# Patient Record
Sex: FEMALE | Race: WHITE | NOT HISPANIC OR LATINO | URBAN - METROPOLITAN AREA
[De-identification: names, ages, dates, MRNs, and addresses within clinical notes are randomized per-mention and may not be internally consistent; named-entity substitution may affect disease eponyms.]

---

## 2017-01-03 ENCOUNTER — INPATIENT (INPATIENT)
Facility: HOSPITAL | Age: 67
LOS: 6 days | Discharge: NURSING, REHAB OR LONG-TERM CARE | DRG: 60 | End: 2017-01-10
Attending: HOSPITALIST | Admitting: HOSPITALIST
Payer: COMMERCIAL

## 2017-01-03 VITALS
SYSTOLIC BLOOD PRESSURE: 171 MMHG | WEIGHT: 130.07 LBS | HEART RATE: 112 BPM | TEMPERATURE: 98 F | OXYGEN SATURATION: 96 % | DIASTOLIC BLOOD PRESSURE: 102 MMHG | RESPIRATION RATE: 16 BRPM

## 2017-01-03 DIAGNOSIS — Z29.9 ENCOUNTER FOR PROPHYLACTIC MEASURES, UNSPECIFIED: ICD-10-CM

## 2017-01-03 DIAGNOSIS — E78.5 HYPERLIPIDEMIA, UNSPECIFIED: ICD-10-CM

## 2017-01-03 DIAGNOSIS — Z90.710 ACQUIRED ABSENCE OF BOTH CERVIX AND UTERUS: Chronic | ICD-10-CM

## 2017-01-03 DIAGNOSIS — I63.9 CEREBRAL INFARCTION, UNSPECIFIED: ICD-10-CM

## 2017-01-03 DIAGNOSIS — I10 ESSENTIAL (PRIMARY) HYPERTENSION: ICD-10-CM

## 2017-01-03 LAB
ALBUMIN SERPL ELPH-MCNC: 4.1 G/DL — SIGNIFICANT CHANGE UP (ref 3.3–5)
ALP SERPL-CCNC: 82 U/L — SIGNIFICANT CHANGE UP (ref 40–120)
ALT FLD-CCNC: 14 U/L — SIGNIFICANT CHANGE UP (ref 12–78)
ANION GAP SERPL CALC-SCNC: 12 MMOL/L — SIGNIFICANT CHANGE UP (ref 5–17)
APTT BLD: 31.5 SEC — SIGNIFICANT CHANGE UP (ref 27.5–37.4)
AST SERPL-CCNC: 29 U/L — SIGNIFICANT CHANGE UP (ref 15–37)
BILIRUB SERPL-MCNC: 0.5 MG/DL — SIGNIFICANT CHANGE UP (ref 0.2–1.2)
BUN SERPL-MCNC: 15 MG/DL — SIGNIFICANT CHANGE UP (ref 7–23)
CALCIUM SERPL-MCNC: 8.8 MG/DL — SIGNIFICANT CHANGE UP (ref 8.5–10.1)
CHLORIDE SERPL-SCNC: 108 MMOL/L — SIGNIFICANT CHANGE UP (ref 96–108)
CK MB BLD-MCNC: 0.7 % — SIGNIFICANT CHANGE UP (ref 0–3.5)
CK MB CFR SERPL CALC: 0.7 NG/ML — SIGNIFICANT CHANGE UP (ref 0–3.6)
CK SERPL-CCNC: 107 U/L — SIGNIFICANT CHANGE UP (ref 26–192)
CO2 SERPL-SCNC: 20 MMOL/L — LOW (ref 22–31)
CREAT SERPL-MCNC: 1.2 MG/DL — SIGNIFICANT CHANGE UP (ref 0.5–1.3)
GLUCOSE SERPL-MCNC: 111 MG/DL — HIGH (ref 70–99)
HCT VFR BLD CALC: 41.7 % — SIGNIFICANT CHANGE UP (ref 34.5–45)
HGB BLD-MCNC: 14.1 G/DL — SIGNIFICANT CHANGE UP (ref 11.5–15.5)
INR BLD: 1.03 RATIO — SIGNIFICANT CHANGE UP (ref 0.88–1.16)
MCHC RBC-ENTMCNC: 32.4 PG — SIGNIFICANT CHANGE UP (ref 27–34)
MCHC RBC-ENTMCNC: 33.8 GM/DL — SIGNIFICANT CHANGE UP (ref 32–36)
MCV RBC AUTO: 95.7 FL — SIGNIFICANT CHANGE UP (ref 80–100)
PLATELET # BLD AUTO: 357 K/UL — SIGNIFICANT CHANGE UP (ref 150–400)
POTASSIUM SERPL-MCNC: 5.4 MMOL/L — HIGH (ref 3.5–5.3)
POTASSIUM SERPL-SCNC: 5.4 MMOL/L — HIGH (ref 3.5–5.3)
PROT SERPL-MCNC: 8.6 G/DL — HIGH (ref 6–8.3)
PROTHROM AB SERPL-ACNC: 11.4 SEC — SIGNIFICANT CHANGE UP (ref 10–13.1)
RBC # BLD: 4.35 M/UL — SIGNIFICANT CHANGE UP (ref 3.8–5.2)
RBC # FLD: 11.2 % — SIGNIFICANT CHANGE UP (ref 10.3–14.5)
SODIUM SERPL-SCNC: 140 MMOL/L — SIGNIFICANT CHANGE UP (ref 135–145)
TROPONIN I SERPL-MCNC: <.015 NG/ML — SIGNIFICANT CHANGE UP (ref 0.01–0.04)
WBC # BLD: 8.5 K/UL — SIGNIFICANT CHANGE UP (ref 3.8–10.5)
WBC # FLD AUTO: 8.5 K/UL — SIGNIFICANT CHANGE UP (ref 3.8–10.5)

## 2017-01-03 PROCEDURE — 99285 EMERGENCY DEPT VISIT HI MDM: CPT

## 2017-01-03 PROCEDURE — 70450 CT HEAD/BRAIN W/O DYE: CPT | Mod: 26

## 2017-01-03 PROCEDURE — 93010 ELECTROCARDIOGRAM REPORT: CPT

## 2017-01-03 PROCEDURE — 99223 1ST HOSP IP/OBS HIGH 75: CPT | Mod: GC

## 2017-01-03 PROCEDURE — 71010: CPT | Mod: 26

## 2017-01-03 RX ORDER — ASPIRIN/CALCIUM CARB/MAGNESIUM 324 MG
81 TABLET ORAL DAILY
Qty: 0 | Refills: 0 | Status: DISCONTINUED | OUTPATIENT
Start: 2017-01-03 | End: 2017-01-10

## 2017-01-03 RX ORDER — RIVAROXABAN 15 MG-20MG
20 KIT ORAL DAILY
Qty: 0 | Refills: 0 | Status: DISCONTINUED | OUTPATIENT
Start: 2017-01-03 | End: 2017-01-03

## 2017-01-03 RX ORDER — ATORVASTATIN CALCIUM 80 MG/1
40 TABLET, FILM COATED ORAL AT BEDTIME
Qty: 0 | Refills: 0 | Status: DISCONTINUED | OUTPATIENT
Start: 2017-01-03 | End: 2017-01-10

## 2017-01-03 RX ORDER — PANTOPRAZOLE SODIUM 20 MG/1
40 TABLET, DELAYED RELEASE ORAL
Qty: 0 | Refills: 0 | Status: DISCONTINUED | OUTPATIENT
Start: 2017-01-03 | End: 2017-01-10

## 2017-01-03 RX ORDER — RIVAROXABAN 15 MG-20MG
15 KIT ORAL DAILY
Qty: 0 | Refills: 0 | Status: DISCONTINUED | OUTPATIENT
Start: 2017-01-03 | End: 2017-01-04

## 2017-01-03 RX ADMIN — RIVAROXABAN 15 MILLIGRAM(S): KIT at 23:48

## 2017-01-03 NOTE — H&P ADULT. - ASSESSMENT
67 yo f pmhx htn, hld, strokes (Jan 2014, March 2014), ?a. fib, on xarelto (admits to not taking it for approx 1 month), presents with slurred speech and balance problems, much improved as the evening progressed. Admitted to tele for r/o TIA.

## 2017-01-03 NOTE — ED PROVIDER NOTE - MEDICAL DECISION MAKING DETAILS
pt with sx of speech change, balance and coordination off for greater than 5 hours, on xarelto.  ct head no acute, admit tele. neuro will see pt in am

## 2017-01-03 NOTE — ED PROVIDER NOTE - CHPI ED SYMPTOMS NEG
no focal deficit/no blurred vision/no aura/no fever/no change in level of consciousness/no facial droop/no agitation

## 2017-01-03 NOTE — ED PROVIDER NOTE - CONSTITUTIONAL, MLM
normal... elderrly, well nourished, awake, alert, oriented to person, place, time/situation and in no apparent distress.

## 2017-01-03 NOTE — H&P ADULT. - PROBLEM SELECTOR PLAN 2
- questionable hx  - follow up lipid panel   - lipitor 40 started secondary to stroke hx - questionable hx  - follow up lipid panel   - lipitor 40 started secondary to stroke hx will adjust to goal LDL

## 2017-01-03 NOTE — ED ADULT NURSE NOTE - OBJECTIVE STATEMENT
Pt. received alert and oriented x4 with chief complaint of dizziness/lightheadedness/and leaning to right side since early this afternoon. Pt. placed on continuous cardiac monitor with continuous pulse ox. EKG performed at bedside upon arrival.

## 2017-01-03 NOTE — H&P ADULT. - HISTORY OF PRESENT ILLNESS
65 yo f pmhx htn, hld, strokes (Jan 2014, March 2014), ?a. fib, on xarelto (admits to not taking it for approx 1 month), presents with slurred speech and balance problems. Pt. son bedside states when he got home from work his mother was hunched over, had difficulty walking, rt. arm had uncontrollable tremors, and had difficulty eating her dinner. Pt. states she was complaining of dizziness, went to pour a drink and completely missed her cup. Currently pt. states she feels better, denies cp, sob, dizziness, headaches, and blurred vision, and all else on ROS. Pt. stated she did not take her xarelto because it upset her stomach. Pt. states she had been on cholesterol meds in the past but the doctor took her off of them. Pt. remarks no strokes since  2014, however son states she may have suffered from some mini strokes since. Son states in 2014 when pt. was hospitalized for strokes, pt. had complete inability of rt. UE.     In the ED, labs stable. HR max 112, currently 78. BP max 171/102, currently 109/62. EKG NSR, left atrial enlargement on prelim read, 89 bpm. CT head showed Fairly advanced atrophy and chronic microvascular changes. No acute or focal brain pathology. Stable exam. Per ED neuro (Leeanne) consulted, will see pt. in AM. Son and pt. state much improvement as the evening has progressed.

## 2017-01-03 NOTE — H&P ADULT. - PROBLEM SELECTOR PLAN 1
Pt. presented with c/o slurred speech and balance problems this evening, resolved in ED.   - CT head was stable per read  - neuro (Leeanne) consulted, will see pt. in the AM  - Leeanne called overnight to discuss anticoagulation, recommended 20 mg xarelto  - admit to TELE to monitor vitals and neuro status   - f/u labs, echo, carotid  - poor historian, f/u Cardio consult (Kareem)  - baby aspirin cont.

## 2017-01-03 NOTE — H&P ADULT. - NEUROLOGICAL DETAILS
responds to verbal commands/sensation intact/alert and oriented x 3/responds to pain/cranial nerves intact

## 2017-01-03 NOTE — ED PROVIDER NOTE - OBJECTIVE STATEMENT
Pt is a 67 yo female hx cva, htn. pt last well this am.  family member states that she wasn't speaking right and was very clumsy this am. pt this afternoon noted by other family to have same sx. pt on xarelto. pt denies co.  sx improving and speech fluent now

## 2017-01-04 ENCOUNTER — TRANSCRIPTION ENCOUNTER (OUTPATIENT)
Age: 67
End: 2017-01-04

## 2017-01-04 LAB
ALBUMIN SERPL ELPH-MCNC: 4.1 G/DL — SIGNIFICANT CHANGE UP (ref 3.3–5)
ALP SERPL-CCNC: 76 U/L — SIGNIFICANT CHANGE UP (ref 40–120)
ALT FLD-CCNC: 12 U/L — SIGNIFICANT CHANGE UP (ref 12–78)
ANION GAP SERPL CALC-SCNC: 9 MMOL/L — SIGNIFICANT CHANGE UP (ref 5–17)
AST SERPL-CCNC: 10 U/L — LOW (ref 15–37)
BASOPHILS # BLD AUTO: 0.1 K/UL — SIGNIFICANT CHANGE UP (ref 0–0.2)
BASOPHILS NFR BLD AUTO: 0.9 % — SIGNIFICANT CHANGE UP (ref 0–2)
BILIRUB SERPL-MCNC: 0.5 MG/DL — SIGNIFICANT CHANGE UP (ref 0.2–1.2)
BUN SERPL-MCNC: 16 MG/DL — SIGNIFICANT CHANGE UP (ref 7–23)
CALCIUM SERPL-MCNC: 9 MG/DL — SIGNIFICANT CHANGE UP (ref 8.5–10.1)
CHLORIDE SERPL-SCNC: 107 MMOL/L — SIGNIFICANT CHANGE UP (ref 96–108)
CHOLEST SERPL-MCNC: 259 MG/DL — HIGH (ref 10–199)
CO2 SERPL-SCNC: 27 MMOL/L — SIGNIFICANT CHANGE UP (ref 22–31)
CREAT SERPL-MCNC: 0.85 MG/DL — SIGNIFICANT CHANGE UP (ref 0.5–1.3)
EOSINOPHIL # BLD AUTO: 0.1 K/UL — SIGNIFICANT CHANGE UP (ref 0–0.5)
EOSINOPHIL NFR BLD AUTO: 0.9 % — SIGNIFICANT CHANGE UP (ref 0–6)
GLUCOSE SERPL-MCNC: 94 MG/DL — SIGNIFICANT CHANGE UP (ref 70–99)
HCT VFR BLD CALC: 40.8 % — SIGNIFICANT CHANGE UP (ref 34.5–45)
HDLC SERPL-MCNC: 47 MG/DL — SIGNIFICANT CHANGE UP (ref 40–125)
HGB BLD-MCNC: 13.6 G/DL — SIGNIFICANT CHANGE UP (ref 11.5–15.5)
LIPID PNL WITH DIRECT LDL SERPL: 170 MG/DL — HIGH
LYMPHOCYTES # BLD AUTO: 2.9 K/UL — SIGNIFICANT CHANGE UP (ref 1–3.3)
LYMPHOCYTES # BLD AUTO: 31.4 % — SIGNIFICANT CHANGE UP (ref 13–44)
MCHC RBC-ENTMCNC: 32.2 PG — SIGNIFICANT CHANGE UP (ref 27–34)
MCHC RBC-ENTMCNC: 33.2 GM/DL — SIGNIFICANT CHANGE UP (ref 32–36)
MCV RBC AUTO: 96.8 FL — SIGNIFICANT CHANGE UP (ref 80–100)
MONOCYTES # BLD AUTO: 0.6 K/UL — SIGNIFICANT CHANGE UP (ref 0–0.9)
MONOCYTES NFR BLD AUTO: 6.6 % — SIGNIFICANT CHANGE UP (ref 1–9)
NEUTROPHILS # BLD AUTO: 5.5 K/UL — SIGNIFICANT CHANGE UP (ref 1.8–7.4)
NEUTROPHILS NFR BLD AUTO: 60.2 % — SIGNIFICANT CHANGE UP (ref 43–77)
PLATELET # BLD AUTO: 298 K/UL — SIGNIFICANT CHANGE UP (ref 150–400)
POTASSIUM SERPL-MCNC: 4.2 MMOL/L — SIGNIFICANT CHANGE UP (ref 3.5–5.3)
POTASSIUM SERPL-SCNC: 4.2 MMOL/L — SIGNIFICANT CHANGE UP (ref 3.5–5.3)
PROT SERPL-MCNC: 7.8 G/DL — SIGNIFICANT CHANGE UP (ref 6–8.3)
RBC # BLD: 4.22 M/UL — SIGNIFICANT CHANGE UP (ref 3.8–5.2)
RBC # FLD: 11.3 % — SIGNIFICANT CHANGE UP (ref 10.3–14.5)
SODIUM SERPL-SCNC: 143 MMOL/L — SIGNIFICANT CHANGE UP (ref 135–145)
TOTAL CHOLESTEROL/HDL RATIO MEASUREMENT: 5.5 RATIO — SIGNIFICANT CHANGE UP (ref 3.3–7.1)
TRIGL SERPL-MCNC: 209 MG/DL — HIGH (ref 10–149)
WBC # BLD: 9.1 K/UL — SIGNIFICANT CHANGE UP (ref 3.8–10.5)
WBC # FLD AUTO: 9.1 K/UL — SIGNIFICANT CHANGE UP (ref 3.8–10.5)

## 2017-01-04 PROCEDURE — 70551 MRI BRAIN STEM W/O DYE: CPT | Mod: 26,59

## 2017-01-04 PROCEDURE — 70544 MR ANGIOGRAPHY HEAD W/O DYE: CPT | Mod: 26

## 2017-01-04 PROCEDURE — 93880 EXTRACRANIAL BILAT STUDY: CPT | Mod: 26

## 2017-01-04 PROCEDURE — 99233 SBSQ HOSP IP/OBS HIGH 50: CPT

## 2017-01-04 PROCEDURE — 93306 TTE W/DOPPLER COMPLETE: CPT | Mod: 26

## 2017-01-04 PROCEDURE — 72156 MRI NECK SPINE W/O & W/DYE: CPT | Mod: 26

## 2017-01-04 RX ORDER — RIVAROXABAN 15 MG-20MG
20 KIT ORAL
Qty: 0 | Refills: 0 | Status: DISCONTINUED | OUTPATIENT
Start: 2017-01-04 | End: 2017-01-09

## 2017-01-04 RX ADMIN — Medication 81 MILLIGRAM(S): at 14:02

## 2017-01-04 RX ADMIN — PANTOPRAZOLE SODIUM 40 MILLIGRAM(S): 20 TABLET, DELAYED RELEASE ORAL at 05:21

## 2017-01-04 RX ADMIN — ATORVASTATIN CALCIUM 40 MILLIGRAM(S): 80 TABLET, FILM COATED ORAL at 22:04

## 2017-01-04 RX ADMIN — RIVAROXABAN 20 MILLIGRAM(S): KIT at 17:43

## 2017-01-04 NOTE — DISCHARGE NOTE ADULT - CARE PROVIDER_API CALL
Humphrey Fallon (PIPPA), Neurology  [address] [place] [address]  Phone: [phone]  Fax: [phone]    Irvin Matthews (), Family Medicine  [address] [facility] [address]nd floor  [place], [place] [id number]  Phone: [phone]  Fax: [phone]

## 2017-01-04 NOTE — DISCHARGE NOTE ADULT - MEDICATION SUMMARY - MEDICATIONS TO TAKE
I will START or STAY ON the medications listed below when I get home from the hospital:    Aspir 81 oral delayed release tablet  -- 1 tab(s) by mouth once a day  -- Indication: For Stroke    atorvastatin 40 mg oral tablet  -- 1 tab(s) by mouth once a day (at bedtime)  -- Indication: For Stroke

## 2017-01-04 NOTE — DISCHARGE NOTE ADULT - CARE PLAN
Principal Discharge DX:	TIA (transient ischemic attack)  Goal:	resolved  Instructions for follow-up, activity and diet:	Continue ASA and statin.  MRI and CT head negative for acute CVA.  Follow up with PCP, Dr. Foss, within 3 days.  Follow up with Neurology, Dr. Fallon, within 3 days.  Secondary Diagnosis:	HLD (hyperlipidemia)  Instructions for follow-up, activity and diet:	Chronic. Continue statin.  Secondary Diagnosis:	HTN (hypertension)  Instructions for follow-up, activity and diet:	Chronic. Controlled without medications.  Secondary Diagnosis:	History of stroke  Instructions for follow-up, activity and diet:	Continue with statin.  Secondary Diagnosis:	MS (multiple sclerosis)  Goal:	new diagnosis  Instructions for follow-up, activity and diet:	Follow up with Neurology within 3 days.  Secondary Diagnosis:	Atrial fibrillation  Instructions for follow-up, activity and diet:	Chronic. Has history of Atrial fibrillation but on tele monitor pt has been sinus rhythm.   Continue Xarelto. Principal Discharge DX:	TIA (transient ischemic attack)  Goal:	ruled out.  Instructions for follow-up, activity and diet:	Continue ASA and statin.  MRI and CT head negative for acute CVA.  Follow up with PCP, Dr. Foss, within 3 days.  Follow up with Neurology, Dr. Fallon, within 3 days.  Secondary Diagnosis:	HLD (hyperlipidemia)  Instructions for follow-up, activity and diet:	Chronic. Continue statin.  Secondary Diagnosis:	HTN (hypertension)  Instructions for follow-up, activity and diet:	Chronic. Controlled without medications.  Secondary Diagnosis:	History of stroke  Instructions for follow-up, activity and diet:	Continue with statin.  Secondary Diagnosis:	MS (multiple sclerosis)  Goal:	new diagnosis  Instructions for follow-up, activity and diet:	Pt s/p solumedrol x3 days.  Follow up with Neurology within 3 days.  Secondary Diagnosis:	Atrial fibrillation  Instructions for follow-up, activity and diet:	Chronic. Has history of Atrial fibrillation but on tele monitor pt has been sinus rhythm.   As per cardiology, discontinue Xarelto. Principal Discharge DX:	TIA (transient ischemic attack)  Goal:	ruled out.  Instructions for follow-up, activity and diet:	Continue ASA and statin.  MRI and CT head negative for acute CVA.  Follow up with PCP, Dr. Foss, within 3 days.  Follow up with Neurology, Dr. Fallon, within 3 days.  Secondary Diagnosis:	HLD (hyperlipidemia)  Instructions for follow-up, activity and diet:	Chronic. Continue statin.  Secondary Diagnosis:	HTN (hypertension)  Instructions for follow-up, activity and diet:	Chronic. Controlled without medications.  Secondary Diagnosis:	History of stroke  Instructions for follow-up, activity and diet:	Continue with statin.  Secondary Diagnosis:	MS (multiple sclerosis)  Goal:	new diagnosis  Instructions for follow-up, activity and diet:	Pt s/p solumedrol x3 days.  Follow up with Neurology within 3 days and follow up with Borrelia burgdorferi titers.  Secondary Diagnosis:	Atrial fibrillation  Instructions for follow-up, activity and diet:	Chronic. Has history of Atrial fibrillation but on tele monitor pt has been sinus rhythm.   As per cardiology, discontinue Xarelto.

## 2017-01-04 NOTE — DISCHARGE NOTE ADULT - PATIENT PORTAL LINK FT
“You can access the [employer] Patient Portal, offered by [facility], by registering with the following website: [email or web address]”

## 2017-01-04 NOTE — DISCHARGE NOTE ADULT - NS AS DC STROKE ED MATERIALS
Need for Followup After Discharge/High Blood pressure is a risk factor for Strokes/Risk Factors for Stroke/Stroke Warning Signs and Symptoms/Call 911 for Stroke/Prescribed Medications/Stroke Education Booklet Stroke Warning Signs and Symptoms/Risk Factors for Stroke/Need for Followup After Discharge/Call 911 for Stroke/Stroke Education Booklet/Prescribed Medications Stroke Education Booklet/Need for Followup After Discharge/Call 911 for Stroke/Risk Factors for Stroke/Prescribed Medications/Stroke Warning Signs and Symptoms

## 2017-01-04 NOTE — DISCHARGE NOTE ADULT - PLAN OF CARE
Continue ASA and statin.  MRI and CT head negative for acute CVA.  Follow up with PCP, Dr. Foss, within 3 days.  Follow up with Neurology, Dr. Fallon, within 3 days. Chronic. Continue statin. Chronic. Controlled without medications. resolved Continue with statin. Follow up with Neurology within 3 days. new diagnosis Chronic. Has history of Atrial fibrillation but on tele monitor pt has been sinus rhythm.   Continue Xarelto. ruled out. Pt s/p solumedrol x3 days.  Follow up with Neurology within 3 days. Chronic. Has history of Atrial fibrillation but on tele monitor pt has been sinus rhythm.   As per cardiology, discontinue Xarelto. Pt s/p solumedrol x3 days.  Follow up with Neurology within 3 days and follow up with Borrelia burgdorferi titers.

## 2017-01-04 NOTE — DISCHARGE NOTE ADULT - HOSPITAL COURSE
67 yo f pmhx htn, hld, strokes (Jan 2014, March 2014),?a. fib, on xarelto (admits to not taking it for approx 1 month), presents with slurred speech and balance problems. Pt. son bedside states when he got home from work his mother was hunched over, had difficulty walking, rt. arm had uncontrollable tremors, and had difficulty eating her dinner. Pt. states she was complaining of dizziness, went to pour a drink and completely missed her cup. Currently pt. states she feels better, denies cp, sob, dizziness, headaches, and blurred vision, and all else on ROS. Pt. stated she did not take her xarelto because it upset her stomach. Pt. states she had been on cholesterol meds in the past but the doctor took her off of them. Pt. remarks no strokes since  2014, however son states she may have suffered from some mini strokes since. Son states in 2014 when pt. was hospitalized for strokes, pt. had complete inability of rt. UE.     In the ED, labs stable. HR max 112, currently 78. BP max 171/102, currently 109/62. EKG NSR, left atrial enlargement on prelim read, 89 bpm. CT head showed Fairly advanced atrophy and chronic microvascular changes. No acute or focal brain pathology. Stable exam. Per ED neuro (Leeanne) consulted, will see pt. in AM. Son and pt. state much improvement as the evening has progressed    CXR- no active disease, stable exam.  MRI head/brain/cspine/tspine done and reviewed on sunrise.  US carotid B/L- no flow limiting stenosis.  Pt started in ASA and statin.  Lipid panel revealed Chol 259, , HDL 47, .  Pt was in sinus rhythm overnight and no A.fib observed on tele.  Pt continued on xarelto. As per neurology, Dr. Fallon, pt most likely has multiple sclerosis and will follow up with neurology as outpatient.    Pt seen and examined at bedside.  PE: Vitals  Gen:  Cardio:  Resp:  Abd:   Ext:    Pt medically optimized for 65 yo f pmhx htn, hld, strokes (Jan 2014, March 2014),?a. fib, on xarelto (admits to not taking it for approx 1 month), presents with slurred speech and balance problems. Pt. son bedside states when he got home from work his mother was hunched over, had difficulty walking, rt. arm had uncontrollable tremors, and had difficulty eating her dinner. Pt. states she was complaining of dizziness, went to pour a drink and completely missed her cup. Currently pt. states she feels better, denies cp, sob, dizziness, headaches, and blurred vision, and all else on ROS. Pt. stated she did not take her xarelto because it upset her stomach. Pt. states she had been on cholesterol meds in the past but the doctor took her off of them. Pt. remarks no strokes since  2014, however son states she may have suffered from some mini strokes since. Son states in 2014 when pt. was hospitalized for strokes, pt. had complete inability of rt. UE.     In the ED, labs stable. HR max 112, currently 78. BP max 171/102, currently 109/62. EKG NSR, left atrial enlargement on prelim read, 89 bpm. CT head showed Fairly advanced atrophy and chronic microvascular changes. No acute or focal brain pathology. Stable exam. Per ED neuro (Leeanne) consulted, will see pt. in AM. Son and pt. state much improvement as the evening has progressed    CXR- no active disease, stable exam.  MRI head/brain/cspine/tspine done and reviewed on sunrise.  US carotid B/L- no flow limiting stenosis.  Pt started in ASA and statin.  Lipid panel revealed Chol 259, , HDL 47, .  Pt was in sinus rhythm overnight and no A.fib observed on tele.  Pt continued on xarelto while at [facility].  As per neurology, Dr. Fallon, pt most likely has multiple sclerosis.  Pt given solumedrol x 3 days.  Will follow up with neurology as outpatient.    Pt seen and examined at bedside.  Pt states residual R side weakness from previous stoke.  Denies any complaints today. Denies any CP, SOB, abdominal pain, HA.  Monitored on tele with no acute events over night and has been in sinus rhythm.  PE: Vitals: 97.9F, HR 78, 148/83, RR17, 97% O2 on RA  Gen: NAD, AAOx3  Cardio: +S1/S2  Resp: CTA B/L  Abd: +BS, NDNT, soft  Ext: no edema, 2+ DP pulses  MSK: 5/5 LUE and LLE, 4+/5 RUE and RLE, very mild tremors    Pt is medically stable to be discharge to acute rehab.    Pt medically optimized for 65 yo f pmhx htn, hld, strokes (Jan 2014, March 2014),?a. fib, on xarelto (admits to not taking it for approx 1 month), presents with slurred speech and balance problems. Pt. son bedside states when he got home from work his mother was hunched over, had difficulty walking, rt. arm had uncontrollable tremors, and had difficulty eating her dinner. Pt. states she was complaining of dizziness, went to pour a drink and completely missed her cup. Currently pt. states she feels better, denies cp, sob, dizziness, headaches, and blurred vision, and all else on ROS. Pt. stated she did not take her xarelto because it upset her stomach. Pt. states she had been on cholesterol meds in the past but the doctor took her off of them. Pt. remarks no strokes since  2014, however son states she may have suffered from some mini strokes since. Son states in 2014 when pt. was hospitalized for strokes, pt. had complete inability of rt. UE.     In the ED, labs stable. HR max 112, currently 78. BP max 171/102, currently 109/62. EKG NSR, left atrial enlargement on prelim read, 89 bpm. CT head showed Fairly advanced atrophy and chronic microvascular changes. No acute or focal brain pathology. Stable exam. Per ED neuro (Leeanne) consulted, will see pt. in AM. Son and pt. state much improvement as the evening has progressed    CXR- no active disease, stable exam.  MRI head/brain/cspine/tspine done and reviewed on sunrise.  US carotid B/L- no flow limiting stenosis.  Pt started in ASA and statin.  Lipid panel revealed Chol 259, , HDL 47, .  Pt was in sinus rhythm overnight and no A.fib observed on tele.  Pt continued on xarelto while at [place].  As per neurology, Dr. Fallon, pt most likely has multiple sclerosis.  Pt given solumedrol x 3 days.  Will follow up with neurology as outpatient.    Pt seen and examined at bedside.  Denies any complaints today. Denies any CP, SOB, abdominal pain.   PE: Vitals: 98F, HR 59, 145/81, RR 16, 97% O2 on RA  Gen: NAD, AAOx3  Cardio: +S1/S2  Resp: CTA B/L  Abd: +BS, NDNT, soft  Ext: no edema, 2+ DP pulses  MSK: 5/5 LUE and LLE, 4+/5 RUE and RLE, mild tremors    Pt is medically stable to be discharge to acute rehab.

## 2017-01-04 NOTE — DISCHARGE NOTE ADULT - SECONDARY DIAGNOSIS.
HLD (hyperlipidemia) HTN (hypertension) History of stroke MS (multiple sclerosis) Atrial fibrillation

## 2017-01-04 NOTE — DISCHARGE NOTE ADULT - ADDITIONAL INSTRUCTIONS
Follow up with PCP, Dr. Foss, within 3 days.  Follow up with Neurology, Dr. Fallon, within 3 days. Follow up with PCP, Dr. Foss, within 3 days.  Follow up with Neurology, Dr. Fallon, within 3 days.  As per cardiology, discontinue Xarelto.

## 2017-01-05 LAB
ANION GAP SERPL CALC-SCNC: 9 MMOL/L — SIGNIFICANT CHANGE UP (ref 5–17)
BUN SERPL-MCNC: 17 MG/DL — SIGNIFICANT CHANGE UP (ref 7–23)
CALCIUM SERPL-MCNC: 9.6 MG/DL — SIGNIFICANT CHANGE UP (ref 8.5–10.1)
CHLORIDE SERPL-SCNC: 106 MMOL/L — SIGNIFICANT CHANGE UP (ref 96–108)
CO2 SERPL-SCNC: 27 MMOL/L — SIGNIFICANT CHANGE UP (ref 22–31)
CREAT SERPL-MCNC: 0.99 MG/DL — SIGNIFICANT CHANGE UP (ref 0.5–1.3)
GLUCOSE SERPL-MCNC: 93 MG/DL — SIGNIFICANT CHANGE UP (ref 70–99)
HCT VFR BLD CALC: 43 % — SIGNIFICANT CHANGE UP (ref 34.5–45)
HGB BLD-MCNC: 14.7 G/DL — SIGNIFICANT CHANGE UP (ref 11.5–15.5)
MCHC RBC-ENTMCNC: 32.2 PG — SIGNIFICANT CHANGE UP (ref 27–34)
MCHC RBC-ENTMCNC: 34.2 GM/DL — SIGNIFICANT CHANGE UP (ref 32–36)
MCV RBC AUTO: 94.2 FL — SIGNIFICANT CHANGE UP (ref 80–100)
PLATELET # BLD AUTO: 326 K/UL — SIGNIFICANT CHANGE UP (ref 150–400)
POTASSIUM SERPL-MCNC: 3.9 MMOL/L — SIGNIFICANT CHANGE UP (ref 3.5–5.3)
POTASSIUM SERPL-SCNC: 3.9 MMOL/L — SIGNIFICANT CHANGE UP (ref 3.5–5.3)
RBC # BLD: 4.57 M/UL — SIGNIFICANT CHANGE UP (ref 3.8–5.2)
RBC # FLD: 11 % — SIGNIFICANT CHANGE UP (ref 10.3–14.5)
SODIUM SERPL-SCNC: 142 MMOL/L — SIGNIFICANT CHANGE UP (ref 135–145)
WBC # BLD: 7.8 K/UL — SIGNIFICANT CHANGE UP (ref 3.8–10.5)
WBC # FLD AUTO: 7.8 K/UL — SIGNIFICANT CHANGE UP (ref 3.8–10.5)

## 2017-01-05 PROCEDURE — 72157 MRI CHEST SPINE W/O & W/DYE: CPT | Mod: 26

## 2017-01-05 PROCEDURE — 99232 SBSQ HOSP IP/OBS MODERATE 35: CPT

## 2017-01-05 RX ORDER — RIVAROXABAN 15 MG-20MG
1 KIT ORAL
Qty: 0 | Refills: 0 | COMMUNITY
Start: 2017-01-05

## 2017-01-05 RX ADMIN — PANTOPRAZOLE SODIUM 40 MILLIGRAM(S): 20 TABLET, DELAYED RELEASE ORAL at 06:33

## 2017-01-05 RX ADMIN — Medication 81 MILLIGRAM(S): at 12:47

## 2017-01-05 RX ADMIN — ATORVASTATIN CALCIUM 40 MILLIGRAM(S): 80 TABLET, FILM COATED ORAL at 21:19

## 2017-01-05 RX ADMIN — Medication 100 MILLIGRAM(S): at 21:19

## 2017-01-05 RX ADMIN — RIVAROXABAN 20 MILLIGRAM(S): KIT at 17:25

## 2017-01-06 LAB
ANION GAP SERPL CALC-SCNC: 13 MMOL/L — SIGNIFICANT CHANGE UP (ref 5–17)
BUN SERPL-MCNC: 23 MG/DL — SIGNIFICANT CHANGE UP (ref 7–23)
CALCIUM SERPL-MCNC: 9.4 MG/DL — SIGNIFICANT CHANGE UP (ref 8.5–10.1)
CHLORIDE SERPL-SCNC: 107 MMOL/L — SIGNIFICANT CHANGE UP (ref 96–108)
CO2 SERPL-SCNC: 22 MMOL/L — SIGNIFICANT CHANGE UP (ref 22–31)
CREAT SERPL-MCNC: 1 MG/DL — SIGNIFICANT CHANGE UP (ref 0.5–1.3)
GLUCOSE SERPL-MCNC: 158 MG/DL — HIGH (ref 70–99)
HCT VFR BLD CALC: 41.2 % — SIGNIFICANT CHANGE UP (ref 34.5–45)
HGB BLD-MCNC: 14.4 G/DL — SIGNIFICANT CHANGE UP (ref 11.5–15.5)
MCHC RBC-ENTMCNC: 33.1 PG — SIGNIFICANT CHANGE UP (ref 27–34)
MCHC RBC-ENTMCNC: 34.8 GM/DL — SIGNIFICANT CHANGE UP (ref 32–36)
MCV RBC AUTO: 95 FL — SIGNIFICANT CHANGE UP (ref 80–100)
PLATELET # BLD AUTO: 324 K/UL — SIGNIFICANT CHANGE UP (ref 150–400)
POTASSIUM SERPL-MCNC: 3.9 MMOL/L — SIGNIFICANT CHANGE UP (ref 3.5–5.3)
POTASSIUM SERPL-SCNC: 3.9 MMOL/L — SIGNIFICANT CHANGE UP (ref 3.5–5.3)
RBC # BLD: 4.34 M/UL — SIGNIFICANT CHANGE UP (ref 3.8–5.2)
RBC # FLD: 10.8 % — SIGNIFICANT CHANGE UP (ref 10.3–14.5)
SODIUM SERPL-SCNC: 142 MMOL/L — SIGNIFICANT CHANGE UP (ref 135–145)
WBC # BLD: 10.4 K/UL — SIGNIFICANT CHANGE UP (ref 3.8–10.5)
WBC # FLD AUTO: 10.4 K/UL — SIGNIFICANT CHANGE UP (ref 3.8–10.5)

## 2017-01-06 PROCEDURE — 99233 SBSQ HOSP IP/OBS HIGH 50: CPT

## 2017-01-06 RX ADMIN — RIVAROXABAN 20 MILLIGRAM(S): KIT at 17:53

## 2017-01-06 RX ADMIN — ATORVASTATIN CALCIUM 40 MILLIGRAM(S): 80 TABLET, FILM COATED ORAL at 21:23

## 2017-01-06 RX ADMIN — Medication 81 MILLIGRAM(S): at 12:21

## 2017-01-06 RX ADMIN — Medication 100 MILLIGRAM(S): at 09:31

## 2017-01-06 RX ADMIN — PANTOPRAZOLE SODIUM 40 MILLIGRAM(S): 20 TABLET, DELAYED RELEASE ORAL at 05:46

## 2017-01-06 RX ADMIN — Medication 100 MILLIGRAM(S): at 21:23

## 2017-01-06 NOTE — PHYSICAL THERAPY INITIAL EVALUATION ADULT - TRANSFER SAFETY CONCERNS NOTED: SIT/STAND, REHAB EVAL
losing balance/decreased balance during turns/decreased proprioception/decreased weight-shifting ability/decreased safety awareness/decreased sequencing ability

## 2017-01-06 NOTE — PHYSICAL THERAPY INITIAL EVALUATION ADULT - BRACE/ORTHOTICS
pt ambulated 6ft with straight cane and contact guard and fair-/fair balance with decreased chinmay compared with rolling walker

## 2017-01-06 NOTE — PHYSICAL THERAPY INITIAL EVALUATION ADULT - LEVEL OF INDEPENDENCE: SUPINE/SIT, REHAB EVAL
independent c/o dizziness /c sup to sit and when she moves her neck. Cervical spine flex/ext > rotation/supervision

## 2017-01-06 NOTE — PHYSICAL THERAPY INITIAL EVALUATION ADULT - BALANCE DISTURBANCE, IDENTIFIED IMPAIRMENT CONTRIBUTE, REHAB EVAL
impaired motor control/abnormal muscle tone/impaired coordination/decreased ROM/impaired postural control/decreased sensation/decreased strength

## 2017-01-06 NOTE — PHYSICAL THERAPY INITIAL EVALUATION ADULT - GAIT PATTERN USED, PT EVAL
3-point gait/postural sway, almost LOB, unsteady postural sway, almost LOB, unsteady. Shuffles and retro leans especially when turning/3-point gait

## 2017-01-06 NOTE — PHYSICAL THERAPY INITIAL EVALUATION ADULT - GENERAL OBSERVATIONS, REHAB EVAL
received supine with head of bed elevated Pt received in bed /c Dr. Sifuentes examining pt. pt /c NAD or c/o. Pt is agreeable /c PT eval. Pt speech noted to be dysarthric at times.

## 2017-01-06 NOTE — PHYSICAL THERAPY INITIAL EVALUATION ADULT - DID THE PATIENT HAVE SURGERY?
Carotid doppler US: (-) stenosis, MRI brain: Corresponding to left dorsal pontine signal abnormality, could be acute demyelinating. Possible MS vs CVA. MRI and C/spine: (-) cord compression, T8 focal aberrant intramedullary/n/a

## 2017-01-06 NOTE — PHYSICAL THERAPY INITIAL EVALUATION ADULT - DIAGNOSIS, PT EVAL
decreased functional mobility due to weakness Altered gait, balance, uncoordinated. Right sided weakness

## 2017-01-06 NOTE — PHYSICAL THERAPY INITIAL EVALUATION ADULT - ANKLE STRATEGY ASSESSMENT, REHAB EVAL
Finger to nose impaired to right UE and heel to shin on RLE. Tremors noted for BUE Finger to nose impaired to right UE and heel to shin on RLE. Tremors noted for BUE, right > left. Muscle atrophy to hands

## 2017-01-06 NOTE — PHYSICAL THERAPY INITIAL EVALUATION ADULT - PHYSICAL ASSIST/NONPHYSICAL ASSIST: STAND/SIT, REHAB EVAL
verbal cues/1 person assist/nonverbal cues (demo/gestures) 1 person assist/set-up required/verbal cues/Tactile cues/nonverbal cues (demo/gestures)

## 2017-01-06 NOTE — PHYSICAL THERAPY INITIAL EVALUATION ADULT - IMPAIRED TRANSFERS: SIT/STAND, REHAB EVAL
decreased strength/impaired balance impaired coordination/impaired balance/decreased flexibility/abnormal muscle tone/impaired postural control/decreased ROM/decreased strength/RUE/RLE (sensation decreased to right LE below knee)/decreased sensation

## 2017-01-06 NOTE — PHYSICAL THERAPY INITIAL EVALUATION ADULT - ORIENTATION, REHAB EVAL
oriented to person, place, time and situation oriented to person, place, time and situation/some slight confusion/forgetfulness noted. Speech is dysarthric at times. Pt fumbles over her words

## 2017-01-06 NOTE — PHYSICAL THERAPY INITIAL EVALUATION ADULT - PLANNED THERAPY INTERVENTIONS, PT EVAL
stair training/balance training/strengthening/gait training/bed mobility training/transfer training transfer training/strengthening/balance training/gait training/bed mobility training

## 2017-01-06 NOTE — PHYSICAL THERAPY INITIAL EVALUATION ADULT - RANGE OF MOTION EXAMINATION, REHAB EVAL
no ROM deficits were identified/right ankle DF active up to ~50 degrees and passively to 90*/neutral. Left ankle actively 70 and passively WFL/deficits as listed below

## 2017-01-06 NOTE — PHYSICAL THERAPY INITIAL EVALUATION ADULT - IMPAIRMENTS FOUND, PT EVAL
gait, locomotion, and balance/muscle strength sensory integrity/muscle strength/Stairs, tremors,/gait, locomotion, and balance/ROM/tone/arousal, attention, and cognition/fine motor/poor safety awareness

## 2017-01-06 NOTE — PHYSICAL THERAPY INITIAL EVALUATION ADULT - MANUAL MUSCLE TESTING RESULTS, REHAB EVAL
RUE/RLE >/=3, LUE/LLE >/= 3+ Right UE 3+/5, right LE 4-/5 except right ankle 3+/5 /c in available range. LLE/LUE 5/5 and left ankle 4+/5 within available range

## 2017-01-06 NOTE — PHYSICAL THERAPY INITIAL EVALUATION ADULT - PHYSICAL ASSIST/NONPHYSICAL ASSIST: GAIT, REHAB EVAL
nonverbal cues (demo/gestures)/1 person assist/verbal cues verbal cues/set-up required/1 person assist/nonverbal cues (demo/gestures)

## 2017-01-06 NOTE — PHYSICAL THERAPY INITIAL EVALUATION ADULT - ADDITIONAL COMMENTS
lives with son in private house with 1 step to enter with 1 rail and 5 steps inside with 1 rail, right hand dominant Pt lives with son in private house with 2-3 step to enter with 1 rail and 5 steps inside with 1 rail, right hand dominant. Pt reports she is independent /c all functional mobility and ADLs prior to admission. Pt reports using a rolling walker for ambulation since CVA in 2014. Pt also has a single axis cane. Pt is home alone during daytime and does not drive.

## 2017-01-06 NOTE — PHYSICAL THERAPY INITIAL EVALUATION ADULT - IMPAIRMENTS CONTRIBUTING TO GAIT DEVIATIONS, PT EVAL
abnormal muscle tone/decreased ROM/decreased strength/tightness to bilateral ankle right > left/decreased flexibility/decreased sensation/impaired balance/impaired coordination/impaired motor control/impaired postural control

## 2017-01-06 NOTE — PHYSICAL THERAPY INITIAL EVALUATION ADULT - GAIT DEVIATIONS NOTED, PT EVAL
decreased chinmay/decreased step length decreased stride length/decreased step length/decreased chinmay/uncoordinated /c RLE/decreased weight-shifting ability/decreased velocity of limb motion

## 2017-01-06 NOTE — PHYSICAL THERAPY INITIAL EVALUATION ADULT - PERTINENT HX OF CURRENT PROBLEM, REHAB EVAL
+recent stroke now with worsening dysarthria/ataxic gait. Pt had been started on ASA as bridge to Coumadin & told to stop ASA once Coumadin therapeutic range but She never stopped ASA. Pt never followed up with Dr after recent stroke. CT head 3/3: No acute territorial infarct/acute hemorrhage/mass effect, Extensive chronic small vessel ischemic disease, No significant change compared to 1/7 As per H&P: "65 yo f pmhx htn, hld, strokes (Jan 2014, March 2014), ?a. fib, on xarelto (admits to not taking it for approx 1 month), presents with slurred speech and balance problems. Pt. son bedside states when he got home from work his mother was hunched over, had difficulty walking, rt. arm had uncontrollable tremors, and had difficulty eating her dinner. Pt. states she was complaining of dizziness, went to pour a drink and completely missed her cup." Pt symptoms improved once at ED.

## 2017-01-07 LAB
ANION GAP SERPL CALC-SCNC: 13 MMOL/L — SIGNIFICANT CHANGE UP (ref 5–17)
BUN SERPL-MCNC: 19 MG/DL — SIGNIFICANT CHANGE UP (ref 7–23)
CALCIUM SERPL-MCNC: 9.6 MG/DL — SIGNIFICANT CHANGE UP (ref 8.5–10.1)
CHLORIDE SERPL-SCNC: 105 MMOL/L — SIGNIFICANT CHANGE UP (ref 96–108)
CO2 SERPL-SCNC: 23 MMOL/L — SIGNIFICANT CHANGE UP (ref 22–31)
CREAT SERPL-MCNC: 0.97 MG/DL — SIGNIFICANT CHANGE UP (ref 0.5–1.3)
FOLATE SERPL-MCNC: 6.5 NG/ML — SIGNIFICANT CHANGE UP (ref 4.8–24.2)
GLUCOSE SERPL-MCNC: 111 MG/DL — HIGH (ref 70–99)
HCT VFR BLD CALC: 39.5 % — SIGNIFICANT CHANGE UP (ref 34.5–45)
HGB BLD-MCNC: 13.7 G/DL — SIGNIFICANT CHANGE UP (ref 11.5–15.5)
MCHC RBC-ENTMCNC: 33.1 PG — SIGNIFICANT CHANGE UP (ref 27–34)
MCHC RBC-ENTMCNC: 34.8 GM/DL — SIGNIFICANT CHANGE UP (ref 32–36)
MCV RBC AUTO: 95.1 FL — SIGNIFICANT CHANGE UP (ref 80–100)
PLATELET # BLD AUTO: 312 K/UL — SIGNIFICANT CHANGE UP (ref 150–400)
POTASSIUM SERPL-MCNC: 3.6 MMOL/L — SIGNIFICANT CHANGE UP (ref 3.5–5.3)
POTASSIUM SERPL-SCNC: 3.6 MMOL/L — SIGNIFICANT CHANGE UP (ref 3.5–5.3)
RBC # BLD: 4.15 M/UL — SIGNIFICANT CHANGE UP (ref 3.8–5.2)
RBC # FLD: 10.9 % — SIGNIFICANT CHANGE UP (ref 10.3–14.5)
SODIUM SERPL-SCNC: 141 MMOL/L — SIGNIFICANT CHANGE UP (ref 135–145)
TSH SERPL-MCNC: 0.65 UIU/ML — SIGNIFICANT CHANGE UP (ref 0.36–3.74)
VIT B12 SERPL-MCNC: 410 PG/ML — SIGNIFICANT CHANGE UP (ref 243–894)
WBC # BLD: 17.8 K/UL — HIGH (ref 3.8–10.5)
WBC # FLD AUTO: 17.8 K/UL — HIGH (ref 3.8–10.5)

## 2017-01-07 PROCEDURE — 99232 SBSQ HOSP IP/OBS MODERATE 35: CPT

## 2017-01-07 RX ADMIN — PANTOPRAZOLE SODIUM 40 MILLIGRAM(S): 20 TABLET, DELAYED RELEASE ORAL at 05:07

## 2017-01-07 RX ADMIN — RIVAROXABAN 20 MILLIGRAM(S): KIT at 17:06

## 2017-01-07 RX ADMIN — ATORVASTATIN CALCIUM 40 MILLIGRAM(S): 80 TABLET, FILM COATED ORAL at 21:44

## 2017-01-07 RX ADMIN — Medication 81 MILLIGRAM(S): at 11:19

## 2017-01-07 RX ADMIN — Medication 58 MILLIGRAM(S): at 13:15

## 2017-01-08 PROCEDURE — 99232 SBSQ HOSP IP/OBS MODERATE 35: CPT

## 2017-01-08 RX ADMIN — PANTOPRAZOLE SODIUM 40 MILLIGRAM(S): 20 TABLET, DELAYED RELEASE ORAL at 05:44

## 2017-01-08 RX ADMIN — Medication 58 MILLIGRAM(S): at 05:44

## 2017-01-08 RX ADMIN — RIVAROXABAN 20 MILLIGRAM(S): KIT at 16:59

## 2017-01-08 RX ADMIN — ATORVASTATIN CALCIUM 40 MILLIGRAM(S): 80 TABLET, FILM COATED ORAL at 21:37

## 2017-01-08 RX ADMIN — Medication 81 MILLIGRAM(S): at 11:22

## 2017-01-09 PROCEDURE — 99232 SBSQ HOSP IP/OBS MODERATE 35: CPT | Mod: GC

## 2017-01-09 RX ORDER — RIVAROXABAN 15 MG-20MG
20 KIT ORAL DAILY
Qty: 0 | Refills: 0 | Status: DISCONTINUED | OUTPATIENT
Start: 2017-01-09 | End: 2017-01-10

## 2017-01-09 RX ORDER — ASPIRIN/CALCIUM CARB/MAGNESIUM 324 MG
0 TABLET ORAL
Qty: 0 | Refills: 0 | COMMUNITY

## 2017-01-09 RX ADMIN — ATORVASTATIN CALCIUM 40 MILLIGRAM(S): 80 TABLET, FILM COATED ORAL at 21:07

## 2017-01-09 RX ADMIN — Medication 81 MILLIGRAM(S): at 11:49

## 2017-01-09 RX ADMIN — Medication 58 MILLIGRAM(S): at 05:55

## 2017-01-09 RX ADMIN — PANTOPRAZOLE SODIUM 40 MILLIGRAM(S): 20 TABLET, DELAYED RELEASE ORAL at 05:13

## 2017-01-09 RX ADMIN — RIVAROXABAN 20 MILLIGRAM(S): KIT at 17:58

## 2017-01-10 ENCOUNTER — INPATIENT (INPATIENT)
Facility: HOSPITAL | Age: 67
LOS: 12 days | Discharge: NURSING, REHAB OR LONG-TERM CARE | DRG: 949 | End: 2017-01-23
Attending: PSYCHIATRY & NEUROLOGY
Payer: COMMERCIAL

## 2017-01-10 VITALS
DIASTOLIC BLOOD PRESSURE: 72 MMHG | RESPIRATION RATE: 16 BRPM | OXYGEN SATURATION: 96 % | TEMPERATURE: 98 F | SYSTOLIC BLOOD PRESSURE: 152 MMHG | HEART RATE: 70 BPM

## 2017-01-10 DIAGNOSIS — E87.6 HYPOKALEMIA: ICD-10-CM

## 2017-01-10 DIAGNOSIS — I69.351 HEMIPLEGIA AND HEMIPARESIS FOLLOWING CEREBRAL INFARCTION AFFECTING RIGHT DOMINANT SIDE: ICD-10-CM

## 2017-01-10 DIAGNOSIS — R19.7 DIARRHEA, UNSPECIFIED: ICD-10-CM

## 2017-01-10 DIAGNOSIS — Z51.89 ENCOUNTER FOR OTHER SPECIFIED AFTERCARE: ICD-10-CM

## 2017-01-10 DIAGNOSIS — R00.1 BRADYCARDIA, UNSPECIFIED: ICD-10-CM

## 2017-01-10 DIAGNOSIS — E78.5 HYPERLIPIDEMIA, UNSPECIFIED: ICD-10-CM

## 2017-01-10 DIAGNOSIS — E78.00 PURE HYPERCHOLESTEROLEMIA, UNSPECIFIED: ICD-10-CM

## 2017-01-10 DIAGNOSIS — G35 MULTIPLE SCLEROSIS: ICD-10-CM

## 2017-01-10 DIAGNOSIS — Z90.710 ACQUIRED ABSENCE OF BOTH CERVIX AND UTERUS: Chronic | ICD-10-CM

## 2017-01-10 DIAGNOSIS — R55 SYNCOPE AND COLLAPSE: ICD-10-CM

## 2017-01-10 DIAGNOSIS — M21.371 FOOT DROP, RIGHT FOOT: ICD-10-CM

## 2017-01-10 DIAGNOSIS — D72.828 OTHER ELEVATED WHITE BLOOD CELL COUNT: ICD-10-CM

## 2017-01-10 DIAGNOSIS — K21.9 GASTRO-ESOPHAGEAL REFLUX DISEASE WITHOUT ESOPHAGITIS: ICD-10-CM

## 2017-01-10 DIAGNOSIS — D64.9 ANEMIA, UNSPECIFIED: ICD-10-CM

## 2017-01-10 DIAGNOSIS — R41.82 ALTERED MENTAL STATUS, UNSPECIFIED: ICD-10-CM

## 2017-01-10 DIAGNOSIS — R47.81 SLURRED SPEECH: ICD-10-CM

## 2017-01-10 DIAGNOSIS — I10 ESSENTIAL (PRIMARY) HYPERTENSION: ICD-10-CM

## 2017-01-10 DIAGNOSIS — N39.0 URINARY TRACT INFECTION, SITE NOT SPECIFIED: ICD-10-CM

## 2017-01-10 PROCEDURE — 80061 LIPID PANEL: CPT

## 2017-01-10 PROCEDURE — 84484 ASSAY OF TROPONIN QUANT: CPT

## 2017-01-10 PROCEDURE — 85027 COMPLETE CBC AUTOMATED: CPT

## 2017-01-10 PROCEDURE — 93306 TTE W/DOPPLER COMPLETE: CPT

## 2017-01-10 PROCEDURE — 97162 PT EVAL MOD COMPLEX 30 MIN: CPT

## 2017-01-10 PROCEDURE — 80048 BASIC METABOLIC PNL TOTAL CA: CPT

## 2017-01-10 PROCEDURE — 82607 VITAMIN B-12: CPT

## 2017-01-10 PROCEDURE — 97116 GAIT TRAINING THERAPY: CPT

## 2017-01-10 PROCEDURE — 93880 EXTRACRANIAL BILAT STUDY: CPT

## 2017-01-10 PROCEDURE — 70450 CT HEAD/BRAIN W/O DYE: CPT

## 2017-01-10 PROCEDURE — 80053 COMPREHEN METABOLIC PANEL: CPT

## 2017-01-10 PROCEDURE — 71045 X-RAY EXAM CHEST 1 VIEW: CPT

## 2017-01-10 PROCEDURE — 96376 TX/PRO/DX INJ SAME DRUG ADON: CPT

## 2017-01-10 PROCEDURE — 99285 EMERGENCY DEPT VISIT HI MDM: CPT | Mod: 25

## 2017-01-10 PROCEDURE — 82746 ASSAY OF FOLIC ACID SERUM: CPT

## 2017-01-10 PROCEDURE — 97530 THERAPEUTIC ACTIVITIES: CPT

## 2017-01-10 PROCEDURE — 85610 PROTHROMBIN TIME: CPT

## 2017-01-10 PROCEDURE — 84443 ASSAY THYROID STIM HORMONE: CPT

## 2017-01-10 PROCEDURE — 72156 MRI NECK SPINE W/O & W/DYE: CPT

## 2017-01-10 PROCEDURE — 85730 THROMBOPLASTIN TIME PARTIAL: CPT

## 2017-01-10 PROCEDURE — 96374 THER/PROPH/DIAG INJ IV PUSH: CPT

## 2017-01-10 PROCEDURE — 82550 ASSAY OF CK (CPK): CPT

## 2017-01-10 PROCEDURE — A9579: CPT

## 2017-01-10 PROCEDURE — 70552 MRI BRAIN STEM W/DYE: CPT

## 2017-01-10 PROCEDURE — 70551 MRI BRAIN STEM W/O DYE: CPT

## 2017-01-10 PROCEDURE — 70544 MR ANGIOGRAPHY HEAD W/O DYE: CPT

## 2017-01-10 PROCEDURE — 99222 1ST HOSP IP/OBS MODERATE 55: CPT

## 2017-01-10 PROCEDURE — 72157 MRI CHEST SPINE W/O & W/DYE: CPT

## 2017-01-10 PROCEDURE — 82553 CREATINE MB FRACTION: CPT

## 2017-01-10 PROCEDURE — 99239 HOSP IP/OBS DSCHRG MGMT >30: CPT

## 2017-01-10 PROCEDURE — 93005 ELECTROCARDIOGRAM TRACING: CPT

## 2017-01-10 RX ADMIN — Medication 58 MILLIGRAM(S): at 06:00

## 2017-01-10 RX ADMIN — PANTOPRAZOLE SODIUM 40 MILLIGRAM(S): 20 TABLET, DELAYED RELEASE ORAL at 06:00

## 2017-01-10 RX ADMIN — Medication 81 MILLIGRAM(S): at 11:13

## 2017-01-10 NOTE — DIETITIAN INITIAL EVALUATION ADULT. - PROBLEM SELECTOR PLAN 2
- questionable hx  - follow up lipid panel   - lipitor 40 started secondary to stroke hx will adjust to goal LDL

## 2017-01-10 NOTE — DIETITIAN INITIAL EVALUATION ADULT. - OTHER INFO
Pt's LOS 7 days. Pt adm from home. Pt tolerating regular consistency. Pt with no difficulty swallowing. As per pt, pt to be d/c'd to rehab today.

## 2017-01-11 PROCEDURE — 99232 SBSQ HOSP IP/OBS MODERATE 35: CPT

## 2017-01-11 PROCEDURE — 99223 1ST HOSP IP/OBS HIGH 75: CPT

## 2017-01-11 PROCEDURE — 93010 ELECTROCARDIOGRAM REPORT: CPT

## 2017-01-12 DIAGNOSIS — Z90.710 ACQUIRED ABSENCE OF BOTH CERVIX AND UTERUS: ICD-10-CM

## 2017-01-12 DIAGNOSIS — Z28.21 IMMUNIZATION NOT CARRIED OUT BECAUSE OF PATIENT REFUSAL: ICD-10-CM

## 2017-01-12 DIAGNOSIS — I48.2 CHRONIC ATRIAL FIBRILLATION: ICD-10-CM

## 2017-01-12 DIAGNOSIS — G35 MULTIPLE SCLEROSIS: ICD-10-CM

## 2017-01-12 DIAGNOSIS — D72.829 ELEVATED WHITE BLOOD CELL COUNT, UNSPECIFIED: ICD-10-CM
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
## 2017-01-12 DIAGNOSIS — Z87.891 PERSONAL HISTORY OF NICOTINE DEPENDENCE: ICD-10-CM

## 2017-01-12 DIAGNOSIS — I10 ESSENTIAL (PRIMARY) HYPERTENSION: ICD-10-CM

## 2017-01-12 DIAGNOSIS — T38.0X5A ADVERSE EFFECT OF GLUCOCORTICOIDS AND SYNTHETIC ANALOGUES, INITIAL ENCOUNTER: ICD-10-CM

## 2017-01-12 DIAGNOSIS — Z91.14 PATIENT'S OTHER NONCOMPLIANCE WITH MEDICATION REGIMEN: ICD-10-CM

## 2017-01-12 DIAGNOSIS — E78.5 HYPERLIPIDEMIA, UNSPECIFIED: ICD-10-CM

## 2017-01-12 DIAGNOSIS — Z79.82 LONG TERM (CURRENT) USE OF ASPIRIN: ICD-10-CM

## 2017-01-12 PROCEDURE — 99232 SBSQ HOSP IP/OBS MODERATE 35: CPT

## 2017-01-12 PROCEDURE — 99223 1ST HOSP IP/OBS HIGH 75: CPT

## 2017-01-13 PROCEDURE — 99232 SBSQ HOSP IP/OBS MODERATE 35: CPT

## 2017-01-13 PROCEDURE — 93227 XTRNL ECG REC<48 HR R&I: CPT

## 2017-01-14 PROCEDURE — 99233 SBSQ HOSP IP/OBS HIGH 50: CPT

## 2017-01-14 PROCEDURE — 93010 ELECTROCARDIOGRAM REPORT: CPT

## 2017-01-14 PROCEDURE — 99232 SBSQ HOSP IP/OBS MODERATE 35: CPT

## 2017-01-14 PROCEDURE — 71010: CPT | Mod: 26

## 2017-01-15 PROCEDURE — 99232 SBSQ HOSP IP/OBS MODERATE 35: CPT

## 2017-01-16 PROCEDURE — 99232 SBSQ HOSP IP/OBS MODERATE 35: CPT

## 2017-01-17 PROCEDURE — 99233 SBSQ HOSP IP/OBS HIGH 50: CPT

## 2017-01-18 PROCEDURE — 99233 SBSQ HOSP IP/OBS HIGH 50: CPT

## 2017-01-18 PROCEDURE — 99232 SBSQ HOSP IP/OBS MODERATE 35: CPT

## 2017-01-19 PROCEDURE — 99233 SBSQ HOSP IP/OBS HIGH 50: CPT

## 2017-01-20 PROCEDURE — 99232 SBSQ HOSP IP/OBS MODERATE 35: CPT

## 2017-01-21 PROCEDURE — 99232 SBSQ HOSP IP/OBS MODERATE 35: CPT

## 2017-01-22 PROCEDURE — 99232 SBSQ HOSP IP/OBS MODERATE 35: CPT

## 2017-01-23 PROCEDURE — 85045 AUTOMATED RETICULOCYTE COUNT: CPT

## 2017-01-23 PROCEDURE — 97530 THERAPEUTIC ACTIVITIES: CPT

## 2017-01-23 PROCEDURE — 80053 COMPREHEN METABOLIC PANEL: CPT

## 2017-01-23 PROCEDURE — 83615 LACTATE (LD) (LDH) ENZYME: CPT

## 2017-01-23 PROCEDURE — 97167 OT EVAL HIGH COMPLEX 60 MIN: CPT

## 2017-01-23 PROCEDURE — 93226 XTRNL ECG REC<48 HR SCAN A/R: CPT

## 2017-01-23 PROCEDURE — 92507 TX SP LANG VOICE COMM INDIV: CPT

## 2017-01-23 PROCEDURE — 84484 ASSAY OF TROPONIN QUANT: CPT

## 2017-01-23 PROCEDURE — 83010 ASSAY OF HAPTOGLOBIN QUANT: CPT

## 2017-01-23 PROCEDURE — 92523 SPEECH SOUND LANG COMPREHEN: CPT

## 2017-01-23 PROCEDURE — 97001: CPT

## 2017-01-23 PROCEDURE — 99238 HOSP IP/OBS DSCHRG MGMT 30/<: CPT

## 2017-01-23 PROCEDURE — 81003 URINALYSIS AUTO W/O SCOPE: CPT

## 2017-01-23 PROCEDURE — 80048 BASIC METABOLIC PNL TOTAL CA: CPT

## 2017-01-23 PROCEDURE — 97110 THERAPEUTIC EXERCISES: CPT

## 2017-01-23 PROCEDURE — 97535 SELF CARE MNGMENT TRAINING: CPT

## 2017-01-23 PROCEDURE — 85027 COMPLETE CBC AUTOMATED: CPT

## 2017-01-23 PROCEDURE — 93225 XTRNL ECG REC<48 HRS REC: CPT

## 2017-01-23 PROCEDURE — 71045 X-RAY EXAM CHEST 1 VIEW: CPT

## 2017-01-23 PROCEDURE — 83605 ASSAY OF LACTIC ACID: CPT

## 2017-01-23 PROCEDURE — 93005 ELECTROCARDIOGRAM TRACING: CPT

## 2017-01-23 PROCEDURE — 80069 RENAL FUNCTION PANEL: CPT

## 2017-01-23 PROCEDURE — 85025 COMPLETE CBC W/AUTO DIFF WBC: CPT

## 2017-01-23 PROCEDURE — 97003: CPT

## 2017-01-23 PROCEDURE — 87086 URINE CULTURE/COLONY COUNT: CPT

## 2017-01-23 PROCEDURE — 97163 PT EVAL HIGH COMPLEX 45 MIN: CPT

## 2017-01-23 PROCEDURE — 87040 BLOOD CULTURE FOR BACTERIA: CPT

## 2017-01-23 PROCEDURE — 97116 GAIT TRAINING THERAPY: CPT

## 2017-08-01 ENCOUNTER — OUTPATIENT (OUTPATIENT)
Dept: OUTPATIENT SERVICES | Facility: HOSPITAL | Age: 67
LOS: 1 days | End: 2017-08-01
Payer: MEDICARE

## 2017-08-01 DIAGNOSIS — M62.81 MUSCLE WEAKNESS (GENERALIZED): ICD-10-CM

## 2017-08-01 DIAGNOSIS — Z90.710 ACQUIRED ABSENCE OF BOTH CERVIX AND UTERUS: Chronic | ICD-10-CM

## 2017-08-01 PROBLEM — I63.9 CEREBRAL INFARCTION, UNSPECIFIED: Chronic | Status: ACTIVE | Noted: 2017-01-03

## 2017-08-01 PROBLEM — I10 ESSENTIAL (PRIMARY) HYPERTENSION: Chronic | Status: ACTIVE | Noted: 2017-01-03

## 2017-08-01 PROBLEM — E78.5 HYPERLIPIDEMIA, UNSPECIFIED: Chronic | Status: ACTIVE | Noted: 2017-01-03

## 2017-08-01 PROCEDURE — 70553 MRI BRAIN STEM W/O & W/DYE: CPT

## 2017-08-01 PROCEDURE — 70553 MRI BRAIN STEM W/O & W/DYE: CPT | Mod: 26

## 2017-08-01 PROCEDURE — A9579: CPT

## 2018-06-08 ENCOUNTER — INPATIENT (INPATIENT)
Facility: HOSPITAL | Age: 68
LOS: 2 days | Discharge: HOME | DRG: 312 | End: 2018-06-11
Attending: HOSPITALIST | Admitting: INTERNAL MEDICINE
Payer: MEDICARE

## 2018-06-08 VITALS
WEIGHT: 134.92 LBS | DIASTOLIC BLOOD PRESSURE: 76 MMHG | HEART RATE: 68 BPM | RESPIRATION RATE: 16 BRPM | TEMPERATURE: 98 F | OXYGEN SATURATION: 100 % | SYSTOLIC BLOOD PRESSURE: 142 MMHG

## 2018-06-08 DIAGNOSIS — R55 SYNCOPE AND COLLAPSE: ICD-10-CM

## 2018-06-08 DIAGNOSIS — Z90.710 ACQUIRED ABSENCE OF BOTH CERVIX AND UTERUS: Chronic | ICD-10-CM

## 2018-06-08 LAB
ALBUMIN SERPL ELPH-MCNC: 4.8 G/DL — SIGNIFICANT CHANGE UP (ref 3.3–5)
ALP SERPL-CCNC: 112 U/L — SIGNIFICANT CHANGE UP (ref 40–120)
ALT FLD-CCNC: 13 U/L — SIGNIFICANT CHANGE UP (ref 10–45)
ANION GAP SERPL CALC-SCNC: 13 MMOL/L — SIGNIFICANT CHANGE UP (ref 5–17)
APPEARANCE UR: ABNORMAL
AST SERPL-CCNC: 18 U/L — SIGNIFICANT CHANGE UP (ref 10–40)
BACTERIA # UR AUTO: ABNORMAL /HPF
BASE EXCESS BLDV CALC-SCNC: -1.3 MMOL/L — SIGNIFICANT CHANGE UP (ref -2–2)
BASOPHILS # BLD AUTO: 0 K/UL — SIGNIFICANT CHANGE UP (ref 0–0.2)
BASOPHILS NFR BLD AUTO: 0.3 % — SIGNIFICANT CHANGE UP (ref 0–2)
BILIRUB SERPL-MCNC: 0.3 MG/DL — SIGNIFICANT CHANGE UP (ref 0.2–1.2)
BILIRUB UR-MCNC: NEGATIVE — SIGNIFICANT CHANGE UP
BUN SERPL-MCNC: 18 MG/DL — SIGNIFICANT CHANGE UP (ref 7–23)
CA-I SERPL-SCNC: 1.16 MMOL/L — SIGNIFICANT CHANGE UP (ref 1.12–1.3)
CALCIUM SERPL-MCNC: 9.2 MG/DL — SIGNIFICANT CHANGE UP (ref 8.4–10.5)
CHLORIDE BLDV-SCNC: 105 MMOL/L — SIGNIFICANT CHANGE UP (ref 96–108)
CHLORIDE SERPL-SCNC: 100 MMOL/L — SIGNIFICANT CHANGE UP (ref 96–108)
CO2 BLDV-SCNC: 26 MMOL/L — SIGNIFICANT CHANGE UP (ref 22–30)
CO2 SERPL-SCNC: 20 MMOL/L — LOW (ref 22–31)
COLOR SPEC: YELLOW — SIGNIFICANT CHANGE UP
CREAT SERPL-MCNC: 1.01 MG/DL — SIGNIFICANT CHANGE UP (ref 0.5–1.3)
DIFF PNL FLD: ABNORMAL
EOSINOPHIL # BLD AUTO: 0 K/UL — SIGNIFICANT CHANGE UP (ref 0–0.5)
EOSINOPHIL NFR BLD AUTO: 0.1 % — SIGNIFICANT CHANGE UP (ref 0–6)
EPI CELLS # UR: SIGNIFICANT CHANGE UP /HPF
GAS PNL BLDV: 141 MMOL/L — SIGNIFICANT CHANGE UP (ref 136–145)
GAS PNL BLDV: SIGNIFICANT CHANGE UP
GAS PNL BLDV: SIGNIFICANT CHANGE UP
GLUCOSE BLDV-MCNC: 127 MG/DL — HIGH (ref 70–99)
GLUCOSE SERPL-MCNC: 137 MG/DL — HIGH (ref 70–99)
GLUCOSE UR QL: NEGATIVE — SIGNIFICANT CHANGE UP
HCO3 BLDV-SCNC: 25 MMOL/L — SIGNIFICANT CHANGE UP (ref 21–29)
HCT VFR BLD CALC: 44.1 % — SIGNIFICANT CHANGE UP (ref 34.5–45)
HCT VFR BLDA CALC: 46 % — SIGNIFICANT CHANGE UP (ref 39–50)
HGB BLD CALC-MCNC: 15.1 G/DL — SIGNIFICANT CHANGE UP (ref 11.5–15.5)
HGB BLD-MCNC: 15.2 G/DL — SIGNIFICANT CHANGE UP (ref 11.5–15.5)
HYALINE CASTS # UR AUTO: ABNORMAL
KETONES UR-MCNC: NEGATIVE — SIGNIFICANT CHANGE UP
LACTATE BLDV-MCNC: 2.9 MMOL/L — HIGH (ref 0.7–2)
LEUKOCYTE ESTERASE UR-ACNC: ABNORMAL
LYMPHOCYTES # BLD AUTO: 0.3 K/UL — LOW (ref 1–3.3)
LYMPHOCYTES # BLD AUTO: 2 % — LOW (ref 13–44)
MCHC RBC-ENTMCNC: 34.6 GM/DL — SIGNIFICANT CHANGE UP (ref 32–36)
MCHC RBC-ENTMCNC: 34.6 PG — HIGH (ref 27–34)
MCV RBC AUTO: 100 FL — SIGNIFICANT CHANGE UP (ref 80–100)
MONOCYTES # BLD AUTO: 0.7 K/UL — SIGNIFICANT CHANGE UP (ref 0–0.9)
MONOCYTES NFR BLD AUTO: 5.7 % — SIGNIFICANT CHANGE UP (ref 2–14)
NEUTROPHILS # BLD AUTO: 12.1 K/UL — HIGH (ref 1.8–7.4)
NEUTROPHILS NFR BLD AUTO: 91.8 % — HIGH (ref 43–77)
NITRITE UR-MCNC: NEGATIVE — SIGNIFICANT CHANGE UP
PCO2 BLDV: 50 MMHG — SIGNIFICANT CHANGE UP (ref 35–50)
PH BLDV: 7.32 — LOW (ref 7.35–7.45)
PH UR: 6 — SIGNIFICANT CHANGE UP (ref 5–8)
PLATELET # BLD AUTO: 290 K/UL — SIGNIFICANT CHANGE UP (ref 150–400)
PO2 BLDV: 27 MMHG — SIGNIFICANT CHANGE UP (ref 25–45)
POTASSIUM BLDV-SCNC: 3.7 MMOL/L — SIGNIFICANT CHANGE UP (ref 3.5–5.3)
POTASSIUM SERPL-MCNC: 3.6 MMOL/L — SIGNIFICANT CHANGE UP (ref 3.5–5.3)
POTASSIUM SERPL-SCNC: 3.6 MMOL/L — SIGNIFICANT CHANGE UP (ref 3.5–5.3)
PROT SERPL-MCNC: 7.9 G/DL — SIGNIFICANT CHANGE UP (ref 6–8.3)
PROT UR-MCNC: 30 MG/DL
RBC # BLD: 4.41 M/UL — SIGNIFICANT CHANGE UP (ref 3.8–5.2)
RBC # FLD: 10.7 % — SIGNIFICANT CHANGE UP (ref 10.3–14.5)
RBC CASTS # UR COMP ASSIST: ABNORMAL /HPF (ref 0–2)
SAO2 % BLDV: 42 % — LOW (ref 67–88)
SODIUM SERPL-SCNC: 143 MMOL/L — SIGNIFICANT CHANGE UP (ref 135–145)
SP GR SPEC: 1.03 — HIGH (ref 1.01–1.02)
TROPONIN T SERPL-MCNC: <0.01 NG/ML — SIGNIFICANT CHANGE UP (ref 0–0.06)
UROBILINOGEN FLD QL: NEGATIVE — SIGNIFICANT CHANGE UP
WBC # BLD: 13.2 K/UL — HIGH (ref 3.8–10.5)
WBC # FLD AUTO: 13.2 K/UL — HIGH (ref 3.8–10.5)
WBC UR QL: SIGNIFICANT CHANGE UP /HPF (ref 0–5)

## 2018-06-08 PROCEDURE — 93010 ELECTROCARDIOGRAM REPORT: CPT

## 2018-06-08 PROCEDURE — 99284 EMERGENCY DEPT VISIT MOD MDM: CPT | Mod: 25,GC

## 2018-06-08 RX ORDER — CEFTRIAXONE 500 MG/1
1 INJECTION, POWDER, FOR SOLUTION INTRAMUSCULAR; INTRAVENOUS ONCE
Qty: 0 | Refills: 0 | Status: DISCONTINUED | OUTPATIENT
Start: 2018-06-08 | End: 2018-06-08

## 2018-06-08 RX ORDER — SODIUM CHLORIDE 9 MG/ML
1000 INJECTION INTRAMUSCULAR; INTRAVENOUS; SUBCUTANEOUS ONCE
Qty: 0 | Refills: 0 | Status: COMPLETED | OUTPATIENT
Start: 2018-06-08 | End: 2018-06-08

## 2018-06-08 RX ORDER — CEFTRIAXONE 500 MG/1
1 INJECTION, POWDER, FOR SOLUTION INTRAMUSCULAR; INTRAVENOUS ONCE
Qty: 0 | Refills: 0 | Status: COMPLETED | OUTPATIENT
Start: 2018-06-08 | End: 2018-06-08

## 2018-06-08 RX ADMIN — SODIUM CHLORIDE 1000 MILLILITER(S): 9 INJECTION INTRAMUSCULAR; INTRAVENOUS; SUBCUTANEOUS at 20:13

## 2018-06-08 RX ADMIN — CEFTRIAXONE 100 GRAM(S): 500 INJECTION, POWDER, FOR SOLUTION INTRAMUSCULAR; INTRAVENOUS at 21:53

## 2018-06-08 NOTE — ED PROVIDER NOTE - PMH
HLD (hyperlipidemia)    HLD (hyperlipidemia)    HTN (hypertension)    HTN (hypertension)    Stroke    Stroke

## 2018-06-08 NOTE — ED PROVIDER NOTE - MEDICAL DECISION MAKING DETAILS
ATTG: : patient with episode of syncope,  concern for cardiac / neuro causes. possible MS flair. check labs, check urine, ivf, re eval for dispo

## 2018-06-08 NOTE — ED PROVIDER NOTE - PHYSICAL EXAMINATION
AAOX3, NAD, NCAT, EOMI, PERRL, MMM, Neck Supple, RRR, CTABL, ABD S/NT/ND,  EXT warm, well perfused, Distal Pulses Intact, No Rash,  Follows commands, fluent speech, delayed before pt initiates response to questions, Face symmetric,  MAEx4, Sensation to soft touch grossly intact, normal tone.

## 2018-06-08 NOTE — CONSULT NOTE ADULT - ATTENDING COMMENTS
Patient was personally seen and examined this morning.  Patient with an episode of syncope yesterday (does not sound concerning for seizures).  Agree with cardiac workup at this time and MRI of the brain w/wo contrast to make sure no new MS Relapse.    Patient sees Dr. Reyes as an outpatient for her MS.    Continue home MS drugs.

## 2018-06-08 NOTE — ED PROVIDER NOTE - ATTENDING CONTRIBUTION TO CARE
69 y/o f with pmhx HTN HLD MS presents for episode of syncope, witnessed by family earlier this evening. per patient she was making cupcakes with her daughters and then felt like she was going to pass out, went to the couch and fainted. Does not recall what happened after that. no fever or chills. no dizzines or headache. no tongue biting. no urinary symptoms. had this in past and was an MS flair. same today.   Gen.  no acute distress  HEENT:  nc /at, no tongue lacerations,   Lungs:  ctab/l   CVS: S1S2   Abd;  soft non tender  Ext: no edema no erythema  Neuro: aaox3   MSK: right upper 5/5 , left upper 5/5, right lower 4/5, left lower 5/5. (baseline per pt)

## 2018-06-08 NOTE — CONSULT NOTE ADULT - SUBJECTIVE AND OBJECTIVE BOX
CAROLINA Brown is a 68y old  Female who presents with a chief complaint of syncope    HPI:  69 y/o Right handed woman with hx of HTN, HLD, ? hx of afib previously on Xarelto, ? hx of strokes, Multiple sclerosis p/w episode of syncope earlier today. Pt says she was standing in the kitchen cooking when she had sudden onset of dizziness. She syncopized and fell to the ground. Family found her and she was slurring her speech and she vomited twice. She returned to baseline in a few minutes. She has these episodes of syncope or near syncope every so often. She was diagnosed with MS Jan 2017, after she has symptoms of dizziness, tremor, headache, and blurred vision. MRI head extensive white matter disease and C/T spine with demyelinating lesions consistent with likely MS. Pt followed by       Dr. Fallon in [place] and is currently on Gilenya 0.5mg daily. Has had chronic tremors and walks with a walker since January 2017.      MEDICATIONS  (STANDING): Gilenya , Aspirin, atorvastatin    MEDICATIONS  (PRN):    PAST MEDICAL & SURGICAL HISTORY:  Multiple Sclerosis  HLD (hyperlipidemia)  HTN (hypertension)  HTN (hypertension)  HLD (hyperlipidemia)    NO SIGNIFICANT PAST SURGICAL HISTORY: H/O: hysterectomy    FAMILY HISTORY:  No pertinent family history in first degree relatives    Allergies: No Known Allergies    Intolerances    SHx - No smoking, No ETOH, No drug abuse    REVIEW OF SYSTEMS:    Constitutional: No fever, weight loss, or fatigue  Eyes: No eye pain, visual disturbances, or discharge  ENMT:  No difficulty hearing, tinnitus, vertigo; No sinus or throat pain  Neck: No pain or stiffness  Respiratory: No cough, wheezing, or shortness of breath  Cardiovascular: No chest pain, palpitations, or leg swelling  Gastrointestinal: No abdominal pain, nausea, vomiting, diarrhea or constipation.   Genitourinary: No dysuria, frequency, hematuria, or incontinence  Neurological: As per HPI  Skin: No itching, burning, rashes, or lesions   Endocrine: No heat or cold intolerance; No hair loss  Musculoskeletal: No joint pain or swelling; No muscle, back, or extremity pain  Psychiatric: No depression, anxiety, mood swings, or difficulty sleeping  Heme/Lymph: No easy bruising or bleeding; No enlarged glands      Vital Signs Last 24 Hrs  T(C): 36.7 (08 Jun 2018 20:02), Max: 36.8 (08 Jun 2018 18:31)  T(F): 98.1 (08 Jun 2018 20:02), Max: 98.2 (08 Jun 2018 18:31)  HR: 84 (08 Jun 2018 20:02) (68 - 84)  BP: 132/79 (08 Jun 2018 20:02) (132/79 - 142/76)  BP(mean): --  RR: 17 (08 Jun 2018 20:02) (16 - 17)  SpO2: 98% (08 Jun 2018 20:02) (98% - 100%)    General Exam:   General appearance: No acute distress    Cardiac:  Pulm:                 Neurological Exam:  Mental Status: Orientated to self, date and place.  Attention intact.  No dysarthria. Speech fluent.  Cranial Nerves:   PERRL, EOMI, VFF, no nystagmus.    CN V1-3 intact to light touch .  No facial asymmetry.  Hearing intact bilaterally.  Tongue  midline.  Sternocleidomastoid and Trapezius intact bilaterally.    Motor:   Tone: normal.                  Strength:     [] Upper extremity                      Delt       Bicep    Tricep                                                  R         5/5        5/5        5/5       5/5                                               L          5/5        5/5        5/5       5/5  [] Lower extremity                       HF          KE          KF        DF         PF                                               R        5/5        5/5        5/5       5/5       5/5                                               L         5/5        5/5       5/5       5/5        5/5             Dysmetria: None to finger-nose-finger or heel-shin-heel  No truncal ataxia.    Tremor: No resting, postural or action tremor.  No myoclonus.    Sensation: intact to light touch, pinprick, vibration and proprioception    Deep Tendon Reflexes:     Biceps          Triceps      BR        Patellar        Ankle         Babinski                                  R       2+                   2+           2+            2+               2+           downgoing                                  L        2+                  2+           2+            2+               2+           downgoing    Gait: at baseline with walker.      Other:    06-08    143  |  100  |  18  ----------------------------<  137<H>  3.6   |  20<L>  |  1.01    Ca    9.2      08 Jun 2018 20:21    TPro  7.9  /  Alb  4.8  /  TBili  0.3  /  DBili  x   /  AST  18  /  ALT  13  /  AlkPhos  112  06-08                            15.2   13.2  )-----------( 290      ( 08 Jun 2018 20:21 )             44.1       Radiology    CT:   MRI  EKG:  tele:  TTE:  EEG: Note in progress  CAROLINA Brown is a 68y old  Female who presents with a chief complaint of syncope    HPI:  69 y/o Right handed woman with hx of HTN, HLD, ? hx of afib previously on Xarelto, ? hx of strokes, Multiple sclerosis p/w episode of syncope earlier today. Pt says she was standing in the kitchen cooking when she had sudden onset of dizziness. She syncopized and fell to the ground. Family found her and she was slurring her speech and she vomited twice. She returned to baseline in a few minutes. She has these episodes of syncope or near syncope every so often. She was diagnosed with MS Jan 2017, after she has symptoms of dizziness, tremor, headache, and blurred vision. MRI head extensive white matter disease and C/T spine with demyelinating lesions consistent with likely MS. Pt followed by       Dr. Fallon in [place] and is currently on Gilenya 0.5mg daily. Has had chronic tremors and walks with a walker since January 2017.      MEDICATIONS  (STANDING): Gilenya , Aspirin, atorvastatin    MEDICATIONS  (PRN):    PAST MEDICAL & SURGICAL HISTORY:  Multiple Sclerosis  HLD (hyperlipidemia)  HTN (hypertension)  HTN (hypertension)  HLD (hyperlipidemia)    NO SIGNIFICANT PAST SURGICAL HISTORY: H/O: hysterectomy    FAMILY HISTORY:  No pertinent family history in first degree relatives    Allergies: No Known Allergies    Intolerances    SHx - No smoking, No ETOH, No drug abuse    REVIEW OF SYSTEMS:    Constitutional: No fever, weight loss, or fatigue  Eyes: No eye pain, visual disturbances, or discharge  ENMT:  No difficulty hearing, tinnitus, vertigo; No sinus or throat pain  Neck: No pain or stiffness  Respiratory: No cough, wheezing, or shortness of breath  Cardiovascular: No chest pain, palpitations, or leg swelling  Gastrointestinal: No abdominal pain, nausea, vomiting, diarrhea or constipation.   Genitourinary: No dysuria, frequency, hematuria, or incontinence  Neurological: As per HPI  Skin: No itching, burning, rashes, or lesions   Endocrine: No heat or cold intolerance; No hair loss  Musculoskeletal: No joint pain or swelling; No muscle, back, or extremity pain  Psychiatric: No depression, anxiety, mood swings, or difficulty sleeping  Heme/Lymph: No easy bruising or bleeding; No enlarged glands      Vital Signs Last 24 Hrs  T(C): 36.7 (08 Jun 2018 20:02), Max: 36.8 (08 Jun 2018 18:31)  T(F): 98.1 (08 Jun 2018 20:02), Max: 98.2 (08 Jun 2018 18:31)  HR: 84 (08 Jun 2018 20:02) (68 - 84)  BP: 132/79 (08 Jun 2018 20:02) (132/79 - 142/76)  BP(mean): --  RR: 17 (08 Jun 2018 20:02) (16 - 17)  SpO2: 98% (08 Jun 2018 20:02) (98% - 100%)    General Exam:   General appearance: No acute distress    Cardiac:  Pulm:                 Neurological Exam:  Mental Status: Orientated to self, date and place.  Attention intact.  No dysarthria. Speech fluent.  Cranial Nerves:   PERRL, EOMI, VFF, no nystagmus.    CN V1-3 intact to light touch .  No facial asymmetry.  Hearing intact bilaterally.  Tongue  midline.  Sternocleidomastoid and Trapezius intact bilaterally.    Motor:   Tone: normal.                  Strength:     [] Upper extremity                      Delt       Bicep    Tricep                                                  R         5/5        5/5        5/5       5/5                                               L          5/5        5/5        5/5       5/5  [] Lower extremity                       HF          KE          KF        DF         PF                                               R        5/5        5/5        5/5       5/5       5/5                                               L         5/5        5/5       5/5       5/5        5/5             Dysmetria: None to finger-nose-finger or heel-shin-heel  No truncal ataxia.    Tremor: No resting, postural or action tremor.  No myoclonus.    Sensation: intact to light touch, pinprick, vibration and proprioception    Deep Tendon Reflexes:     Biceps          Triceps      BR        Patellar        Ankle         Babinski                                  R       2+                   2+           2+            2+               2+           downgoing                                  L        2+                  2+           2+            2+               2+           downgoing    Gait: at baseline with walker.      Other:    06-08    143  |  100  |  18  ----------------------------<  137<H>  3.6   |  20<L>  |  1.01    Ca    9.2      08 Jun 2018 20:21    TPro  7.9  /  Alb  4.8  /  TBili  0.3  /  DBili  x   /  AST  18  /  ALT  13  /  AlkPhos  112  06-08                            15.2   13.2  )-----------( 290      ( 08 Jun 2018 20:21 )             44.1       Radiology    CT:   MRI  EKG:  tele:  TTE:  EEG: Note in progress  CAROLINA Brown is a 68y old  Female who presents with a chief complaint of syncope    HPI:  69 y/o Right handed woman with hx of HTN, HLD, ? hx of afib previously on Xarelto, ? hx of strokes, Multiple sclerosis p/w episode of syncope earlier today. Pt says she was standing in the kitchen cooking when she had sudden onset of dizziness. She syncopized and fell to the ground. Family found her and she was slurring her speech and she vomited twice. She returned to baseline in a few minutes. She has these episodes of syncope or near syncope every so often. She was diagnosed with MS 2017, after she has symptoms of dizziness, tremor, headache, and blurred vision. MRI head extensive white matter disease and C/T spine with demyelinating lesions consistent with likely MS. Pt followed by       Dr. Fallon in [place] and is currently on Gilenya 0.5mg daily. She has had chronic tremors and walks with a walker since 2017.      MEDICATIONS  (STANDING): Gilenya , Aspirin, atorvastatin    MEDICATIONS  (PRN):    PAST MEDICAL & SURGICAL HISTORY:  Multiple Sclerosis  HLD (hyperlipidemia)  HTN (hypertension)  HTN (hypertension)  HLD (hyperlipidemia)    NO SIGNIFICANT PAST SURGICAL HISTORY: H/O: hysterectomy    FAMILY HISTORY:  No pertinent family history in first degree relatives    Allergies: No Known Allergies    Intolerances    SHx - Former smoker 50yrs, 75pack/yrs , No ETOH, No drug abuse    REVIEW OF SYSTEMS:    Constitutional: No fever, weight loss, or fatigue  Eyes: No eye pain, visual disturbances, or discharge  ENMT:  No difficulty hearing, tinnitus, vertigo; No sinus or throat pain  Neck: No pain or stiffness  Respiratory: No cough, wheezing, or shortness of breath  Cardiovascular: No chest pain, palpitations, or leg swelling  Gastrointestinal: No abdominal pain, nausea, vomiting, diarrhea or constipation.   Genitourinary: No dysuria, frequency, hematuria, or incontinence  Neurological: As per HPI  Skin: No itching, burning, rashes, or lesions   Endocrine: No heat or cold intolerance; No hair loss  Musculoskeletal: No joint pain or swelling; No muscle, back, or extremity pain  Psychiatric: No depression, anxiety, mood swings, or difficulty sleeping  Heme/Lymph: No easy bruising or bleeding; No enlarged glands      Vital Signs Last 24 Hrs  T(C): 36.7 (2018 20:02), Max: 36.8 (2018 18:31)  T(F): 98.1 (2018 20:02), Max: 98.2 (2018 18:31)  HR: 84 (2018 20:02) (68 - 84)  BP: 132/79 (2018 20:02) (132/79 - 142/76)  BP(mean): --  RR: 17 (2018 20:02) (16 - 17)  SpO2: 98% (2018 20:02) (98% - 100%)    Orthostatics: Laying flat- /75 HR 93; Sitting- /82 HR -;   Standing- /85 HR - (no dizziness noted with standing)    General Exam:   General appearance: No acute distress    Cardiac: RRR  Resp: breathing comfortably         Neurological Exam:  Mental Status: Orientated to self, date and place.  Attention intact.  No dysarthria. Speech fluent.  Cranial Nerves:   PERRL, EOMI, VFF, no nystagmus.    CN V1-3 intact to light touch .  No facial asymmetry.  Hearing intact bilaterally.  Tongue  midline.  Sternocleidomastoid and Trapezius intact bilaterally.    Motor:   Tone: normal.                  Strength:     [] Upper extremity                      Delt       Bicep    Tricep                                                  R         5/5        5/5        5/5       5/5                                               L          5/5        5/5        5/5       5/5  [] Lower extremity                       HF          KE          KF        DF         PF                                               R        5/5        5/5        5/5       5/5       5/5                                               L         5/5        5/5       5/5       5/5        5/5             Dysmetria: None to finger-nose-finger   No truncal ataxia.    Tremor: Postural and intention tremor present both upper extremities, course and high frequency    Sensation: Subjective numbness on left lateral calf, decreased sensation to LT and changes to temp on right lateral calf.     Deep Tendon Reflexes:     Biceps          Triceps      BR        Patellar        Ankle         Babinski                                  R       3+                   3+           2+            3+               2+           downgoing                                  L        3+                  3+           2+            3+               2+           downgoing    Gait: at baseline with walker.      Other:    -    143  |  100  |  18  ----------------------------<  137<H>  3.6   |  20<L>  |  1.01    Ca    9.2      2018 20:21    TPro  7.9  /  Alb  4.8  /  TBili  0.3  /  DBili  x   /  AST  18  /  ALT  13  /  AlkPhos  112  -                            15.2   13.2  )-----------( 290      ( 2018 20:21 )             44.1     Urinalysis Basic - ( 2018 20:37 )    Color: Yellow / Appearance: SL Turbid / S.026 / pH: x  Gluc: x / Ketone: Negative  / Bili: Negative / Urobili: Negative   Blood: x / Protein: 30 mg/dL / Nitrite: Negative   Leuk Esterase: Large / RBC: 5-10 /HPF / WBC 11-25 /HPF   Sq Epi: x / Non Sq Epi: OCC /HPF / Bacteria: Moderate /HPF        Radiology    MRI: < from: MRI Head w/wo Cont (17 @ 15:23) >  FINDINGS:   There are no new areas of T2/FLAIR signal hyperintensity, abnormal enhancement or area of restricted diffusion to suggest active disease.   Extensive areas of T2/FLAIR signal abnormality in the bi-hemispheric white matter are grossly unchanged. There is no acute infarct or hemorrhage. No   parenchymal mass effect or mass. No abnormal leptomeningeal enhancement.  There are no foci of abnormal susceptibility artifact within the brain   parenchyma or leptomeningeal space.    Cerebral volume loss with secondary proportional prominence of the ventricles and sulci is unchanged. Thinning of the corpus callosum is   relatively unchanged. There is no midline shift or abnormal extra-axial fluid collection.    < from: MRI Thoracic Spine w/wo Cont (17 @ 11:01) >  All discs demonstrate degenerative desiccation nucleus pulposus, some demonstrate mild loss of height. There is a moderate-sized   left paracentral disc extrusion at T3/T4 level with extruded material extending migrating along inferior aspect of T3 vertebral body. This   effaces the ventral thecal sac but does not impinge upon or compress the cord. There is no associated canal or foraminal stenosis.  There is a nonspecific focal area of aberrant intramedullary signal centrally in the cord at the T8 level demonstrating prolongation T1 and   T2 relaxation time. There is no associated contrast enhancement. The cord is neither enlarged nor atrophic at this level. Differential diagnosis   includes primary demyelinating process/multiple sclerosis, versus inflammatory/infectious etiology (viral, sarcoid, Lyme, etc.). Lack of   contrast enhancement would favor a chronic rather than acute process. The conus terminates at L1.    < from: MRI Cervical Spine w/wo Cont (17 @ 13:12) >  MRI BRAIN: Faint enhancement corresponding to left dorsal pontine signal abnormality identified on comparison MRI, which could represent active   demyelinating plaque in the setting of multiple sclerosis, infectious process, versus primary or metastatic neoplasm. No abnormal enhancement   corresponding to adjacent focal signal abnormality more anteriorly on lateral aspect of left elias. Interval resolution of abnormal enhancement   involving right middle cerebellar peduncle identified on comparison 3/4/2014 MRI.    Again seen are the diffuse white matter signal abnormalities, including at the septocallosal interface, with no corresponding enhancement, which   could represent chronic small vessel ischemic changes versus demyelinating, inflammatory or infectious process.    MRA BRAIN: Intracranial segments of both internal carotid arteries are patent. Attenuation of distal lateral MCA branches. Both M1 segments are   patent. Anterior cerebral arteries are patent. Vertebrobasilar junction and basilar artery are patent. Attenuation of bilateral distal posterior   cerebral artery branches. Possible tiny anterior communicating artery is present. Neither posterior communicating artery is visualized.    MRI CERVICAL SPINE: Motion limited study. Intrinsic T2 hyperintense cervical spine signal, at the level of C2 vertebral body, involving right   aspect of the spinal canal, measuring about 1.2 cm long. Tiny T2 hyperintense focus on the right aspect of the spinal cord at the level of   superior margin of C3 vertebral body. Questionable increased T2 signal of the cervical spine and the level of C4-C5. No corresponding abnormal cord   enhancement or enlargement to suggest cord edema. These findings are nonspecific, and could represent chronic demyelinating, inflammatory or   infectious process. Acute process is less likely as there is no evidence of accompanying edema or cord enhancement. CAROLINA Brown is a 68y old  Female who presents with a chief complaint of syncope    HPI:  69 y/o Right handed woman with hx of HTN, HLD, ? hx of afib previously on Xarelto, ? hx of strokes, Multiple sclerosis p/w episode of syncope earlier today. Pt says she was standing in the kitchen cooking when she had sudden onset of dizziness. She syncopized and fell to the ground. Family found her and she was slurring her speech and she vomited twice. She returned to baseline in a few minutes. She has these episodes of syncope or near syncope every so often. She was diagnosed with MS 2017, after she has symptoms of dizziness, tremor, headache, and blurred vision. MRI head extensive white matter disease and C/T spine with demyelinating lesions consistent with likely MS. Pt followed by       Dr. Fallon in [place] and is currently on Gilenya 0.5mg daily. She has had chronic tremors and walks with a walker since 2017.      MEDICATIONS  (STANDING): Gilenya , Aspirin, atorvastatin    MEDICATIONS  (PRN):    PAST MEDICAL & SURGICAL HISTORY:  Multiple Sclerosis  HLD (hyperlipidemia)  HTN (hypertension)  HTN (hypertension)  HLD (hyperlipidemia)    NO SIGNIFICANT PAST SURGICAL HISTORY: H/O: hysterectomy    FAMILY HISTORY:  No pertinent family history in first degree relatives    Allergies: No Known Allergies    Intolerances    SHx - Former smoker 50yrs, 75pack/yrs , No ETOH, No drug abuse    REVIEW OF SYSTEMS:    Constitutional: No fever, weight loss, or fatigue  Eyes: No eye pain, visual disturbances, or discharge  ENMT:  No difficulty hearing, tinnitus, vertigo; No sinus or throat pain  Neck: No pain or stiffness  Respiratory: No cough, wheezing, or shortness of breath  Cardiovascular: No chest pain, palpitations, or leg swelling  Gastrointestinal: No abdominal pain, nausea, vomiting, diarrhea or constipation.   Genitourinary: No dysuria, frequency, hematuria, or incontinence  Neurological: As per HPI  Skin: No itching, burning, rashes, or lesions   Endocrine: No heat or cold intolerance; No hair loss  Musculoskeletal: No joint pain or swelling; No muscle, back, or extremity pain  Psychiatric: No depression, anxiety, mood swings, or difficulty sleeping  Heme/Lymph: No easy bruising or bleeding; No enlarged glands      Vital Signs Last 24 Hrs  T(C): 36.7 (2018 20:02), Max: 36.8 (2018 18:31)  T(F): 98.1 (2018 20:02), Max: 98.2 (2018 18:31)  HR: 84 (2018 20:02) (68 - 84)  BP: 132/79 (2018 20:02) (132/79 - 142/76)  BP(mean): --  RR: 17 (2018 20:02) (16 - 17)  SpO2: 98% (2018 20:02) (98% - 100%)    Orthostatics: Laying flat- /75 HR 93; Sitting- /82 HR -;   Standing- /85 HR - (no dizziness noted with standing)    General Exam:   General appearance: No acute distress    Cardiac: RRR  Resp: breathing comfortably         Neurological Exam:  Mental Status: Orientated to self, date and place.  Attention intact.  No dysarthria. Speech fluent.  Cranial Nerves:   PERRL, EOMI, VFF, no nystagmus.    CN V1-3 intact to light touch .  No facial asymmetry.  Hearing intact bilaterally.  Tongue  midline.  Sternocleidomastoid and Trapezius intact bilaterally.    Motor:   Tone: normal.                  Strength:     [] Upper extremity                      Delt       Bicep    Tricep                                                  R         5/5        5/5        5/5       5/5                                               L          5/5        5/5        5/5       5/5  [] Lower extremity                       HF          KE          KF        DF         PF                                               R        5/5        5/5        5/5       5/5       5/5                                               L         5/5        5/5       5/5       5/5        5/5             Dysmetria: None to finger-nose-finger   No truncal ataxia.    Tremor: Postural and intention tremor present both upper extremities, course and high frequency    Sensation: Subjective numbness on left lateral calf, decreased sensation to LT and changes to temp on right lateral calf.     Deep Tendon Reflexes:     Biceps          Triceps      BR        Patellar        Ankle         Babinski                                  R       3+                   3+           2+            3+               2+           downgoing                                  L        3+                  3+           2+            3+               2+           downgoing    Gait: at baseline with walker.      Other:        143  |  100  |  18  ----------------------------<  137<H>  3.6   |  20<L>  |  1.01    Ca    9.2      2018 20:21    TPro  7.9  /  Alb  4.8  /  TBili  0.3  /  DBili  x   /  AST  18  /  ALT  13  /  AlkPhos  112  -08                            15.2   13.2  )-----------( 290      ( 2018 20:21 )             44.1     Urinalysis Basic - ( 2018 20:37 )    Color: Yellow / Appearance: SL Turbid / S.026 / pH: x  Gluc: x / Ketone: Negative  / Bili: Negative / Urobili: Negative   Blood: x / Protein: 30 mg/dL / Nitrite: Negative   Leuk Esterase: Large / RBC: 5-10 /HPF / WBC 11-25 /HPF   Sq Epi: x / Non Sq Epi: OCC /HPF / Bacteria: Moderate /HPF        Radiology    MRI: < from: MRI Head w/wo Cont (17 @ 15:23) >  FINDINGS:   There are no new areas of T2/FLAIR signal hyperintensity, abnormal enhancement or area of restricted diffusion to suggest active disease.   Extensive areas of T2/FLAIR signal abnormality in the bi-hemispheric white matter are grossly unchanged. There is no acute infarct or hemorrhage. No   parenchymal mass effect or mass. No abnormal leptomeningeal enhancement.  There are no foci of abnormal susceptibility artifact within the brain   parenchyma or leptomeningeal space.    Cerebral volume loss with secondary proportional prominence of the ventricles and sulci is unchanged. Thinning of the corpus callosum is   relatively unchanged. There is no midline shift or abnormal extra-axial fluid collection.    < from: MRI Thoracic Spine w/wo Cont (17 @ 11:01) >  All discs demonstrate degenerative desiccation nucleus pulposus, some demonstrate mild loss of height. There is a moderate-sized   left paracentral disc extrusion at T3/T4 level with extruded material extending migrating along inferior aspect of T3 vertebral body. This   effaces the ventral thecal sac but does not impinge upon or compress the cord. There is no associated canal or foraminal stenosis.  There is a nonspecific focal area of aberrant intramedullary signal centrally in the cord at the T8 level demonstrating prolongation T1 and   T2 relaxation time. There is no associated contrast enhancement. The cord is neither enlarged nor atrophic at this level. Differential diagnosis   includes primary demyelinating process/multiple sclerosis, versus inflammatory/infectious etiology (viral, sarcoid, Lyme, etc.). Lack of   contrast enhancement would favor a chronic rather than acute process. The conus terminates at L1.    < from: MRI Cervical Spine w/wo Cont (17 @ 13:12) >  MRI BRAIN: Faint enhancement corresponding to left dorsal pontine signal abnormality identified on comparison MRI, which could represent active   demyelinating plaque in the setting of multiple sclerosis, infectious process, versus primary or metastatic neoplasm. No abnormal enhancement   corresponding to adjacent focal signal abnormality more anteriorly on lateral aspect of left elias. Interval resolution of abnormal enhancement   involving right middle cerebellar peduncle identified on comparison 3/4/2014 MRI.    Again seen are the diffuse white matter signal abnormalities, including at the septocallosal interface, with no corresponding enhancement, which   could represent chronic small vessel ischemic changes versus demyelinating, inflammatory or infectious process.    MRA BRAIN: Intracranial segments of both internal carotid arteries are patent. Attenuation of distal lateral MCA branches. Both M1 segments are   patent. Anterior cerebral arteries are patent. Vertebrobasilar junction and basilar artery are patent. Attenuation of bilateral distal posterior   cerebral artery branches. Possible tiny anterior communicating artery is present. Neither posterior communicating artery is visualized.    MRI CERVICAL SPINE: Motion limited study. Intrinsic T2 hyperintense cervical spine signal, at the level of C2 vertebral body, involving right   aspect of the spinal canal, measuring about 1.2 cm long. Tiny T2 hyperintense focus on the right aspect of the spinal cord at the level of   superior margin of C3 vertebral body. Questionable increased T2 signal of the cervical spine and the level of C4-C5. No corresponding abnormal cord   enhancement or enlargement to suggest cord edema. These findings are nonspecific, and could represent chronic demyelinating, inflammatory or   infectious process. Acute process is less likely as there is no evidence of accompanying edema or cord enhancement. CAROLINA Brown is a 68y old  Female who presents with a chief complaint of syncope    HPI:  67 y/o Right handed woman with hx of HTN, HLD, ? hx of afib previously on Xarelto, ? hx of strokes, Multiple sclerosis p/w episode of syncope earlier today. Pt says she was standing in the kitchen cooking when she had sudden onset of dizziness. She syncopized and fell to the ground. Family found her and she was slurring her speech and she vomited twice. She returned to baseline in a few minutes. She has these episodes of syncope or near syncope every so often. She was diagnosed with MS 2017, after she has symptoms of dizziness, tremor, headache, and blurred vision. MRI head extensive white matter disease and C/T spine with demyelinating lesions consistent with likely MS. Pt followed by       Dr. Fallon in [place] and is currently on Gilenya 0.5mg daily. She has had chronic tremors and walks with a walker since 2017.      MEDICATIONS  (STANDING): Gilenya , Aspirin, atorvastatin    MEDICATIONS  (PRN):    PAST MEDICAL & SURGICAL HISTORY:  Multiple Sclerosis  HLD (hyperlipidemia)  HTN (hypertension)    NO SIGNIFICANT PAST SURGICAL HISTORY: H/O: hysterectomy    FAMILY HISTORY:  No pertinent family history in first degree relatives    Allergies: No Known Allergies    Intolerances    SHx - Former smoker 50yrs, 75pack/yrs , No ETOH, No drug abuse    REVIEW OF SYSTEMS:    Constitutional: No fever, weight loss, or fatigue  Eyes: No eye pain, visual disturbances, or discharge  ENMT:  No difficulty hearing, tinnitus, vertigo; No sinus or throat pain  Neck: No pain or stiffness  Respiratory: No cough, wheezing, or shortness of breath  Cardiovascular: No chest pain, palpitations, or leg swelling  Gastrointestinal: No abdominal pain, nausea, vomiting, diarrhea or constipation.   Genitourinary: No dysuria, frequency, hematuria, or incontinence  Neurological: As per HPI  Skin: No itching, burning, rashes, or lesions   Endocrine: No heat or cold intolerance; No hair loss  Musculoskeletal: No joint pain or swelling; No muscle, back, or extremity pain  Psychiatric: No depression, anxiety, mood swings, or difficulty sleeping  Heme/Lymph: No easy bruising or bleeding; No enlarged glands      Vital Signs Last 24 Hrs  T(C): 36.7 (2018 20:02), Max: 36.8 (2018 18:31)  T(F): 98.1 (2018 20:02), Max: 98.2 (2018 18:31)  HR: 84 (2018 20:02) (68 - 84)  BP: 132/79 (2018 20:02) (132/79 - 142/76)  BP(mean): --  RR: 17 (2018 20:02) (16 - 17)  SpO2: 98% (2018 20:02) (98% - 100%)    Orthostatics: Laying flat- /75 HR 93; Sitting- /82 HR -;   Standing- /85 HR - (no dizziness noted with standing)    General Exam:   General appearance: No acute distress    Cardiac: RRR  Resp: breathing comfortably         Neurological Exam:  Mental Status: Orientated to self, date and place.  Attention intact.  No dysarthria. Speech fluent.  Cranial Nerves:   PERRL, EOMI, VFF, no nystagmus.    CN V1-3 intact to light touch .  No facial asymmetry.  Hearing intact bilaterally.  Tongue  midline.  Sternocleidomastoid and Trapezius intact bilaterally.    Motor:   Tone: normal.                  Strength:     [] Upper extremity                      Delt       Bicep    Tricep                                                  R         5/5        5/5        5/5       5/5                                               L          5/5        5/5        5/5       5/5  [] Lower extremity                       HF          KE          KF        DF         PF                                               R        5/5        5/5        5/5       5/5       5/5                                               L         5/5        5/5       5/5       5/5        5/5             Dysmetria: None to finger-nose-finger   No truncal ataxia.    Tremor: Postural and intention tremor present both upper extremities, course and high frequency    Sensation: Subjective numbness on left lateral calf, decreased sensation to LT and changes to temp on right lateral calf.     Deep Tendon Reflexes:     Biceps          Triceps      BR        Patellar        Ankle         Babinski                                  R       3+                   3+           2+            3+               2+           downgoing                                  L        3+                  3+           2+            3+               2+           downgoing    Gait: at baseline with walker.      Other:    -08    143  |  100  |  18  ----------------------------<  137<H>  3.6   |  20<L>  |  1.01    Ca    9.2      2018 20:21    TPro  7.9  /  Alb  4.8  /  TBili  0.3  /  DBili  x   /  AST  18  /  ALT  13  /  AlkPhos  112  -08                            15.2   13.2  )-----------( 290      ( 2018 20:21 )             44.1     Urinalysis Basic - ( 2018 20:37 )    Color: Yellow / Appearance: SL Turbid / S.026 / pH: x  Gluc: x / Ketone: Negative  / Bili: Negative / Urobili: Negative   Blood: x / Protein: 30 mg/dL / Nitrite: Negative   Leuk Esterase: Large / RBC: 5-10 /HPF / WBC 11-25 /HPF   Sq Epi: x / Non Sq Epi: OCC /HPF / Bacteria: Moderate /HPF        Radiology    MRI: < from: MRI Head w/wo Cont (17 @ 15:23) >  FINDINGS:   There are no new areas of T2/FLAIR signal hyperintensity, abnormal enhancement or area of restricted diffusion to suggest active disease.   Extensive areas of T2/FLAIR signal abnormality in the bi-hemispheric white matter are grossly unchanged. There is no acute infarct or hemorrhage. No   parenchymal mass effect or mass. No abnormal leptomeningeal enhancement.  There are no foci of abnormal susceptibility artifact within the brain   parenchyma or leptomeningeal space.    Cerebral volume loss with secondary proportional prominence of the ventricles and sulci is unchanged. Thinning of the corpus callosum is   relatively unchanged. There is no midline shift or abnormal extra-axial fluid collection.    < from: MRI Thoracic Spine w/wo Cont (17 @ 11:01) >  All discs demonstrate degenerative desiccation nucleus pulposus, some demonstrate mild loss of height. There is a moderate-sized   left paracentral disc extrusion at T3/T4 level with extruded material extending migrating along inferior aspect of T3 vertebral body. This   effaces the ventral thecal sac but does not impinge upon or compress the cord. There is no associated canal or foraminal stenosis.  There is a nonspecific focal area of aberrant intramedullary signal centrally in the cord at the T8 level demonstrating prolongation T1 and   T2 relaxation time. There is no associated contrast enhancement. The cord is neither enlarged nor atrophic at this level. Differential diagnosis   includes primary demyelinating process/multiple sclerosis, versus inflammatory/infectious etiology (viral, sarcoid, Lyme, etc.). Lack of   contrast enhancement would favor a chronic rather than acute process. The conus terminates at L1.    < from: MRI Cervical Spine w/wo Cont (17 @ 13:12) >  MRI BRAIN: Faint enhancement corresponding to left dorsal pontine signal abnormality identified on comparison MRI, which could represent active   demyelinating plaque in the setting of multiple sclerosis, infectious process, versus primary or metastatic neoplasm. No abnormal enhancement   corresponding to adjacent focal signal abnormality more anteriorly on lateral aspect of left elias. Interval resolution of abnormal enhancement   involving right middle cerebellar peduncle identified on comparison 3/4/2014 MRI.    Again seen are the diffuse white matter signal abnormalities, including at the septocallosal interface, with no corresponding enhancement, which   could represent chronic small vessel ischemic changes versus demyelinating, inflammatory or infectious process.    MRA BRAIN: Intracranial segments of both internal carotid arteries are patent. Attenuation of distal lateral MCA branches. Both M1 segments are   patent. Anterior cerebral arteries are patent. Vertebrobasilar junction and basilar artery are patent. Attenuation of bilateral distal posterior   cerebral artery branches. Possible tiny anterior communicating artery is present. Neither posterior communicating artery is visualized.    MRI CERVICAL SPINE: Motion limited study. Intrinsic T2 hyperintense cervical spine signal, at the level of C2 vertebral body, involving right   aspect of the spinal canal, measuring about 1.2 cm long. Tiny T2 hyperintense focus on the right aspect of the spinal cord at the level of   superior margin of C3 vertebral body. Questionable increased T2 signal of the cervical spine and the level of C4-C5. No corresponding abnormal cord   enhancement or enlargement to suggest cord edema. These findings are nonspecific, and could represent chronic demyelinating, inflammatory or   infectious process. Acute process is less likely as there is no evidence of accompanying edema or cord enhancement.

## 2018-06-08 NOTE — ED ADULT NURSE NOTE - OBJECTIVE STATEMENT
68y female with hx of MD, HTN, HLD presents to the ER s/p syncopal episode. pt is alert and oriented x 4 and speaking coherently, pt has slight delay in response to questions- daughter states this is her mothers baseline. pt states she was at home frosting cupcakes when she felt herself feel dizzy. she states she made it to the couch and then syncopized when lying on the couch. unknown loc length. pt remember someone speaking to her after the episode, pt states she then vomited after syncopizing. pt states she has had LOC in the past but has never vomited. Pt denies cp, sob, n/v/d. Pt moving all extremities. pt only states she feels generalized body weakness. pt resting comfortably, bed low and locked. will reassess.

## 2018-06-08 NOTE — ED PROVIDER NOTE - OBJECTIVE STATEMENT
69 yo F hx of MS on Delinea bib daughter sp syncope this evening now at baseline. Pt states she has had several days of generalized weakness. This afternoon was standing and baking cupcakes when she began to feel weak and unsteady. Walked to cough and does not recall afterwards. Her family attempted to arouse her and she was not answering questions. no focal seizure like activity. Lasted seconds to minutes with prompt return to baseline. States she has had several syncope and near-syncope which precede her MS flares. No new focal numbness or weakness, no vision changes, no sob, or chest pain, no palpitations, no hx of irregular hb.   Pt ambualtes with walker at baseline. Speech, affect, appearance, mental status at baseline per daughter at bedside.

## 2018-06-08 NOTE — CONSULT NOTE ADULT - ASSESSMENT
69 y/o woman, former smoker with 75p/yr, PMHx of HTN, HLD, ?Afib previously on Xarelto, Multiple sclerosis dx Jan 2017 on Gilenya, p/w episode of syncope, dizziness, vomiting at home, with hx of multiple previous episodes. Orthostatic vital signs negative for orthostatic hypotension. On neuro exam has mild action tremor, patchy sensory changes over Right calf, and walking at baseline with walker. Labwork significant for leukocytosis and abnormal urinalysis suggestive of urinary tract infection. Previous imaging showed several white matter changes in the periventricular white matter, brainstem, and spina cord that were highly indicative of demyelinating disease, but no active lesions.     Impression: No new neurological findings suggestive of acute MS flair. Infection can worsen MS symptoms.     Recommendation:    -cardiology w/u for syncope  - MRI brain w/wo contrast so see progression of disease or active demyelinating plaques.  - Would hold off starting any steroids until evidence of active disease found.  - C/w Gilenya while admitted.  - neuro checks q4hrs

## 2018-06-09 ENCOUNTER — TRANSCRIPTION ENCOUNTER (OUTPATIENT)
Age: 68
End: 2018-06-09

## 2018-06-09 DIAGNOSIS — I63.9 CEREBRAL INFARCTION, UNSPECIFIED: ICD-10-CM

## 2018-06-09 DIAGNOSIS — R82.90 UNSPECIFIED ABNORMAL FINDINGS IN URINE: ICD-10-CM

## 2018-06-09 DIAGNOSIS — I10 ESSENTIAL (PRIMARY) HYPERTENSION: ICD-10-CM

## 2018-06-09 DIAGNOSIS — R55 SYNCOPE AND COLLAPSE: ICD-10-CM

## 2018-06-09 DIAGNOSIS — E78.5 HYPERLIPIDEMIA, UNSPECIFIED: ICD-10-CM

## 2018-06-09 DIAGNOSIS — Z29.9 ENCOUNTER FOR PROPHYLACTIC MEASURES, UNSPECIFIED: ICD-10-CM

## 2018-06-09 DIAGNOSIS — G35 MULTIPLE SCLEROSIS: ICD-10-CM

## 2018-06-09 LAB
ALBUMIN SERPL ELPH-MCNC: 4 G/DL — SIGNIFICANT CHANGE UP (ref 3.3–5)
ALP SERPL-CCNC: 93 U/L — SIGNIFICANT CHANGE UP (ref 40–120)
ALT FLD-CCNC: 10 U/L — SIGNIFICANT CHANGE UP (ref 10–45)
ANION GAP SERPL CALC-SCNC: 15 MMOL/L — SIGNIFICANT CHANGE UP (ref 5–17)
AST SERPL-CCNC: 14 U/L — SIGNIFICANT CHANGE UP (ref 10–40)
BASOPHILS # BLD AUTO: 0.01 K/UL — SIGNIFICANT CHANGE UP (ref 0–0.2)
BASOPHILS NFR BLD AUTO: 0.1 % — SIGNIFICANT CHANGE UP (ref 0–2)
BILIRUB SERPL-MCNC: 0.5 MG/DL — SIGNIFICANT CHANGE UP (ref 0.2–1.2)
BUN SERPL-MCNC: 14 MG/DL — SIGNIFICANT CHANGE UP (ref 7–23)
CALCIUM SERPL-MCNC: 8.8 MG/DL — SIGNIFICANT CHANGE UP (ref 8.4–10.5)
CHLORIDE SERPL-SCNC: 101 MMOL/L — SIGNIFICANT CHANGE UP (ref 96–108)
CO2 SERPL-SCNC: 22 MMOL/L — SIGNIFICANT CHANGE UP (ref 22–31)
CREAT SERPL-MCNC: 0.78 MG/DL — SIGNIFICANT CHANGE UP (ref 0.5–1.3)
CULTURE RESULTS: SIGNIFICANT CHANGE UP
EOSINOPHIL # BLD AUTO: 0.05 K/UL — SIGNIFICANT CHANGE UP (ref 0–0.5)
EOSINOPHIL NFR BLD AUTO: 0.7 % — SIGNIFICANT CHANGE UP (ref 0–6)
GLUCOSE SERPL-MCNC: 84 MG/DL — SIGNIFICANT CHANGE UP (ref 70–99)
HCT VFR BLD CALC: 39.6 % — SIGNIFICANT CHANGE UP (ref 34.5–45)
HGB BLD-MCNC: 12.7 G/DL — SIGNIFICANT CHANGE UP (ref 11.5–15.5)
IMM GRANULOCYTES NFR BLD AUTO: 0.1 % — SIGNIFICANT CHANGE UP (ref 0–1.5)
LYMPHOCYTES # BLD AUTO: 0.67 K/UL — LOW (ref 1–3.3)
LYMPHOCYTES # BLD AUTO: 9.4 % — LOW (ref 13–44)
MAGNESIUM SERPL-MCNC: 2 MG/DL — SIGNIFICANT CHANGE UP (ref 1.6–2.6)
MCHC RBC-ENTMCNC: 31.6 PG — SIGNIFICANT CHANGE UP (ref 27–34)
MCHC RBC-ENTMCNC: 32.1 GM/DL — SIGNIFICANT CHANGE UP (ref 32–36)
MCV RBC AUTO: 98.5 FL — SIGNIFICANT CHANGE UP (ref 80–100)
MONOCYTES # BLD AUTO: 0.63 K/UL — SIGNIFICANT CHANGE UP (ref 0–0.9)
MONOCYTES NFR BLD AUTO: 8.8 % — SIGNIFICANT CHANGE UP (ref 2–14)
NEUTROPHILS # BLD AUTO: 5.77 K/UL — SIGNIFICANT CHANGE UP (ref 1.8–7.4)
NEUTROPHILS NFR BLD AUTO: 80.9 % — HIGH (ref 43–77)
PHOSPHATE SERPL-MCNC: 3.2 MG/DL — SIGNIFICANT CHANGE UP (ref 2.5–4.5)
PLATELET # BLD AUTO: 258 K/UL — SIGNIFICANT CHANGE UP (ref 150–400)
POTASSIUM SERPL-MCNC: 3.7 MMOL/L — SIGNIFICANT CHANGE UP (ref 3.5–5.3)
POTASSIUM SERPL-SCNC: 3.7 MMOL/L — SIGNIFICANT CHANGE UP (ref 3.5–5.3)
PROT SERPL-MCNC: 6.8 G/DL — SIGNIFICANT CHANGE UP (ref 6–8.3)
RBC # BLD: 4.02 M/UL — SIGNIFICANT CHANGE UP (ref 3.8–5.2)
RBC # FLD: 12.4 % — SIGNIFICANT CHANGE UP (ref 10.3–14.5)
SODIUM SERPL-SCNC: 138 MMOL/L — SIGNIFICANT CHANGE UP (ref 135–145)
SPECIMEN SOURCE: SIGNIFICANT CHANGE UP
TSH SERPL-MCNC: 1.55 UIU/ML — SIGNIFICANT CHANGE UP (ref 0.27–4.2)
WBC # BLD: 7.14 K/UL — SIGNIFICANT CHANGE UP (ref 3.8–10.5)
WBC # FLD AUTO: 7.14 K/UL — SIGNIFICANT CHANGE UP (ref 3.8–10.5)

## 2018-06-09 PROCEDURE — 99223 1ST HOSP IP/OBS HIGH 75: CPT

## 2018-06-09 PROCEDURE — 12345: CPT | Mod: NC

## 2018-06-09 PROCEDURE — 93010 ELECTROCARDIOGRAM REPORT: CPT

## 2018-06-09 RX ORDER — ENOXAPARIN SODIUM 100 MG/ML
40 INJECTION SUBCUTANEOUS EVERY 24 HOURS
Qty: 0 | Refills: 0 | Status: DISCONTINUED | OUTPATIENT
Start: 2018-06-09 | End: 2018-06-11

## 2018-06-09 RX ORDER — ASPIRIN/CALCIUM CARB/MAGNESIUM 324 MG
81 TABLET ORAL DAILY
Qty: 0 | Refills: 0 | Status: DISCONTINUED | OUTPATIENT
Start: 2018-06-09 | End: 2018-06-11

## 2018-06-09 RX ORDER — FINGOLIMOD HCL 0.5 MG/1
0.5 CAPSULE ORAL DAILY
Qty: 0 | Refills: 0 | Status: DISCONTINUED | OUTPATIENT
Start: 2018-06-09 | End: 2018-06-11

## 2018-06-09 RX ORDER — CEFTRIAXONE 500 MG/1
1 INJECTION, POWDER, FOR SOLUTION INTRAMUSCULAR; INTRAVENOUS EVERY 24 HOURS
Qty: 0 | Refills: 0 | Status: DISCONTINUED | OUTPATIENT
Start: 2018-06-09 | End: 2018-06-11

## 2018-06-09 RX ORDER — ATORVASTATIN CALCIUM 80 MG/1
40 TABLET, FILM COATED ORAL AT BEDTIME
Qty: 0 | Refills: 0 | Status: DISCONTINUED | OUTPATIENT
Start: 2018-06-09 | End: 2018-06-11

## 2018-06-09 RX ADMIN — FINGOLIMOD HCL 0.5 MILLIGRAM(S): 0.5 CAPSULE ORAL at 16:06

## 2018-06-09 RX ADMIN — ENOXAPARIN SODIUM 40 MILLIGRAM(S): 100 INJECTION SUBCUTANEOUS at 05:46

## 2018-06-09 RX ADMIN — ATORVASTATIN CALCIUM 40 MILLIGRAM(S): 80 TABLET, FILM COATED ORAL at 21:16

## 2018-06-09 RX ADMIN — Medication 81 MILLIGRAM(S): at 12:03

## 2018-06-09 RX ADMIN — CEFTRIAXONE 100 GRAM(S): 500 INJECTION, POWDER, FOR SOLUTION INTRAMUSCULAR; INTRAVENOUS at 21:16

## 2018-06-09 NOTE — DISCHARGE NOTE ADULT - PATIENT PORTAL LINK FT
You can access the [facility] Patient Portal, offered by [facility], by registering with the following website: [email or web address]

## 2018-06-09 NOTE — H&P ADULT - NSHPREVIEWOFSYSTEMS_GEN_ALL_CORE
CONSTITUTIONAL: No weakness, fevers or chills  EYES/ENT: blurry vision, not new  NECK: No pain or stiffness  RESPIRATORY: No cough, wheezing, hemoptysis; No shortness of breath  CARDIOVASCULAR: No chest pain or palpitations; No lower extremity edema  EXTREMITIES: no le edema, cyanosis, clubbing  MUSCULOSKELETAL: weakness of b/l LE  GASTROINTESTINAL: No abdominal or epigastric pain. No nausea, vomiting, or hematemesis; No diarrhea or constipation. No melena or hematochezia.  BACK: No back pain  GENITOURINARY: No dysuria, frequency or hematuria  NEUROLOGICAL: +numbness b/l LE occasionally  SKIN: No itching, burning, rashes, or lesions   PSYCH: no agitation  All other review of systems is negative unless indicated above.

## 2018-06-09 NOTE — CHART NOTE - NSCHARTNOTEFT_GEN_A_CORE
Pt seen and examined at bedside.  67 yo female with hx of MS presented with syncopal episode.  Pt has had these episodes in the past with echo and duplex of the carotids done last year without evidence of valvular disease or significant carotid disease.  Pt evaluated by neuro, rec MR to r/o MS flare.  F/u MR head, and repeat echo.  Continue with tele monitoring.  Rest of plan as outlined in H&P.

## 2018-06-09 NOTE — H&P ADULT - PROBLEM SELECTOR PLAN 4
c/w aspirin and lipitor c/w lipitor not on medication at home  continue to monitor  confirm home meds in AM

## 2018-06-09 NOTE — H&P ADULT - ASSESSMENT
69 yo female with PMH of MS on Gilenya, HTN, HLD, ?CVA, ?Afib now presents here with possible syncope.

## 2018-06-09 NOTE — H&P ADULT - NSHPPHYSICALEXAM_GEN_ALL_CORE
PHYSICAL EXAM:  Vital Signs Last 24 Hrs  T(C): 36.9 (06-09-18 @ 00:31)  T(F): 98.4 (06-09-18 @ 00:31), Max: 98.4 (06-09-18 @ 00:31)  HR: 76 (06-09-18 @ 00:31) (68 - 84)  BP: 150/82 (06-09-18 @ 00:31)  BP(mean): --  RR: 18 (06-09-18 @ 00:31) (16 - 18)  SpO2: 95% (06-09-18 @ 00:31) (95% - 100%)  Wt(kg): --    Constitutional: NAD, awake and alert  EYES: EOMI  ENT:  Normal Hearing, no tonsillar exudates   Neck: Soft and supple, No JVD  Lungs: Breath sounds are clear bilaterally, No wheezing, rales or rhonchi  Heart: S1 and S2, regular rate and rhythm, no Murmurs, gallops or rubs  Abdomen: Bowel Sounds present, soft, nontender, nondistended, no guarding, no rebound  Extremities: No cyanosis or clubbing; warm to touch  Vascular: 2+ peripheral pulses lower ex  Neurological: A/O x 3, no focal deficits; +tremors of the hands; finger to nose intact  Musculoskeletal: 5/5 strength b/l upper extremities; 4/5 b/l lower extremities  Skin: No rashes  Psych: no depression or anhedonia  HEME: no bruises, no nose bleeds PHYSICAL EXAM:  Vital Signs Last 24 Hrs  T(C): 36.9 (06-09-18 @ 00:31)  T(F): 98.4 (06-09-18 @ 00:31), Max: 98.4 (06-09-18 @ 00:31)  HR: 76 (06-09-18 @ 00:31) (68 - 84)  BP: 150/82 (06-09-18 @ 00:31)  BP(mean): --  RR: 18 (06-09-18 @ 00:31) (16 - 18)  SpO2: 95% (06-09-18 @ 00:31) (95% - 100%)  Wt(kg): --    Constitutional: NAD, awake and alert  EYES: EOMI; no nystagmus  ENT:  Normal Hearing, no tonsillar exudates   Neck: Soft and supple, No JVD  Lungs: Breath sounds are clear bilaterally, No wheezing, rales or rhonchi  Heart: S1 and S2, regular rate and rhythm, no Murmurs, gallops or rubs  Abdomen: Bowel Sounds present, soft, nontender, nondistended, no guarding, no rebound  Extremities: No cyanosis or clubbing; warm to touch  Vascular: 2+ peripheral pulses lower ex  Neurological: A/O x 3, no focal deficits; +tremors of the hands; finger to nose intact  Musculoskeletal: 5/5 strength b/l upper extremities; 4/5 b/l lower extremities  Skin: No rashes  Psych: no depression or anhedonia  HEME: no bruises, no nose bleeds

## 2018-06-09 NOTE — DISCHARGE NOTE ADULT - CARE PLAN
Principal Discharge DX:	Syncope, unspecified syncope type  Goal:	prevention  Assessment and plan of treatment:	You had extensive neuro w/u including CT/MRI without acute findings.   Please follow up with Dr. Fallon and Dr. Pappas in 1-2 weeks.   Ensure you are adequately hydrated. AVoid sudden changes in position.  Secondary Diagnosis:	Multiple sclerosis  Assessment and plan of treatment:	Cont with current treatment.   Follow up with Dr. Fallon  Secondary Diagnosis:	Essential hypertension  Assessment and plan of treatment:	follow up with Dr. Pappas for BP check

## 2018-06-09 NOTE — DISCHARGE NOTE ADULT - CARE PROVIDER_API CALL
Anders Pappas), Medicine  [address] [place] [address] [address]  [place] [address]  Phone: [phone]  Fax: [phone]    Humphrey Fallon (PIPPA), Neurology  [address] [place] [address]  Phone: [phone]  Fax: [phone]

## 2018-06-09 NOTE — H&P ADULT - NSHPLABSRESULTS_GEN_ALL_CORE
Labs personally reviewed:                          15.2   13.2  )-----------( 290      ( 2018 20:21 )             44.1     06-    143  |  100  |  18  ----------------------------<  137<H>  3.6   |  20<L>  |  1.01    Ca    9.2      2018 20:21    TPro  7.9  /  Alb  4.8  /  TBili  0.3  /  DBili  x   /  AST  18  /  ALT  13  /  AlkPhos  112  06-08    CARDIAC MARKERS ( 2018 20:21 )  x     / <0.01 ng/mL / x     / x     / x          LIVER FUNCTIONS - ( 2018 20:21 )  Alb: 4.8 g/dL / Pro: 7.9 g/dL / ALK PHOS: 112 U/L / ALT: 13 U/L / AST: 18 U/L / GGT: x             Urinalysis Basic - ( 2018 20:37 )    Color: Yellow / Appearance: SL Turbid / S.026 / pH: x  Gluc: x / Ketone: Negative  / Bili: Negative / Urobili: Negative   Blood: x / Protein: 30 mg/dL / Nitrite: Negative   Leuk Esterase: Large / RBC: 5-10 /HPF / WBC 11-25 /HPF   Sq Epi: x / Non Sq Epi: OCC /HPF / Bacteria: Moderate /HPF      CAPILLARY BLOOD GLUCOSE          Imaging:  MR head ordered    EKG done in ED, could not be found; will reorder

## 2018-06-09 NOTE — DISCHARGE NOTE ADULT - HOSPITAL COURSE
69 yo female with PMH of MS on Gilenya, HTN, HLD, CVA, now presents here with syncope. Patient was working in the kitchen on her way to the living room when per family she collapse and unresponsive. Patient was brought to hospital for eval and was seen by neuro and med. CT head was neg. MRI was performed showing unchanged demyelinating dz compared to last year. Patient was treated on IV abx for suspected UTI. echo was performed. PT saw patient.

## 2018-06-09 NOTE — DISCHARGE NOTE ADULT - MEDICATION SUMMARY - MEDICATIONS TO TAKE
I will START or STAY ON the medications listed below when I get home from the hospital:    Aspir 81 oral delayed release tablet  -- 1 tab(s) by mouth once a day  -- Indication: For CVA (cerebral vascular accident)    atorvastatin 40 mg oral tablet  -- 1 tab(s) by mouth once a day (at bedtime)    Note: patient is noncompliant; per pharmacy last filled 12/2017    -- Indication: For HLD (hyperlipidemia)    Gilenya 0.5 mg oral capsule  -- 1 cap(s) by mouth once a day  -- Indication: For Multiple sclerosis I will START or STAY ON the medications listed below when I get home from the hospital:    Aspir 81 oral delayed release tablet  -- 1 tab(s) by mouth once a day  -- Indication: For CAD    atorvastatin 40 mg oral tablet  -- 1 tab(s) by mouth once a day (at bedtime)    Note: patient is noncompliant; per pharmacy last filled 12/2017    -- Indication: For HLD (hyperlipidemia)    Gilenya 0.5 mg oral capsule  -- 1 cap(s) by mouth once a day  -- Indication: For MS

## 2018-06-09 NOTE — H&P ADULT - PROBLEM SELECTOR PLAN 3
c/w lipitor not on medication at home  continue to monitor  confirm home meds in AM on Gilenya; non formulary;   will confirm meds in AM and request family to bring medication  currently does not seem have flare ups  will hold off steroids  check MR head to assess for disease progression

## 2018-06-09 NOTE — DISCHARGE NOTE ADULT - PLAN OF CARE
prevention You had extensive neuro w/u including CT/MRI without acute findings.   Please follow up with Dr. Fallon and Dr. Pappas in 1-2 weeks.   Ensure you are adequately hydrated. AVoid sudden changes in position. Cont with current treatment.   Follow up with Dr. Fallon follow up with Dr. Pappas for BP check

## 2018-06-09 NOTE — H&P ADULT - HISTORY OF PRESENT ILLNESS
69 y/o Right handed woman with hx of HTN, HLD, ? hx of afib previously on Xarelto, ? hx of strokes, Multiple sclerosis p/w episode of syncope earlier today. Pt says she was standing in the kitchen cooking when she had sudden onset of dizziness. She syncopized and fell to the ground. Family found her and she was slurring her speech and she vomited twice. She returned to baseline in a few minutes. She has these episodes of syncope or near syncope every so often. She was diagnosed with MS Jan 2017, after she has symptoms of dizziness, tremor, headache, and blurred vision. MRI head extensive white matter disease and C/T spine with demyelinating lesions consistent with likely MS. Pt followed by       Dr. Fallon in [place] and is currently on Gilenya 0.5mg daily. She has had chronic tremors and walks with a walker since January 2017. 67 yo female with PMH of MS on Gilenya, HTN, HLD, ?CVA, ?Afib now presents here with possible syncope.  Patient's family member at [phone] could not be reached, therefore history was obtained from patient.    Patient states that she was standing in the kitchen, making cupcakes and suddenly felt dizzy.  She then walked to the living room, but next thing she remembers was that her daughter in law and her granddaughter was calling her name to wake her up.  Although she was able to hear them, she could not answer them.  She was told by her granddaughter that after coming to the living room, she fell on the couch.  There was no head trauma, no urinary or bowel incontinence and no seizure activities.  After waking up in 2 minutes, she was still feeling dizzy and vomited twice.  Her daughter then brought her to hospital and on the way to hospital, patient threw up again.  Patient otherwise denies any chest pain, SOB, cough, runny nose, fever, dysuria, abdominal pain.  Patient does state that she had occasional dizziness in the past.  She has some baseline tremors and blurry vision as well.  At baseline, she walks with a walker.  She had MS flare in January and at that time she had weakness of legs.  Patient states that today's symptoms were not similar to previous MS flare. 69 yo female with PMH of MS on Gilenya, HTN, HLD, ?CVA, ?Afib now presents here with possible syncope.  Patient's family member at [phone] could not be reached, history was obtained from patient.    Patient states that she was standing in the kitchen, making cupcakes and suddenly felt dizzy.  She then walked to the living room, but next thing she remembers was that her daughter in law and her granddaughter was calling her name to wake her up.  Although she was able to hear them, she could not answer them.  She was told by her granddaughter that after coming to the living room, she fell on the couch.  There was no head trauma, no urinary or bowel incontinence and no seizure activities.  After waking up in 2 minutes, she was still feeling dizzy and vomited twice.  Her daughter then brought her to hospital and on the way to hospital, patient threw up again.  Patient otherwise denies any chest pain, SOB, cough, runny nose, fever, dysuria, abdominal pain.  Patient does state that she had occasional dizziness in the past.  She has some baseline tremors and blurry vision as well.  At baseline, she walks with a walker.  She had MS flare in January and at that time she had weakness of legs.  Patient states that today's symptoms were not similar to previous MS flare.

## 2018-06-09 NOTE — H&P ADULT - PROBLEM SELECTOR PLAN 1
Patient with syncopal episode, unlikely to be due to MS flare  possible infectious cause ?UTI  orthostatics negative  will get echcardiogram  monitor in telemetry  MR brain ordered per neurology request  check TSH

## 2018-06-09 NOTE — H&P ADULT - PROBLEM SELECTOR PLAN 6
lovenox on Gilenya; non formulary;   will confirm meds in AM and request family to bring medication  currently does not seem have flare ups  will hold off steroids  check MR head to assess for disease progression ?hx of CVA  c/w aspirin and lipitor

## 2018-06-09 NOTE — H&P ADULT - PROBLEM SELECTOR PLAN 2
not on medication at home  continue to monitor  confirm home meds in AM Patient does not have symptoms  s/p IV ceftriaxone in ED;  will continue with treatment for now with IV ceftriaxone

## 2018-06-10 LAB
ANION GAP SERPL CALC-SCNC: 13 MMOL/L — SIGNIFICANT CHANGE UP (ref 5–17)
BUN SERPL-MCNC: 15 MG/DL — SIGNIFICANT CHANGE UP (ref 7–23)
CALCIUM SERPL-MCNC: 9.3 MG/DL — SIGNIFICANT CHANGE UP (ref 8.4–10.5)
CHLORIDE SERPL-SCNC: 104 MMOL/L — SIGNIFICANT CHANGE UP (ref 96–108)
CO2 SERPL-SCNC: 24 MMOL/L — SIGNIFICANT CHANGE UP (ref 22–31)
CREAT SERPL-MCNC: 0.98 MG/DL — SIGNIFICANT CHANGE UP (ref 0.5–1.3)
GLUCOSE SERPL-MCNC: 95 MG/DL — SIGNIFICANT CHANGE UP (ref 70–99)
HCT VFR BLD CALC: 41 % — SIGNIFICANT CHANGE UP (ref 34.5–45)
HGB BLD-MCNC: 13.5 G/DL — SIGNIFICANT CHANGE UP (ref 11.5–15.5)
MCHC RBC-ENTMCNC: 32.5 PG — SIGNIFICANT CHANGE UP (ref 27–34)
MCHC RBC-ENTMCNC: 32.9 GM/DL — SIGNIFICANT CHANGE UP (ref 32–36)
MCV RBC AUTO: 98.8 FL — SIGNIFICANT CHANGE UP (ref 80–100)
PLATELET # BLD AUTO: 266 K/UL — SIGNIFICANT CHANGE UP (ref 150–400)
POTASSIUM SERPL-MCNC: 4.1 MMOL/L — SIGNIFICANT CHANGE UP (ref 3.5–5.3)
POTASSIUM SERPL-SCNC: 4.1 MMOL/L — SIGNIFICANT CHANGE UP (ref 3.5–5.3)
RBC # BLD: 4.15 M/UL — SIGNIFICANT CHANGE UP (ref 3.8–5.2)
RBC # FLD: 12.1 % — SIGNIFICANT CHANGE UP (ref 10.3–14.5)
SODIUM SERPL-SCNC: 141 MMOL/L — SIGNIFICANT CHANGE UP (ref 135–145)
WBC # BLD: 4.85 K/UL — SIGNIFICANT CHANGE UP (ref 3.8–10.5)
WBC # FLD AUTO: 4.85 K/UL — SIGNIFICANT CHANGE UP (ref 3.8–10.5)

## 2018-06-10 PROCEDURE — 99233 SBSQ HOSP IP/OBS HIGH 50: CPT

## 2018-06-10 PROCEDURE — 70553 MRI BRAIN STEM W/O & W/DYE: CPT | Mod: 26

## 2018-06-10 RX ADMIN — Medication 81 MILLIGRAM(S): at 15:00

## 2018-06-10 RX ADMIN — FINGOLIMOD HCL 0.5 MILLIGRAM(S): 0.5 CAPSULE ORAL at 15:00

## 2018-06-10 RX ADMIN — ENOXAPARIN SODIUM 40 MILLIGRAM(S): 100 INJECTION SUBCUTANEOUS at 05:13

## 2018-06-10 RX ADMIN — ATORVASTATIN CALCIUM 40 MILLIGRAM(S): 80 TABLET, FILM COATED ORAL at 22:35

## 2018-06-10 RX ADMIN — CEFTRIAXONE 100 GRAM(S): 500 INJECTION, POWDER, FOR SOLUTION INTRAMUSCULAR; INTRAVENOUS at 22:35

## 2018-06-10 NOTE — PROVIDER CONTACT NOTE (OTHER) - BACKGROUND
Pt admitted for syncope and collapse. Pt has hx of HTN and MS currently taking Gilenya and off antihypertensives . Pt's bp has been predominantly >150 sys since admission.

## 2018-06-11 VITALS — HEART RATE: 71 BPM | DIASTOLIC BLOOD PRESSURE: 80 MMHG | SYSTOLIC BLOOD PRESSURE: 143 MMHG

## 2018-06-11 LAB
ANION GAP SERPL CALC-SCNC: 15 MMOL/L — SIGNIFICANT CHANGE UP (ref 5–17)
BUN SERPL-MCNC: 19 MG/DL — SIGNIFICANT CHANGE UP (ref 7–23)
CALCIUM SERPL-MCNC: 9 MG/DL — SIGNIFICANT CHANGE UP (ref 8.4–10.5)
CHLORIDE SERPL-SCNC: 103 MMOL/L — SIGNIFICANT CHANGE UP (ref 96–108)
CO2 SERPL-SCNC: 22 MMOL/L — SIGNIFICANT CHANGE UP (ref 22–31)
CREAT SERPL-MCNC: 0.87 MG/DL — SIGNIFICANT CHANGE UP (ref 0.5–1.3)
GLUCOSE SERPL-MCNC: 93 MG/DL — SIGNIFICANT CHANGE UP (ref 70–99)
HCT VFR BLD CALC: 39.4 % — SIGNIFICANT CHANGE UP (ref 34.5–45)
HGB BLD-MCNC: 12.6 G/DL — SIGNIFICANT CHANGE UP (ref 11.5–15.5)
MCHC RBC-ENTMCNC: 31.3 PG — SIGNIFICANT CHANGE UP (ref 27–34)
MCHC RBC-ENTMCNC: 32 GM/DL — SIGNIFICANT CHANGE UP (ref 32–36)
MCV RBC AUTO: 97.8 FL — SIGNIFICANT CHANGE UP (ref 80–100)
PLATELET # BLD AUTO: 259 K/UL — SIGNIFICANT CHANGE UP (ref 150–400)
POTASSIUM SERPL-MCNC: 3.7 MMOL/L — SIGNIFICANT CHANGE UP (ref 3.5–5.3)
POTASSIUM SERPL-SCNC: 3.7 MMOL/L — SIGNIFICANT CHANGE UP (ref 3.5–5.3)
RBC # BLD: 4.03 M/UL — SIGNIFICANT CHANGE UP (ref 3.8–5.2)
RBC # FLD: 12.2 % — SIGNIFICANT CHANGE UP (ref 10.3–14.5)
SODIUM SERPL-SCNC: 140 MMOL/L — SIGNIFICANT CHANGE UP (ref 135–145)
WBC # BLD: 4.23 K/UL — SIGNIFICANT CHANGE UP (ref 3.8–10.5)
WBC # FLD AUTO: 4.23 K/UL — SIGNIFICANT CHANGE UP (ref 3.8–10.5)

## 2018-06-11 PROCEDURE — 85027 COMPLETE CBC AUTOMATED: CPT

## 2018-06-11 PROCEDURE — 84443 ASSAY THYROID STIM HORMONE: CPT

## 2018-06-11 PROCEDURE — 93005 ELECTROCARDIOGRAM TRACING: CPT

## 2018-06-11 PROCEDURE — 93306 TTE W/DOPPLER COMPLETE: CPT | Mod: 26

## 2018-06-11 PROCEDURE — 70553 MRI BRAIN STEM W/O & W/DYE: CPT

## 2018-06-11 PROCEDURE — 80053 COMPREHEN METABOLIC PANEL: CPT

## 2018-06-11 PROCEDURE — 80048 BASIC METABOLIC PNL TOTAL CA: CPT

## 2018-06-11 PROCEDURE — 84100 ASSAY OF PHOSPHORUS: CPT

## 2018-06-11 PROCEDURE — 87086 URINE CULTURE/COLONY COUNT: CPT

## 2018-06-11 PROCEDURE — 82330 ASSAY OF CALCIUM: CPT

## 2018-06-11 PROCEDURE — 84295 ASSAY OF SERUM SODIUM: CPT

## 2018-06-11 PROCEDURE — 83605 ASSAY OF LACTIC ACID: CPT

## 2018-06-11 PROCEDURE — 96374 THER/PROPH/DIAG INJ IV PUSH: CPT

## 2018-06-11 PROCEDURE — 82803 BLOOD GASES ANY COMBINATION: CPT

## 2018-06-11 PROCEDURE — 99285 EMERGENCY DEPT VISIT HI MDM: CPT | Mod: 25

## 2018-06-11 PROCEDURE — 83735 ASSAY OF MAGNESIUM: CPT

## 2018-06-11 PROCEDURE — 99239 HOSP IP/OBS DSCHRG MGMT >30: CPT

## 2018-06-11 PROCEDURE — 82435 ASSAY OF BLOOD CHLORIDE: CPT

## 2018-06-11 PROCEDURE — 84484 ASSAY OF TROPONIN QUANT: CPT

## 2018-06-11 PROCEDURE — 97161 PT EVAL LOW COMPLEX 20 MIN: CPT

## 2018-06-11 PROCEDURE — 84132 ASSAY OF SERUM POTASSIUM: CPT

## 2018-06-11 PROCEDURE — 93306 TTE W/DOPPLER COMPLETE: CPT

## 2018-06-11 PROCEDURE — 81001 URINALYSIS AUTO W/SCOPE: CPT

## 2018-06-11 PROCEDURE — A9585: CPT

## 2018-06-11 PROCEDURE — 82947 ASSAY GLUCOSE BLOOD QUANT: CPT

## 2018-06-11 PROCEDURE — 85014 HEMATOCRIT: CPT

## 2018-06-11 RX ORDER — ATORVASTATIN CALCIUM 80 MG/1
1 TABLET, FILM COATED ORAL
Qty: 0 | Refills: 0 | COMMUNITY

## 2018-06-11 RX ORDER — ATORVASTATIN CALCIUM 80 MG/1
1 TABLET, FILM COATED ORAL
Qty: 30 | Refills: 0
Start: 2018-06-11 | End: 2018-07-10

## 2018-06-11 RX ADMIN — Medication 81 MILLIGRAM(S): at 12:28

## 2018-06-11 RX ADMIN — FINGOLIMOD HCL 0.5 MILLIGRAM(S): 0.5 CAPSULE ORAL at 14:45

## 2018-06-11 RX ADMIN — ENOXAPARIN SODIUM 40 MILLIGRAM(S): 100 INJECTION SUBCUTANEOUS at 06:42

## 2018-06-11 NOTE — PROGRESS NOTE ADULT - SUBJECTIVE AND OBJECTIVE BOX
Patient is a 68y old  Female who presents with a chief complaint of syncope (2018 07:56)        SUBJECTIVE / OVERNIGHT EVENTS:  Feels ok. had some mild dizziness yesterday. no acute issues overnight.       MEDICATIONS  (STANDING):  aspirin enteric coated 81 milliGRAM(s) Oral daily  atorvastatin 40 milliGRAM(s) Oral at bedtime  cefTRIAXone   IVPB 1 Gram(s) IV Intermittent every 24 hours  enoxaparin Injectable 40 milliGRAM(s) SubCutaneous every 24 hours  fingolimod 0.5 milliGRAM(s) Oral daily    MEDICATIONS  (PRN):      Vital Signs Last 24 Hrs  T(C): 37 (10 Mitchel 2018 13:55), Max: 37 (10 Mitchel 2018 13:55)  T(F): 98.6 (10 Mitchel 2018 13:55), Max: 98.6 (10 Mitchel 2018 13:55)  HR: 69 (10 Mitchel 2018 13:55) (64 - 69)  BP: 151/76 (10 Mitchel 2018 13:55) (146/86 - 163/90)  BP(mean): --  RR: 18 (10 Mitchel 2018 13:55) (18 - 18)  SpO2: 95% (10 Mitchel 2018 13:55) (95% - 96%)  CAPILLARY BLOOD GLUCOSE        I&O's Summary    2018 07:01  -  10 Mitchel 2018 07:00  --------------------------------------------------------  IN: 720 mL / OUT: 0 mL / NET: 720 mL    10 Mitchel 2018 07:01  -  10 Mitchel 2018 15:00  --------------------------------------------------------  IN: 480 mL / OUT: 0 mL / NET: 480 mL          PHYSICAL EXAM  GENERAL: NAD, well-developed  HEAD:  Atraumatic, Normocephalic  EYES: EOMI, conjunctiva and sclera clear  NECK: Supple, No JVD  CHEST/LUNG: Clear to auscultation bilaterally; No wheeze  HEART: Regular rate and rhythm; No murmurs, rubs, or gallops  ABDOMEN: Soft, Nontender, Nondistended; Bowel sounds present  EXTREMITIES:  2+ Peripheral Pulses, No clubbing, cyanosis, or edema  PSYCH: AAOx3  SKIN: No rashes or lesions    LABS:                        13.5   4.85  )-----------( 266      ( 10 Mitchel 2018 06:51 )             41.0     06-10    141  |  104  |  15  ----------------------------<  95  4.1   |  24  |  0.98    Ca    9.3      10 Mitchel 2018 05:06  Phos  3.2     06-09  Mg     2.0     06-09    TPro  6.8  /  Alb  4.0  /  TBili  0.5  /  DBili  x   /  AST  14  /  ALT  10  /  AlkPhos  93  06-09      CARDIAC MARKERS ( 2018 20:21 )  x     / <0.01 ng/mL / x     / x     / x          Urinalysis Basic - ( 2018 20:37 )    Color: Yellow / Appearance: SL Turbid / S.026 / pH: x  Gluc: x / Ketone: Negative  / Bili: Negative / Urobili: Negative   Blood: x / Protein: 30 mg/dL / Nitrite: Negative   Leuk Esterase: Large / RBC: 5-10 /HPF / WBC 11-25 /HPF   Sq Epi: x / Non Sq Epi: OCC /HPF / Bacteria: Moderate /HPF        Culture - Urine (collected 2018 22:32)  Source: .Urine Clean Catch (Midstream)  Final Report (2018 22:08):    Culture grew 3 or more types of organisms which indicate    collection contamination; consider recollection only if clinically    indicated.        RADIOLOGY & ADDITIONAL TESTS:    Imaging Personally Reviewed:  Consultant(s) Notes Reviewed:    Care Discussed with Consultants/Other Providers:
Patient is a 68y old  Female who presents with a chief complaint of syncope (09 Jun 2018 07:56)        SUBJECTIVE / OVERNIGHT EVENTS:  Feel ok. some mild headaches but no dizziness.   afebrile. no acute issues overnight.     MEDICATIONS  (STANDING):  aspirin enteric coated 81 milliGRAM(s) Oral daily  atorvastatin 40 milliGRAM(s) Oral at bedtime  enoxaparin Injectable 40 milliGRAM(s) SubCutaneous every 24 hours  fingolimod 0.5 milliGRAM(s) Oral daily    MEDICATIONS  (PRN):      Vital Signs Last 24 Hrs  T(C): 36.4 (11 Jun 2018 12:29), Max: 37.2 (11 Jun 2018 03:52)  T(F): 97.5 (11 Jun 2018 12:29), Max: 98.9 (11 Jun 2018 03:52)  HR: 71 (11 Jun 2018 13:29) (69 - 71)  BP: 143/80 (11 Jun 2018 13:29) (126/73 - 180/76)  BP(mean): --  RR: 18 (11 Jun 2018 12:29) (18 - 18)  SpO2: 96% (11 Jun 2018 03:52) (95% - 96%)  CAPILLARY BLOOD GLUCOSE        I&O's Summary    10 Mitchel 2018 07:01  -  11 Jun 2018 07:00  --------------------------------------------------------  IN: 770 mL / OUT: 0 mL / NET: 770 mL          PHYSICAL EXAM  GENERAL: NAD, well-developed  HEAD:  Atraumatic, Normocephalic  EYES: EOMI, conjunctiva and sclera clear  NECK: Supple, No JVD  CHEST/LUNG: Clear to auscultation bilaterally; No wheeze  HEART: Regular rate and rhythm; No murmurs, rubs, or gallops  ABDOMEN: Soft, Nontender, Nondistended; Bowel sounds present  EXTREMITIES:  2+ Peripheral Pulses, No clubbing, cyanosis, or edema  PSYCH: AAOx3  SKIN: No rashes or lesions    LABS:                        12.6   4.23  )-----------( 259      ( 11 Jun 2018 06:17 )             39.4     06-11    140  |  103  |  19  ----------------------------<  93  3.7   |  22  |  0.87    Ca    9.0      11 Jun 2018 05:28                Culture - Urine (collected 08 Jun 2018 22:32)  Source: .Urine Clean Catch (Midstream)  Final Report (09 Jun 2018 22:08):    Culture grew 3 or more types of organisms which indicate    collection contamination; consider recollection only if clinically    indicated.        RADIOLOGY & ADDITIONAL TESTS:    Imaging Personally Reviewed:  Consultant(s) Notes Reviewed:    Care Discussed with Consultants/Other Providers:

## 2018-06-11 NOTE — PHYSICAL THERAPY INITIAL EVALUATION ADULT - PRECAUTIONS/LIMITATIONS, REHAB EVAL
MR head 6/10/18  Similar extensive confluent T2 and FLAIR hyperintense signal in the cerebral white matter, likely representing the presence of multiple sclerosis, given the patient's history.No abnormal parenchymal or leptomeningeal enhancement. No acute intracranial hemorrhage, mass effect, vasogenic edema, or evidence of acute territorial infarct. no known precautions/limitations/MR head 6/10/18  Similar extensive confluent T2 and FLAIR hyperintense signal in the cerebral white matter, likely representing the presence of multiple sclerosis, given the patient's history.No abnormal parenchymal or leptomeningeal enhancement. No acute intracranial hemorrhage, mass effect, vasogenic edema, or evidence of acute territorial infarct.

## 2018-06-11 NOTE — PROGRESS NOTE ADULT - PROBLEM SELECTOR PLAN 3
on Gilenya; non formulary; home med  will hold off steroids and check MR head to assess for disease progression
on Gilenya; non formulary; home med  No steroid needed, s/p MRI without acute worsening MS findings.

## 2018-06-11 NOTE — PROGRESS NOTE ADULT - ASSESSMENT
69 yo female with PMH of MS on Gilenya, HTN, HLD, ?CVA, ?Afib now presents here with possible syncope.
69 yo female with PMH of MS on Gilenya, HTN, HLD, ?CVA, ?Afib now presents here with possible syncope.

## 2018-06-11 NOTE — PROGRESS NOTE ADULT - PROBLEM SELECTOR PLAN 2
asymptomatic but will continue with treatment for now with IV ceftriaxone given MS and possible infection related with MS flare.
s/p 3 days of Abx now finsihed.

## 2018-06-11 NOTE — PHYSICAL THERAPY INITIAL EVALUATION ADULT - ADDITIONAL COMMENTS
Pt lives at home with son and grandchildren 2 steps to enter house with one hand rail, pt states she ambulates and performs ADL's independently

## 2018-06-11 NOTE — PROGRESS NOTE ADULT - PROBLEM SELECTOR PLAN 1
Patient with syncopal episode, unlikely to be due to MS flare  possible infectious cause ?UTI  orthostatics negative, monitor in telemetry  MR brain ordered per neurology request
Patient with syncopal episode, unlikely to be due to MS flare  possible infectious cause ?UTI  orthostatics negative, no events on tele.   MR brain unchanged. no acute flare up/worsening.   echo result pending.

## 2018-06-11 NOTE — PHYSICAL THERAPY INITIAL EVALUATION ADULT - PERTINENT HX OF CURRENT PROBLEM, REHAB EVAL
Pt is a 68 year old female admitted to [facility] on 6/8/18 for possible syncope pt with PMH of MS on Gilenya, HTN, HLD, CVA, Afib now presents here with possible syncope.

## 2018-06-11 NOTE — PHYSICAL THERAPY INITIAL EVALUATION ADULT - PLANNED THERAPY INTERVENTIONS, PT EVAL
gait training/Goal 6/11/18 2 weeks: Stair negotiation ascending and descending with one hand rail indpendently/strengthening/balance training

## 2019-07-14 ENCOUNTER — EMERGENCY (EMERGENCY)
Facility: HOSPITAL | Age: 69
LOS: 1 days | Discharge: HOME | End: 2019-07-14
Attending: EMERGENCY MEDICINE | Admitting: EMERGENCY MEDICINE
Payer: MEDICARE

## 2019-07-14 VITALS
WEIGHT: 145.06 LBS | RESPIRATION RATE: 14 BRPM | TEMPERATURE: 98 F | HEART RATE: 78 BPM | DIASTOLIC BLOOD PRESSURE: 90 MMHG | SYSTOLIC BLOOD PRESSURE: 155 MMHG | OXYGEN SATURATION: 97 %

## 2019-07-14 PROCEDURE — 73140 X-RAY EXAM OF FINGER(S): CPT | Mod: 26,RT

## 2019-07-14 PROCEDURE — 73140 X-RAY EXAM OF FINGER(S): CPT

## 2019-07-14 PROCEDURE — 99283 EMERGENCY DEPT VISIT LOW MDM: CPT

## 2019-07-14 PROCEDURE — 99283 EMERGENCY DEPT VISIT LOW MDM: CPT | Mod: 25

## 2019-07-14 RX ADMIN — Medication 1 TABLET(S): at 17:03

## 2019-07-14 NOTE — ED PROVIDER NOTE - PROGRESS NOTE DETAILS
discussed case with Dr. Baugh, recommend patient to have warm soaks 3-4 times a day, ok to start augmentin, f/u with her in office tomorrow discussed case with Dr Garcia from [facility] urgent care who suggested patient go to the ER for ultrasound of finger, initially he tried to get patient to have outpatient US but unable to schedule it today.  Explained to patient and family that US of finger is not emergent, can get it done outpatient tomorrow night or later this week if necessary

## 2019-07-14 NOTE — ED PROVIDER NOTE - NS ED ROS FT

## 2019-07-14 NOTE — ED ADULT NURSE NOTE - OBJECTIVE STATEMENT
Pt. received alert and oriented x3 with chief complaint of tip of right hand third finger swollen for 3 days. Pt. denies injury to affected area.

## 2019-07-14 NOTE — ED PROVIDER NOTE - NSFOLLOWUPINSTRUCTIONS_ED_ALL_ED_FT
1) Follow-up with your Primary Medical Doctor and Dr. Baugh. Call today / next business day for prompt follow-up.  2) Return to Emergency room for any worsening or persistent pain, weakness, fever, or any other concerning symptoms.  3) See attached instruction sheets for additional information, including information regarding signs and symptoms to look out for, reasons to seek immediate care and other important instructions.  4) Take augmentin as prescribed by your urgent care doctor

## 2019-07-14 NOTE — ED PROVIDER NOTE - OBJECTIVE STATEMENT
70 yo female c/o 3-4 days of right 3rd distal digit pain and swelling at pulp with bruising.  Nonradiating, moderate. Notes there was some purulent drainage. No medications taken for this. Sent from [facility] for further evaluation.  Had xray there that showed possible FB? Patient does not report any puncture injury or trauma.  No fever/chills.

## 2019-07-14 NOTE — ED PROVIDER NOTE - PHYSICAL EXAMINATION
Gen: Alert, NAD  Head/eyes: NC/AT, PERRL, EOMI, normal lids/conjunctiva, no scleral icterus  ENT: airway patent  Neck: supple, no tenderness/meningismus/JVD, Trachea midline  Pulm/lung: Bilateral clear BS, normal resp effort, no wheeze/stridor/retractions  CV/heart: RRR, no M/R/G, +2 dist pulses (radial, pedal DP/PT, popliteal)  GI/Abd: soft, NT/ND, +BS, no guarding/rebound tenderness  Musculoskeletal: no edema/erythema/cyanosis, FROM in all extremities, no C/T/L spine ttp  Skin: right 3rd digit distal pulp swelling, ecchymosis, +ttp, +small <0.5cm puncture wound noted, no active drainage  Neuro: AAOx3, CN 2-12 intact, normal sensation, 5/5 motor strength in all extremities, normal gait, no dysmetria

## 2019-10-14 ENCOUNTER — RESULT REVIEW (OUTPATIENT)
Age: 69
End: 2019-10-14

## 2019-12-26 NOTE — H&P ADULT - PROBLEM SELECTOR PLAN 5
on Gilenya; non formulary;   will confirm meds in AM and request family to bring medication  currently does not seem have flare ups  will hold off steroids  check MR head to assess for disease progression ?hx of CVA  c/w aspirin and lipitor c/w lipitor Rituxan Counseling:  I discussed with the patient the risks of Rituxan infusions. Side effects can include infusion reactions, severe drug rashes including mucocutaneous reactions, reactivation of latent hepatitis and other infections and rarely progressive multifocal leukoencephalopathy.  All of the patient's questions and concerns were addressed.

## 2023-11-09 NOTE — ED ADULT TRIAGE NOTE - CCCP TRG CHIEF CMPLNT
Dizziness
PAST MEDICAL HISTORY:  AF (atrial fibrillation) No A/C secondary to history of GI bleed  S/P PPM, S/P Watchman    BPH (benign prostatic hypertrophy)     Chronic venous insufficiency     Glaucoma     Sickle cell trait     Thalassemia

## 2024-02-11 ENCOUNTER — INPATIENT (INPATIENT)
Facility: HOSPITAL | Age: 74
LOS: 3 days | Discharge: OTHER INSTITUTION | DRG: 179 | End: 2024-02-15
Attending: INTERNAL MEDICINE | Admitting: INTERNAL MEDICINE
Payer: MEDICARE

## 2024-02-11 VITALS
TEMPERATURE: 99 F | OXYGEN SATURATION: 99 % | HEIGHT: 63 IN | HEART RATE: 67 BPM | RESPIRATION RATE: 20 BRPM | DIASTOLIC BLOOD PRESSURE: 76 MMHG | SYSTOLIC BLOOD PRESSURE: 132 MMHG | WEIGHT: 169.98 LBS

## 2024-02-11 DIAGNOSIS — J18.9 PNEUMONIA, UNSPECIFIED ORGANISM: ICD-10-CM

## 2024-02-11 LAB
ALBUMIN SERPL ELPH-MCNC: 4 G/DL — SIGNIFICANT CHANGE UP (ref 3.3–5)
ALP SERPL-CCNC: 96 U/L — SIGNIFICANT CHANGE UP (ref 30–120)
ALT FLD-CCNC: 14 U/L — SIGNIFICANT CHANGE UP (ref 10–60)
ANION GAP SERPL CALC-SCNC: 16 MMOL/L — SIGNIFICANT CHANGE UP (ref 5–17)
APPEARANCE UR: CLEAR — SIGNIFICANT CHANGE UP
APTT BLD: 29.3 SEC — SIGNIFICANT CHANGE UP (ref 24.5–35.6)
AST SERPL-CCNC: 20 U/L — SIGNIFICANT CHANGE UP (ref 10–40)
BASOPHILS # BLD AUTO: 0 K/UL — SIGNIFICANT CHANGE UP (ref 0–0.2)
BASOPHILS NFR BLD AUTO: 0 % — SIGNIFICANT CHANGE UP (ref 0–2)
BILIRUB SERPL-MCNC: 0.8 MG/DL — SIGNIFICANT CHANGE UP (ref 0.2–1.2)
BILIRUB UR-MCNC: NEGATIVE — SIGNIFICANT CHANGE UP
BUN SERPL-MCNC: 18 MG/DL — SIGNIFICANT CHANGE UP (ref 7–23)
CALCIUM SERPL-MCNC: 8.8 MG/DL — SIGNIFICANT CHANGE UP (ref 8.4–10.5)
CHLORIDE SERPL-SCNC: 100 MMOL/L — SIGNIFICANT CHANGE UP (ref 96–108)
CO2 SERPL-SCNC: 25 MMOL/L — SIGNIFICANT CHANGE UP (ref 22–31)
COLOR SPEC: YELLOW — SIGNIFICANT CHANGE UP
CREAT SERPL-MCNC: 1.22 MG/DL — SIGNIFICANT CHANGE UP (ref 0.5–1.3)
DIFF PNL FLD: NEGATIVE — SIGNIFICANT CHANGE UP
EGFR: 47 ML/MIN/1.73M2 — LOW
EOSINOPHIL # BLD AUTO: 0 K/UL — SIGNIFICANT CHANGE UP (ref 0–0.5)
EOSINOPHIL NFR BLD AUTO: 0 % — SIGNIFICANT CHANGE UP (ref 0–6)
FLUAV AG NPH QL: SIGNIFICANT CHANGE UP
FLUBV AG NPH QL: SIGNIFICANT CHANGE UP
GLUCOSE SERPL-MCNC: 128 MG/DL — HIGH (ref 70–99)
GLUCOSE UR QL: NEGATIVE MG/DL — SIGNIFICANT CHANGE UP
HCT VFR BLD CALC: 41.4 % — SIGNIFICANT CHANGE UP (ref 34.5–45)
HGB BLD-MCNC: 13.8 G/DL — SIGNIFICANT CHANGE UP (ref 11.5–15.5)
INR BLD: 1 RATIO — SIGNIFICANT CHANGE UP (ref 0.85–1.18)
KETONES UR-MCNC: NEGATIVE MG/DL — SIGNIFICANT CHANGE UP
LACTATE SERPL-SCNC: 1.8 MMOL/L — SIGNIFICANT CHANGE UP (ref 0.7–2)
LEUKOCYTE ESTERASE UR-ACNC: NEGATIVE — SIGNIFICANT CHANGE UP
LYMPHOCYTES # BLD AUTO: 0.62 K/UL — LOW (ref 1–3.3)
LYMPHOCYTES # BLD AUTO: 4 % — LOW (ref 13–44)
MANUAL SMEAR VERIFICATION: SIGNIFICANT CHANGE UP
MCHC RBC-ENTMCNC: 32 PG — SIGNIFICANT CHANGE UP (ref 27–34)
MCHC RBC-ENTMCNC: 33.3 GM/DL — SIGNIFICANT CHANGE UP (ref 32–36)
MCV RBC AUTO: 96.1 FL — SIGNIFICANT CHANGE UP (ref 80–100)
MONOCYTES # BLD AUTO: 0.46 K/UL — SIGNIFICANT CHANGE UP (ref 0–0.9)
MONOCYTES NFR BLD AUTO: 3 % — SIGNIFICANT CHANGE UP (ref 2–14)
NEUTROPHILS # BLD AUTO: 14.22 K/UL — HIGH (ref 1.8–7.4)
NEUTROPHILS NFR BLD AUTO: 85 % — HIGH (ref 43–77)
NEUTS BAND # BLD: 7 % — SIGNIFICANT CHANGE UP (ref 0–8)
NITRITE UR-MCNC: NEGATIVE — SIGNIFICANT CHANGE UP
NRBC # BLD: 0 /100 WBCS — SIGNIFICANT CHANGE UP (ref 0–0)
NRBC # BLD: SIGNIFICANT CHANGE UP /100 WBCS (ref 0–0)
PH UR: 5.5 — SIGNIFICANT CHANGE UP (ref 5–8)
PLAT MORPH BLD: NORMAL — SIGNIFICANT CHANGE UP
PLATELET # BLD AUTO: 269 K/UL — SIGNIFICANT CHANGE UP (ref 150–400)
POTASSIUM SERPL-MCNC: 3 MMOL/L — LOW (ref 3.5–5.3)
POTASSIUM SERPL-SCNC: 3 MMOL/L — LOW (ref 3.5–5.3)
PROT SERPL-MCNC: 7.7 G/DL — SIGNIFICANT CHANGE UP (ref 6–8.3)
PROT UR-MCNC: NEGATIVE MG/DL — SIGNIFICANT CHANGE UP
PROTHROM AB SERPL-ACNC: 10.9 SEC — SIGNIFICANT CHANGE UP (ref 9.5–13)
RBC # BLD: 4.31 M/UL — SIGNIFICANT CHANGE UP (ref 3.8–5.2)
RBC # FLD: 12.2 % — SIGNIFICANT CHANGE UP (ref 10.3–14.5)
RBC BLD AUTO: NORMAL — SIGNIFICANT CHANGE UP
RSV RNA NPH QL NAA+NON-PROBE: SIGNIFICANT CHANGE UP
SARS-COV-2 RNA SPEC QL NAA+PROBE: SIGNIFICANT CHANGE UP
SODIUM SERPL-SCNC: 141 MMOL/L — SIGNIFICANT CHANGE UP (ref 135–145)
SP GR SPEC: 1.01 — SIGNIFICANT CHANGE UP (ref 1–1.03)
UROBILINOGEN FLD QL: 0.2 MG/DL — SIGNIFICANT CHANGE UP (ref 0.2–1)
VARIANT LYMPHS # BLD: 1 % — SIGNIFICANT CHANGE UP (ref 0–6)
WBC # BLD: 15.46 K/UL — HIGH (ref 3.8–10.5)
WBC # FLD AUTO: 15.46 K/UL — HIGH (ref 3.8–10.5)

## 2024-02-11 PROCEDURE — 71045 X-RAY EXAM CHEST 1 VIEW: CPT | Mod: 26

## 2024-02-11 PROCEDURE — 71250 CT THORAX DX C-: CPT | Mod: 26

## 2024-02-11 PROCEDURE — 70450 CT HEAD/BRAIN W/O DYE: CPT | Mod: 26,MA

## 2024-02-11 PROCEDURE — 99285 EMERGENCY DEPT VISIT HI MDM: CPT

## 2024-02-11 RX ORDER — ASPIRIN/CALCIUM CARB/MAGNESIUM 324 MG
81 TABLET ORAL DAILY
Refills: 0 | Status: DISCONTINUED | OUTPATIENT
Start: 2024-02-11 | End: 2024-02-15

## 2024-02-11 RX ORDER — PANTOPRAZOLE SODIUM 20 MG/1
1 TABLET, DELAYED RELEASE ORAL
Refills: 0 | DISCHARGE

## 2024-02-11 RX ORDER — ATORVASTATIN CALCIUM 80 MG/1
40 TABLET, FILM COATED ORAL AT BEDTIME
Refills: 0 | Status: DISCONTINUED | OUTPATIENT
Start: 2024-02-11 | End: 2024-02-12

## 2024-02-11 RX ORDER — FINGOLIMOD HCL 0.5 MG/1
1 CAPSULE ORAL
Qty: 0 | Refills: 0 | DISCHARGE

## 2024-02-11 RX ORDER — ENOXAPARIN SODIUM 100 MG/ML
40 INJECTION SUBCUTANEOUS EVERY 24 HOURS
Refills: 0 | Status: DISCONTINUED | OUTPATIENT
Start: 2024-02-11 | End: 2024-02-15

## 2024-02-11 RX ORDER — ACETAMINOPHEN 500 MG
650 TABLET ORAL EVERY 6 HOURS
Refills: 0 | Status: DISCONTINUED | OUTPATIENT
Start: 2024-02-11 | End: 2024-02-15

## 2024-02-11 RX ORDER — PIPERACILLIN AND TAZOBACTAM 4; .5 G/20ML; G/20ML
3.38 INJECTION, POWDER, LYOPHILIZED, FOR SOLUTION INTRAVENOUS ONCE
Refills: 0 | Status: COMPLETED | OUTPATIENT
Start: 2024-02-11 | End: 2024-02-11

## 2024-02-11 RX ORDER — POTASSIUM CHLORIDE 20 MEQ
40 PACKET (EA) ORAL ONCE
Refills: 0 | Status: COMPLETED | OUTPATIENT
Start: 2024-02-11 | End: 2024-02-11

## 2024-02-11 RX ORDER — BACLOFEN 100 %
1 POWDER (GRAM) MISCELLANEOUS
Refills: 0 | DISCHARGE

## 2024-02-11 RX ORDER — PIPERACILLIN AND TAZOBACTAM 4; .5 G/20ML; G/20ML
3.38 INJECTION, POWDER, LYOPHILIZED, FOR SOLUTION INTRAVENOUS EVERY 8 HOURS
Refills: 0 | Status: DISCONTINUED | OUTPATIENT
Start: 2024-02-11 | End: 2024-02-15

## 2024-02-11 RX ORDER — SODIUM CHLORIDE 9 MG/ML
1000 INJECTION INTRAMUSCULAR; INTRAVENOUS; SUBCUTANEOUS ONCE
Refills: 0 | Status: COMPLETED | OUTPATIENT
Start: 2024-02-11 | End: 2024-02-11

## 2024-02-11 RX ORDER — IBANDRONATE SODIUM 150 MG/1
1 TABLET ORAL
Refills: 0 | DISCHARGE

## 2024-02-11 RX ORDER — SODIUM CHLORIDE 9 MG/ML
1000 INJECTION, SOLUTION INTRAVENOUS
Refills: 0 | Status: DISCONTINUED | OUTPATIENT
Start: 2024-02-11 | End: 2024-02-15

## 2024-02-11 RX ORDER — ATORVASTATIN CALCIUM 80 MG/1
1 TABLET, FILM COATED ORAL
Refills: 0 | DISCHARGE

## 2024-02-11 RX ORDER — SIPONIMOD 0.25 MG/1
1 TABLET, FILM COATED ORAL
Refills: 0 | DISCHARGE

## 2024-02-11 RX ORDER — ACETAMINOPHEN 500 MG
2 TABLET ORAL
Refills: 0 | DISCHARGE

## 2024-02-11 RX ORDER — FINGOLIMOD HCL 0.5 MG/1
0.5 CAPSULE ORAL DAILY
Refills: 0 | Status: DISCONTINUED | OUTPATIENT
Start: 2024-02-11 | End: 2024-02-13

## 2024-02-11 RX ORDER — VANCOMYCIN HCL 1 G
1000 VIAL (EA) INTRAVENOUS ONCE
Refills: 0 | Status: COMPLETED | OUTPATIENT
Start: 2024-02-11 | End: 2024-02-11

## 2024-02-11 RX ADMIN — PIPERACILLIN AND TAZOBACTAM 3.38 GRAM(S): 4; .5 INJECTION, POWDER, LYOPHILIZED, FOR SOLUTION INTRAVENOUS at 18:00

## 2024-02-11 RX ADMIN — PIPERACILLIN AND TAZOBACTAM 25 GRAM(S): 4; .5 INJECTION, POWDER, LYOPHILIZED, FOR SOLUTION INTRAVENOUS at 22:28

## 2024-02-11 RX ADMIN — SODIUM CHLORIDE 50 MILLILITER(S): 9 INJECTION, SOLUTION INTRAVENOUS at 22:28

## 2024-02-11 RX ADMIN — ENOXAPARIN SODIUM 40 MILLIGRAM(S): 100 INJECTION SUBCUTANEOUS at 22:28

## 2024-02-11 RX ADMIN — PIPERACILLIN AND TAZOBACTAM 200 GRAM(S): 4; .5 INJECTION, POWDER, LYOPHILIZED, FOR SOLUTION INTRAVENOUS at 17:35

## 2024-02-11 RX ADMIN — SODIUM CHLORIDE 1000 MILLILITER(S): 9 INJECTION INTRAMUSCULAR; INTRAVENOUS; SUBCUTANEOUS at 17:14

## 2024-02-11 RX ADMIN — ATORVASTATIN CALCIUM 40 MILLIGRAM(S): 80 TABLET, FILM COATED ORAL at 22:28

## 2024-02-11 RX ADMIN — Medication 40 MILLIEQUIVALENT(S): at 17:35

## 2024-02-11 RX ADMIN — Medication 250 MILLIGRAM(S): at 18:05

## 2024-02-11 NOTE — PATIENT PROFILE ADULT - FUNCTIONAL ASSESSMENT - BASIC MOBILITY 6.
2-calculated by average/Not able to assess (calculate score using [facility] averaging method)

## 2024-02-11 NOTE — ED PROVIDER NOTE - ENMT, MLM
Breath sounds clear and equal bilaterally.
Airway patent, Nasal mucosa clear. Mouth with normal mucosa. Throat has no vesicles, no oropharyngeal exudates and uvula is midline.

## 2024-02-11 NOTE — PATIENT PROFILE ADULT - PATIENT REPRESENTATIVE: ( YOU CAN CHOOSE ANY PERSON THAT CAN ASSIST YOU WITH YOUR HEALTH CARE PREFERENCES, DOES NOT HAVE TO BE A SPOUSE, IMMEDIATE FAMILY OR SIGNIFICANT OTHER/PARTNER)
PAtient to ED with complaint of right sided flank pain and hematuria.  Hx of kidney stones.  TOradol given at UC
declines

## 2024-02-11 NOTE — ED PROVIDER NOTE - CONSTITUTIONAL, MLM
frail appearing, awake, alert, oriented to person, place, time/situation and in no apparent distress. normal...

## 2024-02-11 NOTE — ED ADULT TRIAGE NOTE - ACCOMPANIED BY
Right knee stiffness for some weeks or months but onset of pain with popping clicking and locking 2 days ago. Patient denies injury. Patient denies fever. The history is provided by the patient. Knee Pain    This is a new problem. The current episode started 2 days ago. The problem occurs constantly. The problem has not changed since onset. The pain is present in the right knee. The pain is moderate. Associated symptoms include limited range of motion and stiffness. Pertinent negatives include no numbness and no tingling. The symptoms are aggravated by movement, standing and activity. She has tried nothing for the symptoms. There has been no history of extremity trauma. History reviewed. No pertinent past medical history. History reviewed. No pertinent surgical history. History reviewed. No pertinent family history.     Social History     Socioeconomic History    Marital status:      Spouse name: Not on file    Number of children: Not on file    Years of education: Not on file    Highest education level: Not on file   Occupational History    Not on file   Social Needs    Financial resource strain: Not on file    Food insecurity     Worry: Not on file     Inability: Not on file    Transportation needs     Medical: Not on file     Non-medical: Not on file   Tobacco Use    Smoking status: Never Smoker    Smokeless tobacco: Never Used   Substance and Sexual Activity    Alcohol use: Not on file    Drug use: Not on file    Sexual activity: Not on file   Lifestyle    Physical activity     Days per week: Not on file     Minutes per session: Not on file    Stress: Not on file   Relationships    Social connections     Talks on phone: Not on file     Gets together: Not on file     Attends [employer]ism service: Not on file     Active member of club or organization: Not on file     Attends meetings of clubs or organizations: Not on file     Relationship status: Not on file    Intimate partner violence     Fear of current or ex partner: Not on file     Emotionally abused: Not on file     Physically abused: Not on file     Forced sexual activity: Not on file   Other Topics Concern    Not on file   Social History Narrative    Not on file         ALLERGIES: Patient has no known allergies. Review of Systems   Constitutional: Negative for fever. Gastrointestinal: Negative for vomiting. Genitourinary: Negative for vaginal discharge. Musculoskeletal: Positive for stiffness. Neurological: Negative for tingling, weakness and numbness. Vitals:    11/13/20 2117 11/13/20 2129   BP: 124/76    Pulse: 68    Resp: 16    Temp: 98.1 °F (36.7 °C)    SpO2: 100% 100%   Weight: 158.8 kg (350 lb)    Height: 5' 2\" (1.575 m)             Physical Exam  Vitals signs and nursing note reviewed. Cardiovascular:      Rate and Rhythm: Normal rate and regular rhythm. Heart sounds: Normal heart sounds. Pulmonary:      Effort: Pulmonary effort is normal.      Breath sounds: Normal breath sounds. Musculoskeletal:      Right knee: She exhibits decreased range of motion, abnormal patellar mobility, bony tenderness and abnormal meniscus. She exhibits no swelling, no effusion, no ecchymosis, no deformity, no laceration, no erythema, normal alignment, no LCL laxity and no MCL laxity. Tenderness found. Lateral joint line tenderness noted. No medial joint line, no MCL, no LCL and no patellar tendon tenderness noted. Legs:           MDM  Number of Diagnoses or Management Options  Diagnosis management comments: X-ray evaluation is unremarkable. Clinical exam reveals some concern for patellar mobility and lateral meniscus injury. Patient to be referred to orthopedics. Rice therapy. Amount and/or Complexity of Data Reviewed  Tests in the radiology section of CPT®: ordered and reviewed (Xr Knee Rt 3 V    Result Date: 11/13/2020  EXAM: Right knee x-rays. INDICATION: Pain. COMPARISON: None.  TECHNIQUE: 3 views. FINDINGS: The osseous and articular structures are normal. There is no acute fracture or dislocation. The patient's large body habitus limits for evaluation of joint effusion.      IMPRESSION: Negative study.    )           Procedures EMT/paramedic

## 2024-02-11 NOTE — ED PROVIDER NOTE - CLINICAL SUMMARY MEDICAL DECISION MAKING FREE TEXT BOX
73-year-old female with history of multiple sclerosis, CVA, tremor, brought by ambulance from [facility] for evaluation of increasing weakness and inability to ambulate today.  Patient states she thinks she had a fever and had some vomiting and diarrhea over the past few days.  Poor historian cannot elaborate on her condition.  Her PCP is Dr. Bliss.    Physical exam is nonfocal.  Afebrile at present.  Impression is MS patient with increasing weakness and an inability to ambulate.  Rule out infection rule out dehydration rule out MS exacerbation.  Plan is labs urine chest x-ray CT brain IV fluids and likely admission for further evaluation and treatment.

## 2024-02-11 NOTE — PATIENT PROFILE ADULT - FALL HARM RISK - RISK INTERVENTIONS

## 2024-02-11 NOTE — ED ADULT NURSE NOTE - OBJECTIVE STATEMENT
patient has c/o weakness since this morning with fever as per patient, denies  cough, hx of ms, patient walks with a walker, IV accessed and tolerating. Labs drawn & sent results pending. ekg done, plan of care ongoing

## 2024-02-11 NOTE — ED PROVIDER NOTE - OBJECTIVE STATEMENT
73-year-old female with history of multiple sclerosis, CVA, tremor, brought by ambulance from [facility] for evaluation of increasing weakness and inability to ambulate today.  Patient states she thinks she had a fever and had some vomiting and diarrhea over the past few days.  Poor historian cannot elaborate on her condition.  Her PCP is Dr. Bliss.

## 2024-02-11 NOTE — ED ADULT NURSE NOTE - NSFALLHARMRISKINTERV_ED_ALL_ED

## 2024-02-12 ENCOUNTER — TRANSCRIPTION ENCOUNTER (OUTPATIENT)
Age: 74
End: 2024-02-12

## 2024-02-12 DIAGNOSIS — J69.0 PNEUMONITIS DUE TO INHALATION OF FOOD AND VOMIT: ICD-10-CM

## 2024-02-12 DIAGNOSIS — G35 MULTIPLE SCLEROSIS: ICD-10-CM

## 2024-02-12 DIAGNOSIS — D72.829 ELEVATED WHITE BLOOD CELL COUNT, UNSPECIFIED: ICD-10-CM

## 2024-02-12 DIAGNOSIS — R13.10 DYSPHAGIA, UNSPECIFIED: ICD-10-CM

## 2024-02-12 DIAGNOSIS — E86.0 DEHYDRATION: ICD-10-CM

## 2024-02-12 DIAGNOSIS — E78.5 HYPERLIPIDEMIA, UNSPECIFIED: ICD-10-CM

## 2024-02-12 DIAGNOSIS — I10 ESSENTIAL (PRIMARY) HYPERTENSION: ICD-10-CM

## 2024-02-12 DIAGNOSIS — R26.2 DIFFICULTY IN WALKING, NOT ELSEWHERE CLASSIFIED: ICD-10-CM

## 2024-02-12 DIAGNOSIS — E87.6 HYPOKALEMIA: ICD-10-CM

## 2024-02-12 DIAGNOSIS — Z29.9 ENCOUNTER FOR PROPHYLACTIC MEASURES, UNSPECIFIED: ICD-10-CM

## 2024-02-12 LAB
ANION GAP SERPL CALC-SCNC: 11 MMOL/L — SIGNIFICANT CHANGE UP (ref 5–17)
BUN SERPL-MCNC: 7 MG/DL — SIGNIFICANT CHANGE UP (ref 7–23)
CALCIUM SERPL-MCNC: 9.1 MG/DL — SIGNIFICANT CHANGE UP (ref 8.4–10.5)
CHLORIDE SERPL-SCNC: 104 MMOL/L — SIGNIFICANT CHANGE UP (ref 96–108)
CO2 SERPL-SCNC: 26 MMOL/L — SIGNIFICANT CHANGE UP (ref 22–31)
CREAT SERPL-MCNC: 0.9 MG/DL — SIGNIFICANT CHANGE UP (ref 0.5–1.3)
CULTURE RESULTS: SIGNIFICANT CHANGE UP
EGFR: 68 ML/MIN/1.73M2 — SIGNIFICANT CHANGE UP
GLUCOSE SERPL-MCNC: 125 MG/DL — HIGH (ref 70–99)
HCT VFR BLD CALC: 40.6 % — SIGNIFICANT CHANGE UP (ref 34.5–45)
HGB BLD-MCNC: 13.7 G/DL — SIGNIFICANT CHANGE UP (ref 11.5–15.5)
MAGNESIUM SERPL-MCNC: 2 MG/DL — SIGNIFICANT CHANGE UP (ref 1.6–2.6)
MCHC RBC-ENTMCNC: 32.1 PG — SIGNIFICANT CHANGE UP (ref 27–34)
MCHC RBC-ENTMCNC: 33.7 GM/DL — SIGNIFICANT CHANGE UP (ref 32–36)
MCV RBC AUTO: 95.1 FL — SIGNIFICANT CHANGE UP (ref 80–100)
MRSA PCR RESULT.: SIGNIFICANT CHANGE UP
NRBC # BLD: 0 /100 WBCS — SIGNIFICANT CHANGE UP (ref 0–0)
PHOSPHATE SERPL-MCNC: 3 MG/DL — SIGNIFICANT CHANGE UP (ref 2.5–4.5)
PLATELET # BLD AUTO: 251 K/UL — SIGNIFICANT CHANGE UP (ref 150–400)
POTASSIUM SERPL-MCNC: 2.9 MMOL/L — CRITICAL LOW (ref 3.5–5.3)
POTASSIUM SERPL-SCNC: 2.9 MMOL/L — CRITICAL LOW (ref 3.5–5.3)
RBC # BLD: 4.27 M/UL — SIGNIFICANT CHANGE UP (ref 3.8–5.2)
RBC # FLD: 12.3 % — SIGNIFICANT CHANGE UP (ref 10.3–14.5)
S AUREUS DNA NOSE QL NAA+PROBE: SIGNIFICANT CHANGE UP
SODIUM SERPL-SCNC: 141 MMOL/L — SIGNIFICANT CHANGE UP (ref 135–145)
SPECIMEN SOURCE: SIGNIFICANT CHANGE UP
WBC # BLD: 11.53 K/UL — HIGH (ref 3.8–10.5)
WBC # FLD AUTO: 11.53 K/UL — HIGH (ref 3.8–10.5)

## 2024-02-12 PROCEDURE — 93010 ELECTROCARDIOGRAM REPORT: CPT

## 2024-02-12 RX ORDER — CHOLECALCIFEROL (VITAMIN D3) 125 MCG
1000 CAPSULE ORAL DAILY
Refills: 0 | Status: DISCONTINUED | OUTPATIENT
Start: 2024-02-12 | End: 2024-02-15

## 2024-02-12 RX ORDER — PREGABALIN 225 MG/1
1000 CAPSULE ORAL DAILY
Refills: 0 | Status: DISCONTINUED | OUTPATIENT
Start: 2024-02-12 | End: 2024-02-15

## 2024-02-12 RX ORDER — BACLOFEN 100 %
10 POWDER (GRAM) MISCELLANEOUS EVERY 8 HOURS
Refills: 0 | Status: DISCONTINUED | OUTPATIENT
Start: 2024-02-12 | End: 2024-02-15

## 2024-02-12 RX ORDER — POTASSIUM CHLORIDE 20 MEQ
40 PACKET (EA) ORAL ONCE
Refills: 0 | Status: COMPLETED | OUTPATIENT
Start: 2024-02-12 | End: 2024-02-12

## 2024-02-12 RX ORDER — AMLODIPINE BESYLATE 2.5 MG/1
5 TABLET ORAL DAILY
Refills: 0 | Status: DISCONTINUED | OUTPATIENT
Start: 2024-02-13 | End: 2024-02-15

## 2024-02-12 RX ORDER — ATORVASTATIN CALCIUM 80 MG/1
20 TABLET, FILM COATED ORAL AT BEDTIME
Refills: 0 | Status: DISCONTINUED | OUTPATIENT
Start: 2024-02-12 | End: 2024-02-15

## 2024-02-12 RX ORDER — PANTOPRAZOLE SODIUM 20 MG/1
40 TABLET, DELAYED RELEASE ORAL
Refills: 0 | Status: DISCONTINUED | OUTPATIENT
Start: 2024-02-12 | End: 2024-02-15

## 2024-02-12 RX ORDER — METOPROLOL TARTRATE 50 MG
25 TABLET ORAL
Refills: 0 | Status: DISCONTINUED | OUTPATIENT
Start: 2024-02-12 | End: 2024-02-15

## 2024-02-12 RX ORDER — POTASSIUM CHLORIDE 20 MEQ
10 PACKET (EA) ORAL
Refills: 0 | Status: COMPLETED | OUTPATIENT
Start: 2024-02-12 | End: 2024-02-12

## 2024-02-12 RX ORDER — FUROSEMIDE 40 MG
40 TABLET ORAL DAILY
Refills: 0 | Status: DISCONTINUED | OUTPATIENT
Start: 2024-02-12 | End: 2024-02-15

## 2024-02-12 RX ADMIN — PIPERACILLIN AND TAZOBACTAM 25 GRAM(S): 4; .5 INJECTION, POWDER, LYOPHILIZED, FOR SOLUTION INTRAVENOUS at 14:17

## 2024-02-12 RX ADMIN — PIPERACILLIN AND TAZOBACTAM 25 GRAM(S): 4; .5 INJECTION, POWDER, LYOPHILIZED, FOR SOLUTION INTRAVENOUS at 22:15

## 2024-02-12 RX ADMIN — PIPERACILLIN AND TAZOBACTAM 25 GRAM(S): 4; .5 INJECTION, POWDER, LYOPHILIZED, FOR SOLUTION INTRAVENOUS at 05:08

## 2024-02-12 RX ADMIN — Medication 40 MILLIEQUIVALENT(S): at 18:26

## 2024-02-12 RX ADMIN — Medication 100 MILLIEQUIVALENT(S): at 20:13

## 2024-02-12 RX ADMIN — Medication 100 MILLIEQUIVALENT(S): at 17:32

## 2024-02-12 RX ADMIN — ENOXAPARIN SODIUM 40 MILLIGRAM(S): 100 INJECTION SUBCUTANEOUS at 22:15

## 2024-02-12 RX ADMIN — Medication 10 MILLIGRAM(S): at 14:17

## 2024-02-12 RX ADMIN — Medication 10 MILLIGRAM(S): at 22:15

## 2024-02-12 RX ADMIN — ATORVASTATIN CALCIUM 20 MILLIGRAM(S): 80 TABLET, FILM COATED ORAL at 22:15

## 2024-02-12 RX ADMIN — Medication 100 MILLIEQUIVALENT(S): at 18:51

## 2024-02-12 RX ADMIN — Medication 81 MILLIGRAM(S): at 11:08

## 2024-02-12 RX ADMIN — Medication 25 MILLIGRAM(S): at 17:18

## 2024-02-12 NOTE — H&P ADULT - TIME BILLING
75minutes spent on this visit, 50% visit time spent in care co-ordination with other attendings and counselling patient ,writing admission orders ( see complete and current orders and order section) ,requesting necessary consults ,informing family about status & plan of care .I have discussed care plan with [facility] wellness/admitting /nursing   department ,outpatient PCP , hospital consultants , ER physician & med staff .

## 2024-02-12 NOTE — H&P ADULT - ASSESSMENT
· Assessment	  The patient is a 73 year old female with a history of HTN, HL, CVA, MS who presents with weakness  - ECG with nonspecific findings  - Continue metoprolol tartrate 25 mg bid  - Continue amlodipine 5 mg daily  - Continue atorvastatin 40 mg sasha  - Continue aspirin 81 mg daily  - CT chest with multifocal PNA  - IV antibiotics  - Continue IV fluids as neededAfebrile at present.  Impression is MS patient with increasing weakness and an inability to ambulate.  Rule out infection rule out dehydration rule out MS exacerbation.  Plan is labs urine chest x-ray CT brain IV fluids and likely admission for further evaluation and treatment.

## 2024-02-12 NOTE — SWALLOW BEDSIDE ASSESSMENT ADULT - SWALLOW EVAL: DIAGNOSIS
Pt demonstrates functional oral stage swallow for regular texture solids and thin liquids. SUspect mild pharyngeal dysphagia marked by appearance of delayed swallow initiation across consistencies with no overt signs of aspiration/penetration

## 2024-02-12 NOTE — SWALLOW BEDSIDE ASSESSMENT ADULT - COMMENTS
per charting - History of Present Illness: The patient is a 73 year old female with a history of HTN, HL, CVA, MS who presents with weakness. The patient is a poor historian. She has had increasing generalized weakness and inability to walk. She has had a fever and vomiting at [facility].  CT HEAD - IMPRESSION:   advanced periventricular, deep and subcortical white matter   ischemia. Tiny old lacunar infarctions in the BILATERAL basal ganglia.      Moderate global atrophy, most prominent in the cerebellum.  CT CHEST - multifocal pna  Pt seen at bedside for swallow eval. Pt AAO, following commands and cooperative with assessment. Denies difficulty swallowing. Reports recent PNA episode in December, believes unknown etiology.

## 2024-02-12 NOTE — PROGRESS NOTE ADULT - SUBJECTIVE AND OBJECTIVE BOX
PROGRESS NOTE  Patient is a 73y old  Female who presents with a chief complaint of     OVERNIGHT      HPI:    PAST MEDICAL & SURGICAL HISTORY:  Stroke      HTN (hypertension)      HLD (hyperlipidemia)      Multiple sclerosis          MEDICATIONS  (STANDING):  aspirin enteric coated 81 milliGRAM(s) Oral daily  atorvastatin 40 milliGRAM(s) Oral at bedtime  dextrose 5% + sodium chloride 0.45%. 1000 milliLiter(s) (50 mL/Hr) IV Continuous <Continuous>  enoxaparin Injectable 40 milliGRAM(s) SubCutaneous every 24 hours  fingolimod 0.5 milliGRAM(s) Oral daily  metoprolol tartrate 25 milliGRAM(s) Oral two times a day  piperacillin/tazobactam IVPB.. 3.375 Gram(s) IV Intermittent every 8 hours    MEDICATIONS  (PRN):  acetaminophen     Tablet .. 650 milliGRAM(s) Oral every 6 hours PRN Temp greater or equal to 38C (100.4F), Mild Pain (1 - 3)      OBJECTIVE    T(C): 36.6 (24 @ 09:18), Max: 37.1 (24 @ 16:12)  HR: 71 (24 @ 09:18) (66 - 82)  BP: 146/66 (24 @ 09:18) (128/70 - 146/66)  RR: 18 (24 @ 09:18) (18 - 20)  SpO2: 94% (24 @ 09:18) (94% - 99%)  Wt(kg): --  I&O's Summary    2024 07:01  -  2024 07:00  --------------------------------------------------------  IN: 600 mL / OUT: 0 mL / NET: 600 mL          REVIEW OF SYSTEMS:  CONSTITUTIONAL: No fever, weight loss, or fatigue  EYES: No eye pain, visual disturbances, or discharge  ENMT:   No sinus or throat pain  NECK: No pain or stiffness  RESPIRATORY: No cough, wheezing, chills or hemoptysis; No shortness of breath  CARDIOVASCULAR: No chest pain, palpitations, dizziness, or leg swelling  GASTROINTESTINAL: No abdominal pain. No nausea, vomiting; No diarrhea or constipation. No melena or hematochezia.  GENITOURINARY: No dysuria, frequency, hematuria, or incontinence  NEUROLOGICAL: No headaches, memory loss, loss of strength, numbness, or tremors  SKIN: No itching, burning, rashes, or lesions   MUSCULOSKELETAL: No joint pain or swelling; No muscle, back, or extremity pain    PHYSICAL EXAM:  Appearance: NAD. VS past 24 hrs -as above   HEENT:   Moist oral mucosa. Conjunctiva clear b/l.   Neck : supple  Respiratory: Lungs CTAB.  Gastrointestinal:  Soft, nontender. No rebound. No rigidity. BS present	  Cardiovascular: RRR ,S1S2 present  Neurologic: Non-focal. Moving all extremities.  Extremities: No edema. No erythema. No calf tenderness.  Skin: No rashes, No ecchymoses, No cyanosis.	  wounds ,skin lesions-See skin assesment flow sheet   LABS:                        13.8   15.46 )-----------( 269      ( 2024 16:45 )             41.4     02-11    141  |  100  |  18  ----------------------------<  128<H>  3.0<L>   |  25  |  1.22    Ca    8.8      2024 16:45    TPro  7.7  /  Alb  4.0  /  TBili  0.8  /  DBili  x   /  AST  20  /  ALT  14  /  AlkPhos  96  02-11    CAPILLARY BLOOD GLUCOSE        PT/INR - ( 2024 16:45 )   PT: 10.9 sec;   INR: 1.00 ratio         PTT - ( 2024 16:45 )  PTT:29.3 sec  Urinalysis Basic - ( 2024 16:45 )    Color: Yellow / Appearance: Clear / S.009 / pH: x  Gluc: 128 mg/dL / Ketone: Negative mg/dL  / Bili: Negative / Urobili: 0.2 mg/dL   Blood: x / Protein: Negative mg/dL / Nitrite: Negative   Leuk Esterase: Negative / RBC: x / WBC x   Sq Epi: x / Non Sq Epi: x / Bacteria: x        RADIOLOGY & ADDITIONAL TESTS:   reviewed elctronically  ASSESSMENT/PLAN:

## 2024-02-12 NOTE — PROVIDER CONTACT NOTE (CRITICAL VALUE NOTIFICATION) - ASSESSMENT
Receiving daily Lasix, IV Zosyn infusing. On easy to chew diet. No nausea/vomitting. Denies chest pain/dizziness.

## 2024-02-12 NOTE — H&P ADULT - NSICDXPASTMEDICALHX_GEN_ALL_CORE_FT
PAST MEDICAL HISTORY:  HLD (hyperlipidemia)     HTN (hypertension)     Multiple sclerosis     Stroke

## 2024-02-12 NOTE — H&P ADULT - NSHPLABSRESULTS_GEN_ALL_CORE
< from: CT Chest No Cont (02.11.24 @ 19:16) >      FINDINGS:    AIRWAYS AND LUNGS: The central tracheobronchial tree is patent.  Patchy   left lung opacities. Right lower lobe calcified granuloma.    MEDIASTINUM AND PLEURA: There are no enlarged mediastinal, hilar or   axillary lymph nodes. The visualized portion of the thyroid gland is   unremarkable. There is no pleural effusion. There is no pneumothorax.    HEART AND VESSELS: There is mild cardiomegaly.  There are atherosclerotic   calcifications of the aorta and coronary arteries.  There is no   pericardial effusion.    UPPER ABDOMEN: Images of the upper abdomen demonstrate cholelithiasis.    BONES AND SOFT TISSUES: The bones are unremarkable.  The soft tissues are   unremarkable.      IMPRESSION:  Multifocal pneumonia    < end of copied text >    FINDINGS:   MRI dated 06/10/2018 available for review.    The brain demonstrates advanced periventricular, deep and subcortical   white matter ischemia. Tiny old lacunar infarctions in the BILATERAL   basal ganglia. No acute cerebral cortical infarct is seen.  No   intracranial hemorrhage is found.  No mass effect is found in the brain.    The ventricles, sulci and basal cisterns show moderate global atrophy.    The orbits are unremarkable.  The paranasal sinuses are clear.  The nasal   cavity appears intact.  The nasopharynx is symmetric.  The central skull   base, petrous temporal bones and calvarium remain intact.      IMPRESSION:   advanced periventricular, deep and subcortical white matter   ischemia. Tiny old lacunar infarctions in the BILATERAL basal ganglia.      Moderate global atrophy, most prominent in the cerebellum.    < end of copied text >    FINDINGS:  Heart/Vascular: The heart size, mediastinum, hilum and aorta are within   normal limits for projection. Atherosclerotic change.  Pulmonary: Midline trachea. There is patchy airspace opacities left   perihilar, lingular and lower lobe. There is no pleural effusion and the   right lung is grossly clear  Bones: There is no fracture. Generative changes of the spine.  Lines and catheter: None    Impression:    There is patchy airspace opacities left perihilar, lingular and lower   lobe. Findings could represent pneumonia    Recommend correlation clinically and follow-up until radiographic   resolution.    < end of copied text >

## 2024-02-12 NOTE — SWALLOW BEDSIDE ASSESSMENT ADULT - ASR SWALLOW RECOMMEND DIAG
for objective view of swallow function/assess for silent aspiration given repeated pna and hx MS/VFSS/MBS

## 2024-02-12 NOTE — PROGRESS NOTE ADULT - SUBJECTIVE AND OBJECTIVE BOX
CHIEF COMPLAINT/ REASON FOR VISIT  .. Patient was seen to address the  issue listed under PROBLEM LIST which is located toward bottom of this note     CAROLINA OROPEZA    SY EDIP 11    Allergies    No Known Allergies    Intolerances        PAST MEDICAL & SURGICAL HISTORY:  Stroke      HTN (hypertension)      HLD (hyperlipidemia)      Multiple sclerosis          FAMILY HISTORY:      Home Medications:  acetaminophen 500 mg oral tablet: 2 tab(s) orally 4 times a day (2024 19:25)  amLODIPine 5 mg oral tablet: 1 tab(s) orally once a day htn (:25)  atorvastatin 20 mg oral tablet: 1 tab(s) orally once a day (at bedtime) (:25)  baclofen 10 mg oral tablet: 1 tab(s) orally 3 times a day (:26)  Boniva 150 mg oral tablet: 1 tab(s) orally once a month (:26)  furosemide 40 mg oral tablet: 1 tab(s) orally once a day (:)  Mayzent 2 mg oral tablet: 1 tab(s) orally once a day (:26)  metoprolol tartrate 25 mg oral tablet: 1 tab(s) orally 2 times a day (:26)  Protonix 20 mg oral delayed release tablet: 1 tab(s) orally once a day (2024 19:26)  vit b12 1000mcq: everyday (2024 19:26)  vitamin d3: 1000 IU tab (2024 19:26)      MEDICATIONS  (STANDING):  aspirin enteric coated 81 milliGRAM(s) Oral daily  atorvastatin 40 milliGRAM(s) Oral at bedtime  dextrose 5% + sodium chloride 0.45%. 1000 milliLiter(s) (50 mL/Hr) IV Continuous <Continuous>  enoxaparin Injectable 40 milliGRAM(s) SubCutaneous every 24 hours  fingolimod 0.5 milliGRAM(s) Oral daily  piperacillin/tazobactam IVPB.. 3.375 Gram(s) IV Intermittent every 8 hours    MEDICATIONS  (PRN):  acetaminophen     Tablet .. 650 milliGRAM(s) Oral every 6 hours PRN Temp greater or equal to 38C (100.4F), Mild Pain (1 - 3)              Vital Signs Last 24 Hrs  T(C): 36.9 (2024 04:37), Max: 37.1 (2024 16:12)  T(F): 98.5 (2024 04:37), Max: 98.8 (2024 16:12)  HR: 70 (2024 04:37) (66 - 82)  BP: 128/71 (:37) (128/70 - 135/75)  BP(mean): --  RR: 18 (:37) (18 - 20)  SpO2: 97% (:37) (97% - 99%)    Parameters below as of :37  Patient On (Oxygen Delivery Method): room air          24 @ 07:01  -  24 @ 07:00  --------------------------------------------------------  IN: 600 mL / OUT: 0 mL / NET: 600 mL              LABS:                        13.8   15.46 )-----------( 269      ( 2024 16:45 )             41.4     02-11    141  |  100  |  18  ----------------------------<  128<H>  3.0<L>   |  25  |  1.22    Ca    8.8      2024 16:45    TPro  7.7  /  Alb  4.0  /  TBili  0.8  /  DBili  x   /  AST  20  /  ALT  14  /  AlkPhos  96  02-11    PT/INR - ( 2024 16:45 )   PT: 10.9 sec;   INR: 1.00 ratio         PTT - ( 2024 16:45 )  PTT:29.3 sec  Urinalysis Basic - ( 2024 16:45 )    Color: Yellow / Appearance: Clear / S.009 / pH: x  Gluc: 128 mg/dL / Ketone: Negative mg/dL  / Bili: Negative / Urobili: 0.2 mg/dL   Blood: x / Protein: Negative mg/dL / Nitrite: Negative   Leuk Esterase: Negative / RBC: x / WBC x   Sq Epi: x / Non Sq Epi: x / Bacteria: x            WBC:  WBC Count: 15.46 K/uL ( @ 16:45)      MICROBIOLOGY:  RECENT CULTURES:              PT/INR - ( 2024 16:45 )   PT: 10.9 sec;   INR: 1.00 ratio         PTT - ( 2024 16:45 )  PTT:29.3 sec    Sodium:  Sodium: 141 mmol/L ( @ 16:45)      1.22 mg/dL  @ 16:45      Hemoglobin:  Hemoglobin: 13.8 g/dL ( @ 16:45)      Platelets: Platelet Count - Automated: 269 K/uL ( @ 16:45)      LIVER FUNCTIONS - ( 2024 16:45 )  Alb: 4.0 g/dL / Pro: 7.7 g/dL / ALK PHOS: 96 U/L / ALT: 14 U/L / AST: 20 U/L / GGT: x             Urinalysis Basic - ( 2024 16:45 )    Color: Yellow / Appearance: Clear / S.009 / pH: x  Gluc: 128 mg/dL / Ketone: Negative mg/dL  / Bili: Negative / Urobili: 0.2 mg/dL   Blood: x / Protein: Negative mg/dL / Nitrite: Negative   Leuk Esterase: Negative / RBC: x / WBC x   Sq Epi: x / Non Sq Epi: x / Bacteria: x        RADIOLOGY & ADDITIONAL STUDIES:      MICROBIOLOGY:  RECENT CULTURES:

## 2024-02-12 NOTE — H&P ADULT - HISTORY OF PRESENT ILLNESS
73-year-old female with history of multiple sclerosis, CVA, tremor, brought by ambulance from [facility] for evaluation of increasing weakness and inability to ambulate today.  Patient states she thinks she had a fever and had some vomiting and diarrhea over the past few days.  Poor historian cannot elaborate on her condition.  Her PCP is Dr. Bliss.

## 2024-02-13 ENCOUNTER — TRANSCRIPTION ENCOUNTER (OUTPATIENT)
Age: 74
End: 2024-02-13

## 2024-02-13 LAB
ANION GAP SERPL CALC-SCNC: 12 MMOL/L — SIGNIFICANT CHANGE UP (ref 5–17)
BUN SERPL-MCNC: 5 MG/DL — LOW (ref 7–23)
CALCIUM SERPL-MCNC: 9.3 MG/DL — SIGNIFICANT CHANGE UP (ref 8.4–10.5)
CHLORIDE SERPL-SCNC: 105 MMOL/L — SIGNIFICANT CHANGE UP (ref 96–108)
CO2 SERPL-SCNC: 24 MMOL/L — SIGNIFICANT CHANGE UP (ref 22–31)
CREAT SERPL-MCNC: 0.81 MG/DL — SIGNIFICANT CHANGE UP (ref 0.5–1.3)
EGFR: 78 ML/MIN/1.73M2 — SIGNIFICANT CHANGE UP
GLUCOSE SERPL-MCNC: 135 MG/DL — HIGH (ref 70–99)
HCT VFR BLD CALC: 41.6 % — SIGNIFICANT CHANGE UP (ref 34.5–45)
HCV AB S/CO SERPL IA: 0.08 S/CO — SIGNIFICANT CHANGE UP (ref 0–0.99)
HCV AB SERPL-IMP: SIGNIFICANT CHANGE UP
HGB BLD-MCNC: 14.2 G/DL — SIGNIFICANT CHANGE UP (ref 11.5–15.5)
MCHC RBC-ENTMCNC: 32.6 PG — SIGNIFICANT CHANGE UP (ref 27–34)
MCHC RBC-ENTMCNC: 34.1 GM/DL — SIGNIFICANT CHANGE UP (ref 32–36)
MCV RBC AUTO: 95.4 FL — SIGNIFICANT CHANGE UP (ref 80–100)
NRBC # BLD: 0 /100 WBCS — SIGNIFICANT CHANGE UP (ref 0–0)
NT-PROBNP SERPL-SCNC: 901 PG/ML — HIGH (ref 0–125)
PLATELET # BLD AUTO: 275 K/UL — SIGNIFICANT CHANGE UP (ref 150–400)
POTASSIUM SERPL-MCNC: 3.2 MMOL/L — LOW (ref 3.5–5.3)
POTASSIUM SERPL-SCNC: 3.2 MMOL/L — LOW (ref 3.5–5.3)
RBC # BLD: 4.36 M/UL — SIGNIFICANT CHANGE UP (ref 3.8–5.2)
RBC # FLD: 12.3 % — SIGNIFICANT CHANGE UP (ref 10.3–14.5)
SODIUM SERPL-SCNC: 141 MMOL/L — SIGNIFICANT CHANGE UP (ref 135–145)
WBC # BLD: 10.75 K/UL — HIGH (ref 3.8–10.5)
WBC # FLD AUTO: 10.75 K/UL — HIGH (ref 3.8–10.5)

## 2024-02-13 PROCEDURE — 74230 X-RAY XM SWLNG FUNCJ C+: CPT | Mod: 26

## 2024-02-13 RX ORDER — POTASSIUM CHLORIDE 20 MEQ
10 PACKET (EA) ORAL
Refills: 0 | Status: COMPLETED | OUTPATIENT
Start: 2024-02-13 | End: 2024-02-13

## 2024-02-13 RX ORDER — POTASSIUM CHLORIDE 20 MEQ
40 PACKET (EA) ORAL ONCE
Refills: 0 | Status: COMPLETED | OUTPATIENT
Start: 2024-02-13 | End: 2024-02-13

## 2024-02-13 RX ORDER — NYSTATIN CREAM 100000 [USP'U]/G
1 CREAM TOPICAL
Refills: 0 | Status: DISCONTINUED | OUTPATIENT
Start: 2024-02-13 | End: 2024-02-15

## 2024-02-13 RX ADMIN — Medication 100 MILLIEQUIVALENT(S): at 12:05

## 2024-02-13 RX ADMIN — Medication 100 MILLIEQUIVALENT(S): at 11:11

## 2024-02-13 RX ADMIN — PIPERACILLIN AND TAZOBACTAM 25 GRAM(S): 4; .5 INJECTION, POWDER, LYOPHILIZED, FOR SOLUTION INTRAVENOUS at 05:15

## 2024-02-13 RX ADMIN — PIPERACILLIN AND TAZOBACTAM 25 GRAM(S): 4; .5 INJECTION, POWDER, LYOPHILIZED, FOR SOLUTION INTRAVENOUS at 21:49

## 2024-02-13 RX ADMIN — Medication 25 MILLIGRAM(S): at 05:15

## 2024-02-13 RX ADMIN — Medication 40 MILLIGRAM(S): at 05:16

## 2024-02-13 RX ADMIN — Medication 1000 UNIT(S): at 12:04

## 2024-02-13 RX ADMIN — Medication 10 MILLIGRAM(S): at 21:49

## 2024-02-13 RX ADMIN — ATORVASTATIN CALCIUM 20 MILLIGRAM(S): 80 TABLET, FILM COATED ORAL at 21:49

## 2024-02-13 RX ADMIN — Medication 40 MILLIEQUIVALENT(S): at 17:34

## 2024-02-13 RX ADMIN — NYSTATIN CREAM 1 APPLICATION(S): 100000 CREAM TOPICAL at 17:34

## 2024-02-13 RX ADMIN — Medication 10 MILLIGRAM(S): at 05:16

## 2024-02-13 RX ADMIN — PIPERACILLIN AND TAZOBACTAM 25 GRAM(S): 4; .5 INJECTION, POWDER, LYOPHILIZED, FOR SOLUTION INTRAVENOUS at 15:50

## 2024-02-13 RX ADMIN — AMLODIPINE BESYLATE 5 MILLIGRAM(S): 2.5 TABLET ORAL at 05:15

## 2024-02-13 RX ADMIN — Medication 100 MILLIEQUIVALENT(S): at 13:24

## 2024-02-13 RX ADMIN — Medication 81 MILLIGRAM(S): at 12:04

## 2024-02-13 RX ADMIN — Medication 25 MILLIGRAM(S): at 17:33

## 2024-02-13 RX ADMIN — PREGABALIN 1000 MICROGRAM(S): 225 CAPSULE ORAL at 12:04

## 2024-02-13 RX ADMIN — Medication 10 MILLIGRAM(S): at 13:24

## 2024-02-13 RX ADMIN — ENOXAPARIN SODIUM 40 MILLIGRAM(S): 100 INJECTION SUBCUTANEOUS at 21:49

## 2024-02-13 RX ADMIN — PANTOPRAZOLE SODIUM 40 MILLIGRAM(S): 20 TABLET, DELAYED RELEASE ORAL at 05:16

## 2024-02-13 NOTE — PHYSICAL THERAPY INITIAL EVALUATION ADULT - ADL SKILLS, REHAB EVAL
Home Care:  · Application of superficial heat (thermacare patches, heating pad etc.)  · Home based exercises  · NSAIDS (Non-steroidal anti-inflammatories example ibuprofen)    (Diagnosis and Treatment of Low Back Pain: A Joint Clinical Practice  Guideline from the American College of Physicians and the American  Pain Society Annals of Internal Medicine  Issue: Volume 147(7), 2 October 2007, pp I-38)           independent/needs device

## 2024-02-13 NOTE — CARE COORDINATION ASSESSMENT. - NSPASTMEDSURGHISTORY_GEN_ALL_CORE_FT
PAST MEDICAL & SURGICAL HISTORY:  HLD (hyperlipidemia)      HTN (hypertension)      Stroke      Multiple sclerosis

## 2024-02-13 NOTE — CARE COORDINATION ASSESSMENT. - NSCAREPROVIDERS_GEN_ALL_CORE_FT
CARE PROVIDERS:  Accepting Physician: Lilly Reeves  Administration: Sally Monge  Administration: Lety Ch  Administration: Jaye Reynolds  Admitting: Lilly Reeves  Attending: Lilly Reeves  Case Management: Nena Mcwilliams  Case Management: Santaromana, Anna  Consultant: Evangelista Cross  Consultant: Scooter Mendoza  Consultant: Larry Roper  ED Attending: Sukhwinder Moeller  ED Nurse: Norma Willoughby  Infection Control: Daniela Grider  Nurse: Cheri Gil  Nurse: Can Corcoran  Nurse: Marie Justice  Ordered: Physician, Ordering  Ordered: ServiceAccount, [facility] Center[facility]M  Override: Swapna Joaquin  PCA/Nursing Assistant: Beth Ambrose  Primary Team: Flakito Lowery  : Aurelia Sanches  Speech Pathology: Evette Pillai  Speech Pathology: Kartik Flores  UR// Supp. Assoc.: Brianna Farnkel   CARE PROVIDERS:  Accepting Physician: Lilly Reeves  Administration: Sally Monge  Administration: Andrés Childers  Administration: Lety Ch  Administration: Jaye Reynolds  Admitting: Lilly Reeves  Attending: Lilly Reeves  Case Management: eNna Mcwilliams  Case Management: Santaromana, Anna  Consultant: Scooter Mendoza  Consultant: Evangelista Cross  Consultant: Larry Roper  ED Attending: Sukhwinder Moeller  ED Nurse: Norma Willoughby  Infection Control: Daniela Grider  Nurse: Cheri Gil  Nurse: Marie Justice  Nurse: Can Corcoran  Ordered: Physician, Ordering  Ordered: ServiceAccount, SCMMLM  PCA/Nursing Assistant: Beth Ambrose  Physical Therapy: Chester Granados  Physical Therapy: Amara Morales  Physical Therapy: Danie Marlow  Physical Therapy: Washington Noramn  Primary Team: Flakito Lowery  : Aurelia Sanches  : Christi Ahumada  Speech Pathology: Evette Pillai  Speech Pathology: Kartik Flores  UR// Supp. Assoc.: Brianna Frankel

## 2024-02-13 NOTE — PATIENT CHOICE NOTE. - NSPTCHOICESTATE_GEN_ALL_CORE

## 2024-02-13 NOTE — CARE COORDINATION ASSESSMENT. - NSDCPLANSERVICES_GEN_ALL_CORE
CM met with pt, explained role of CM. Pt noted to be very alert, oriented, and stated that she has been managing her own care @ assisted living facility. Pt stated that had MS in the past (20 yrs ago), and had a stroke (approx 10 yrs ago). Pt stated that prior to admission she was ambulating with rolling walker independently @ assisted living facility. Pt's assisted living facility MD is DR Bliss [phone], pharmacy is [facility] . Pt also stated that her community neurologist is Osvaldo Wakefield [phone]. Pt stated that her family will likely transport her back to assisted living facility. CM has reached out to [facility] [phone]/fax [phone]. Assisted living facility staff stated that pt utilizes [facility]  [phone]/fax [phone], and confirmed that pt has been utilizing rolling walker independently. CM apprised assisted living facility that pt has deferred to them regarding coordination of any home care services. CM has discussed home care services, insurance provisions, choices of agencies. Assisted living facility staff has recommended that CM send referral to [facility] [phone]/fax [phone] for home care. CM will refer case accordingly. CM remains available  and continues to follow case. CM met with pt, explained role of CM. Pt noted to be very alert, oriented, and stated that she has been managing her own care @ assisted living facility. Pt provided CM with [facility] [phone]. Pt stated that had MS in the past (20 yrs ago), and possibly a stroke (approx 10 yrs ago). Pt stated that prior to admission she was ambulating with rolling walker independently @ assisted living facility. Pt's assisted living facility MD is DR Bliss [phone], pharmacy is [facility]. Pt also stated that her community neurologist is Osvaldo Wakefield [phone]. Pt stated that her family will likely transport her back to assisted living facility. CM has reached out to [facility] [phone]/fax [phone]. Assisted living facility staff stated that pt utilizes [facility]  [phone]/fax [phone], and confirmed that pt has been utilizing rolling walker independently. CM apprised assisted living facility that pt has deferred to them regarding coordination of any home care services. CM has discussed home care services, insurance provisions, choices of agencies. Assisted living facility staff has recommended that CM send referral to [facility] [phone]/fax [phone] for home care. CM will refer case accordingly. CM also discussed with pt about her case being identified as a CMS STAR case and provided pt with Transitions of Care info. CM remains available  and continues to follow case.

## 2024-02-13 NOTE — SWALLOW VFSS/MBS ASSESSMENT ADULT - COMMENTS
The pt was received in the [facility] radiology chair in the lateral plane. Pt was awake, alert, and in NAD. Pt denied any present or past difficulty chewing or swallowing. However, she endorsed having a hx of PNA x2. Of note, pt has a medical dx of MS. Pt's was noted to be impulsive with difficulty following directions. Pt required consistent repetition of directions and multimodlaity cueing throughout the study. Following the evaluation, the pt was awake, alert, and in NAD. The pt was received in the [facility] radiology chair in the lateral plane. Pt was awake, alert, and in NAD. Pt denied any present or past difficulty chewing or swallowing. However, she endorsed having a recent hx of PNA.  Of note, pt has a medical dx of MS. Pt was noted to be impulsive with difficulty following directions. Pt required consistent repetition of directions and multimodality cueing throughout the study. Following the evaluation, the pt was awake, alert, and in NAD. The pt was received in the [facility] radiology chair in the lateral plane. Pt was awake, alert, and in NAD. Pt denied any present or past difficulty chewing or swallowing. However, she endorsed having a recent hx of PNA. Pt was noted to be impulsive with difficulty following directions. Pt required consistent repetition of directions and multimodality cueing throughout the study. Following the study, the pt was awake, alert, and in NAD. She was left with radiology staff awaiting transport back to the unit.

## 2024-02-13 NOTE — OCCUPATIONAL THERAPY INITIAL EVALUATION ADULT - LEVEL OF INDEPENDENCE: TOILET, REHAB EVAL
Bilateral MAMMO Bilat Screen DDI+ARTIE.

 

CLINICAL HISTORY:

Patient is 66 years old and is seen for screening. The patient has no family

history of breast cancer.  The patient has no personal history of cancer.

 

VIEWS:

The views performed were:  bilateral craniocaudal with tomosynthesis and

bilateral mediolateral oblique with tomosynthesis.

 

FILMS COMPARED:

The present examination has been compared to prior imaging studies performed at

[facility] on 11/06/2015, 11/16/2016 and 11/21/2017, and at [facility] on 11/28/2017.

 

This study has been interpreted with the assistance of computer-aided detection.

 

MAMMOGRAM FINDINGS:

There are scattered fibroglandular densities.

 

There are no suspicious masses, suspicious calcifications, or new areas of

architectural distortion.

 

IMPRESSION:

THERE IS NO MAMMOGRAPHIC EVIDENCE OF MALIGNANCY.

 

A ROUTINE FOLLOW-UP MAMMOGRAM IN 1 YEAR IS RECOMMENDED.

 

THE RESULTS OF THIS EXAM WERE SENT TO THE PATIENT.

 

ACR BI-RADS Category 1 - Negative

 

MAMMOGRAPHY NOTE:

 1. A negative mammogram report should not delay a biopsy if a dominant of

 clinically suspicious mass is present.

 2. Approximately 10% to 15% of breast cancers are not detected by

 mammography.

 3. Adenosis and dense breasts may obscure an underlying neoplasm.

 

 

Reported by: ANGELES MORALES MD

Electonically Signed: [id number]
+prima fit

## 2024-02-13 NOTE — OCCUPATIONAL THERAPY INITIAL EVALUATION ADULT - PERTINENT HX OF CURRENT PROBLEM, REHAB EVAL
73-year-old female with history of multiple sclerosis, CVA, tremor, brought by ambulance on 2/11/24 from [facility] for evaluation of increasing weakness and inability to ambulate today.  Patient states she thinks she had a fever and had some vomiting and diarrhea over the past few days.  CT 2/11/24 Multifocal pneumonia

## 2024-02-13 NOTE — DISCHARGE NOTE NURSING/CASE MANAGEMENT/SOCIAL WORK - NSDCCRNAME_GEN_ALL_CORE_FT
You are a resident of [facility] [phone]/fax [phone]. Utilizing [facility]  [phone]// fax [phone].

## 2024-02-13 NOTE — PROGRESS NOTE ADULT - SUBJECTIVE AND OBJECTIVE BOX
CHIEF COMPLAINT/ REASON FOR VISIT  .. Patient was seen to address the  issue listed under PROBLEM LIST which is located toward bottom of this note     CAROLINA OROPEZA    SY 1EST 89 W1    Allergies    No Known Allergies    Intolerances        PAST MEDICAL & SURGICAL HISTORY:  Stroke      HTN (hypertension)      HLD (hyperlipidemia)      Multiple sclerosis          FAMILY HISTORY:      Home Medications:  acetaminophen 500 mg oral tablet: 2 tab(s) orally 4 times a day (11 Feb 2024 19:25)  amLODIPine 5 mg oral tablet: 1 tab(s) orally once a day htn (11 Feb 2024 19:25)  atorvastatin 20 mg oral tablet: 1 tab(s) orally once a day (at bedtime) (11 Feb 2024 19:25)  baclofen 10 mg oral tablet: 1 tab(s) orally 3 times a day (11 Feb 2024 19:26)  Boniva 150 mg oral tablet: 1 tab(s) orally once a month (11 Feb 2024 19:26)  furosemide 40 mg oral tablet: 1 tab(s) orally once a day (11 Feb 2024 19:26)  Mayzent 2 mg oral tablet: 1 tab(s) orally once a day (11 Feb 2024 19:26)  metoprolol tartrate 25 mg oral tablet: 1 tab(s) orally 2 times a day (11 Feb 2024 19:26)  Protonix 20 mg oral delayed release tablet: 1 tab(s) orally once a day (11 Feb 2024 19:26)  vit b12 1000mcq: everyday (11 Feb 2024 19:26)  vitamin d3: 1000 IU tab (11 Feb 2024 19:26)      MEDICATIONS  (STANDING):  amLODIPine   Tablet 5 milliGRAM(s) Oral daily  aspirin enteric coated 81 milliGRAM(s) Oral daily  atorvastatin 20 milliGRAM(s) Oral at bedtime  baclofen 10 milliGRAM(s) Oral every 8 hours  cholecalciferol 1000 Unit(s) Oral daily  cyanocobalamin 1000 MICROGram(s) Oral daily  dextrose 5% + sodium chloride 0.45%. 1000 milliLiter(s) (50 mL/Hr) IV Continuous <Continuous>  enoxaparin Injectable 40 milliGRAM(s) SubCutaneous every 24 hours  fingolimod 0.5 milliGRAM(s) Oral daily  furosemide    Tablet 40 milliGRAM(s) Oral daily  metoprolol tartrate 25 milliGRAM(s) Oral two times a day  pantoprazole    Tablet 40 milliGRAM(s) Oral before breakfast  piperacillin/tazobactam IVPB.. 3.375 Gram(s) IV Intermittent every 8 hours    MEDICATIONS  (PRN):  acetaminophen     Tablet .. 650 milliGRAM(s) Oral every 6 hours PRN Temp greater or equal to 38C (100.4F), Mild Pain (1 - 3)              Vital Signs Last 24 Hrs  T(C): 37.2 (13 Feb 2024 05:21), Max: 37.2 (13 Feb 2024 05:21)  T(F): 99 (13 Feb 2024 05:21), Max: 99 (13 Feb 2024 05:21)  HR: 74 (13 Feb 2024 05:21) (69 - 84)  BP: 151/74 (13 Feb 2024 05:21) (138/66 - 162/73)  BP(mean): --  RR: 18 (13 Feb 2024 05:21) (18 - 18)  SpO2: 92% (13 Feb 2024 05:21) (92% - 98%)    Parameters below as of 13 Feb 2024 05:21  Patient On (Oxygen Delivery Method): room air          02-12-24 @ 07:01  -  02-13-24 @ 07:00  --------------------------------------------------------  IN: 450 mL / OUT: 1450 mL / NET: -1000 mL              LABS:                        14.2   10.75 )-----------( 275      ( 13 Feb 2024 08:08 )             41.6     02-12    141  |  104  |  7   ----------------------------<  125<H>  2.9<LL>   |  26  |  0.90    Ca    9.1      12 Feb 2024 16:00  Phos  3.0     02-12  Mg     2.0     02-12    TPro  7.7  /  Alb  4.0  /  TBili  0.8  /  DBili  x   /  AST  20  /  ALT  14  /  AlkPhos  96  02-11    PT/INR - ( 11 Feb 2024 16:45 )   PT: 10.9 sec;   INR: 1.00 ratio         PTT - ( 11 Feb 2024 16:45 )  PTT:29.3 sec  Urinalysis Basic - ( 12 Feb 2024 16:00 )    Color: x / Appearance: x / SG: x / pH: x  Gluc: 125 mg/dL / Ketone: x  / Bili: x / Urobili: x   Blood: x / Protein: x / Nitrite: x   Leuk Esterase: x / RBC: x / WBC x   Sq Epi: x / Non Sq Epi: x / Bacteria: x            WBC:  WBC Count: 10.75 K/uL (02-13 @ 08:08)  WBC Count: 11.53 K/uL (02-12 @ 16:00)  WBC Count: 15.46 K/uL (02-11 @ 16:45)      MICROBIOLOGY:  RECENT CULTURES:  02-11 .Blood Blood XXXX XXXX   No growth at 24 hours                PT/INR - ( 11 Feb 2024 16:45 )   PT: 10.9 sec;   INR: 1.00 ratio         PTT - ( 11 Feb 2024 16:45 )  PTT:29.3 sec    Sodium:  Sodium: 141 mmol/L (02-12 @ 16:00)  Sodium: 141 mmol/L (02-11 @ 16:45)      0.90 mg/dL 02-12 @ 16:00  1.22 mg/dL 02-11 @ 16:45      Hemoglobin:  Hemoglobin: 14.2 g/dL (02-13 @ 08:08)  Hemoglobin: 13.7 g/dL (02-12 @ 16:00)  Hemoglobin: 13.8 g/dL (02-11 @ 16:45)      Platelets: Platelet Count - Automated: 275 K/uL (02-13 @ 08:08)  Platelet Count - Automated: 251 K/uL (02-12 @ 16:00)  Platelet Count - Automated: 269 K/uL (02-11 @ 16:45)      LIVER FUNCTIONS - ( 11 Feb 2024 16:45 )  Alb: 4.0 g/dL / Pro: 7.7 g/dL / ALK PHOS: 96 U/L / ALT: 14 U/L / AST: 20 U/L / GGT: x             Urinalysis Basic - ( 12 Feb 2024 16:00 )    Color: x / Appearance: x / SG: x / pH: x  Gluc: 125 mg/dL / Ketone: x  / Bili: x / Urobili: x   Blood: x / Protein: x / Nitrite: x   Leuk Esterase: x / RBC: x / WBC x   Sq Epi: x / Non Sq Epi: x / Bacteria: x        RADIOLOGY & ADDITIONAL STUDIES:      MICROBIOLOGY:  RECENT CULTURES:  02-11 .Blood Blood XXXX XXXX   No growth at 24 hours

## 2024-02-13 NOTE — DISCHARGE NOTE NURSING/CASE MANAGEMENT/SOCIAL WORK - PATIENT PORTAL LINK FT
You can access the FollowMyHealth Patient Portal offered by [facility] by registering at the following website: [email or web address]. By joining [facility]’s FollowMyHealth portal, you will also be able to view your health information using other applications (apps) compatible with our system.

## 2024-02-13 NOTE — CAREGIVER ENGAGEMENT NOTE - CAREGIVER EDUCATION HOME CARE SERVICES - FREE TEXT
Skilled Nursing and Physical therapy services thru home care agency-[facility] [phone]/fax [phone]

## 2024-02-13 NOTE — SWALLOW VFSS/MBS ASSESSMENT ADULT - ADDITIONAL RECOMMENDATIONS
1. ST To f/u while the pt is in-house as schedule permits. 1. ST to f/u while the pt is in-house as schedule permits.

## 2024-02-13 NOTE — CARE COORDINATION ASSESSMENT. - HOME EQUIPMENT YN
pt stated that she has a rolling walker @ assisted living facility but could not recall thru what DME company/No

## 2024-02-13 NOTE — PHYSICAL THERAPY INITIAL EVALUATION ADULT - PERTINENT HX OF CURRENT PROBLEM, REHAB EVAL
As per chart, 73-year-old female with history of multiple sclerosis, CVA, tremor, brought by ambulance from [facility] for evaluation of increasing weakness and inability to ambulate today.  Patient states she thinks she had a fever and had some vomiting and diarrhea over the past few days.  She presents with weakness in bilateral LEs As per chart, 73-year-old female with history of multiple sclerosis, CVA, tremor, brought by ambulance from [facility] for evaluation of increasing weakness and inability to ambulate today.  Patient states she thinks she had a fever and had some vomiting and diarrhea over the past few days.  She presents with weakness in bilateral LEs. CT chest- multifocal pneumonia. CT head- advanced periventricular, deep and subcortical white matter ischemia. Tiny old lacunar infarctions in the bilateral basal ganglia. Moderate global atrophy, most prominent in cerebellum. As per chart, 73-year-old female with history of multiple sclerosis, CVA, tremor, brought by ambulance from [facility] for evaluation of increasing weakness and inability to ambulate today.  Patient states she thinks she had a fever and had some vomiting and diarrhea over the past few days.  She presents with weakness in bilateral LEs. CT chest- multifocal pneumonia. CT head- advanced periventricular, deep and subcortical white matter ischemia. Tiny old lacunar infarctions in the bilateral basal ganglia. Moderate global atrophy, most prominent in cerebellum. X-ray Chest- patchy airspace opacities left perihilar, lingular and lower lobe

## 2024-02-13 NOTE — PHYSICAL THERAPY INITIAL EVALUATION ADULT - RANGE OF MOTION EXAMINATION, REHAB EVAL
bilateral knee flexion limited 60 degrees, ankle df limited 5 degrees, ankle pf limited 5 degrees/bilateral upper extremity ROM was WFL (within functional limits)/deficits as listed below

## 2024-02-13 NOTE — DISCHARGE NOTE NURSING/CASE MANAGEMENT/SOCIAL WORK - NSDCPEFALRISK_GEN_ALL_CORE
For information on Fall & Injury Prevention, visit: [email or web address] OR  [email or web address] OR  https://www.cdc.gov/steadi/patient.html

## 2024-02-13 NOTE — SWALLOW VFSS/MBS ASSESSMENT ADULT - RECOMMENDED CONSISTENCY
Total Number Of Fractions: 25 Intro Statement (Will Not Render If Left Blank): The patient is undergoing superficial radiation therapy for skin cancer and presents for weekly evaluation and management.  Per protocol and as documented on the flow sheet, the patient was questioned as to subjective redness, pruritus, pain, drainage, fatigue, or any other symptoms.  Objectively, the radiation area was evaluated with regards to erythema, atrophy, scale, crusting, erosion, ulceration, edema, purpura, tenderness, warmth, drainage, and any other findings.  The plan was extensively reviewed including the dose, and dosing schedule.  The simulation and clinical setup was also reviewed as was the external and any internal shields and based on this review the appropriateness and sufficiency of treatment was determined. Render Additional Prescriptions In Note?: No Assessment: Appropriate reaction Fractions / Week Rx 2: 3 Fractions / Week Rx 3: 5 Patient Positioning: Supine Total Number Of Fractions Rx 3: 15 Detail Level: Detailed Total Dose (Optional-Please Include Units): 5168.0 cGy Number Of Treatment Days: 1 Day Of The Week Treatment Administered: Thursday Energy (Include Units): 70kV Treatment Time / Fractionation (Optional- Include Units): 0.35 min Fractionation Number: 10 Field Size (Applicator) Rx 2: 2.5 cm Simple Simulation Afterword Text Will Be Included With Simple Simulations (Indications............): The patient had a complete consultation regarding all applicable modalities for the treatment of their skin cancer and based on a variety of factors including the type of tumor, size, and location, the relevant medical history as well as local tissue factors, the functional status of the individual, the ability to perform necessary postoperative wound instructions and the need for simultaneous treatments as well as overall wound healing status, it was determined that the patient would begin radiation therapy treatment for skin cancer.  A full simulation and treatment device design was performed including the determination and formulation of appropriate simple and complex devices including lead shield of 0.762 mm thickness to form molded customized shielding to specifically correlate with the lesion size including treatment margin.  The custom lead shield is adequate to accommodate the appropriate applicator and provide adequate shielding around the treatment site.  The specific field applicator, shields, and devices both simple and complex as well as the specific patient setup is outlined below.  The patient was given a full consent for superficial radiation to both verbally and in writing and the full determination of patient's eligibility for treatment and selection is outlined on the patient eligibility and treatment selection form.  The specific superficial radiotherapy prescription was determined and was documented on the superficial radiotherapy prescription form.  A treatment calculation was also performed and documented on the treatment calculation form.  Based on the prescription, the patient was scheduled for a series of fractional treatments. Shielding Size (Optional- Include Units): 5.0 x 5.0 cm Fractionation Number (Evaluation): 9 Daily Fractionated Dose (Optional- Include Units) Rx 2: 253.11 cGy Custom Shielding Preamble Text Will Not Be Included With Simple Simulations (.......... X X Y Cm): A lead shield of 0.762 mm thickness is utilized to form a molded, custom shield with a Dose Per Fractionation In Cgy (Optional): 238.0 Energy (Optional-Please Include Units): 70 kV Simple Simulation Preamble Text Will Be Included With Simple Simulations (.......... Indications): Simple simulation was performed today for the following reasons: Total Dose (Optional-Please Include Units) Rx 2: 3796.65 cGy Cumulative Dose In Cgy (Optional): 2380.0 Functional Status: 1 (ambulatory, light activity) Field Size (Applicator): 5.0 cm Time Dose Fractionation (Optional- Include Units If Applicable): 103 Treatment Device Design After Initial Simulation Justification (Will Render If Bill For Treatment Devices = Yes): The patient is status post radiation simulation and is evaluated as to the use of additional devices for shielding and placement for radiation therapy. Custom Shielding Afterword Text Will Not Be Included With Simple Simulations (X X Y Cm............): port to correlate with the lesion size, including treatment margin. The custom lead shield is adequate to accommodate the appropriate applicator and provide adequate shielding around the treatment site. Additional shielding (as noted below) is used to protect sensitive, normal tissues. Treatment Margins In Cm: 0.5 Port Dimensions-X Axis In Cm: 4 Treatment Time / Fractionation (Optional- Include Units): 0.38min Computed Treatment Time In Min (Will Render The Same As Calculated Treatment Time If Left Blank): 0.35 Dose Per Fractionation In Cgy (Optional): 238.0 cGy Comments: On target, RTOG 1 Fractionation Number: 11 Total Dose (Optional-Please Include Units): 5950.0 cGy Treatment Time / Fractionation (Optional- Include Units) Rx 2: 0.33 min Time Dose Fractionation (Optional- Include Units If Applicable): 89 Bill For Radiation Treatment: Yes Shielding Size (Optional- Include Units) Rx 2: 2.0 x 2.0 cm Energy (Optional-Please Include Units) Rx 2: 50 kV Field Number: 2 Daily Fractionated Dose (Optional- Include Units): 258.4 cGy Shielding Size (Optional- Include Units): 4.0 x 4.0 cm Total Number Of Fractions: 20 Time Dose Fractionation (Optional- Include Units If Applicable) Rx 2: 65 1. Regular solids with thin liquids as tolerated Cumulative Dose In Cgy (Optional): 2638.4

## 2024-02-13 NOTE — OCCUPATIONAL THERAPY INITIAL EVALUATION ADULT - ADDITIONAL COMMENTS
as per pt report she lives at the [facility] and was Independent with ADL's and used a RW for ambulation   Pt has as RW and shower chair

## 2024-02-13 NOTE — DISCHARGE NOTE NURSING/CASE MANAGEMENT/SOCIAL WORK - NSSCCARECORD_GEN_ALL_CORE
Home Care Agency/Durable Medical Equipment Agency/Community [facility] Community Resource

## 2024-02-13 NOTE — SWALLOW VFSS/MBS ASSESSMENT ADULT - DIAGNOSTIC IMPRESSIONS
The pt presented with mild oral dysphagia marked by reduced lingual ROM/strength/coordination resulting in posterior loss of the bolus to the oropharynx for puree and soft and bite sized and to the hyopharynx for regular solids, thin liquids, mildly thick liquids, and moderately thick liquids.     Pharyngeal swallow function was judged to be WFL for solids trialed. No residue, penetration, aspiration viewed.     With thin liquids, mildly thick liquids, and moderately thick liquids, pharyngeal swallow function notable for decreased tongue base retraction, reduced hyolaryngeal excursion/elevation, and reduced pharyngeal contractility resulting in trace residue in the valleculae, pyriform sinuses, and posterior pharyngeal wall. No penetration or aspiration viewed. The pt presented with mild oral dysphagia marked by reduced lingual ROM/strength/coordination resulting in posterior loss of the bolus to the oropharynx for puree and soft and bite sized and to the hyopharynx for regular solids, thin liquids, mildly thick liquids, and moderately thick liquids.     Pharyngeal swallow function was judged to be WFL for solids trialed. No residue, penetration, or aspiration viewed.     With thin liquids, mildly thick liquids, and moderately thick liquids, pharyngeal swallow function notable for decreased tongue base retraction, reduced hyolaryngeal excursion/elevation, and reduced pharyngeal contractility resulting in trace residue in the valleculae, pyriform sinuses, and posterior pharyngeal wall. No penetration or aspiration viewed.

## 2024-02-13 NOTE — DISCHARGE NOTE NURSING/CASE MANAGEMENT/SOCIAL WORK - NSDPFAC_GEN_ALL_CORE
[facility] [place] [phone]/fax [phone].  [facility] @ [address] [place], [place] [address]

## 2024-02-13 NOTE — PROGRESS NOTE ADULT - SUBJECTIVE AND OBJECTIVE BOX
Chief Complaint: Weakness    Interval Events: No events overnight.    Review of Systems:  General: No fevers, chills, weight gain  Skin: No rashes, color changes  Cardiovascular: No chest pain, orthopnea  Respiratory: No shortness of breath, cough  Gastrointestinal: No nausea, abdominal pain  Genitourinary: No incontinence, pain with urination  Musculoskeletal: No pain, swelling, decreased range of motion  Neurological: No headache, +weakness  Psychiatric: No depression, anxiety  Endocrine: No weight gain, increased thirst  All other systems are comprehensively negative.    Physical Exam:  Vitals:        Vital Signs Last 24 Hrs  T(C): 37.2 (13 Feb 2024 05:21), Max: 37.2 (13 Feb 2024 05:21)  T(F): 99 (13 Feb 2024 05:21), Max: 99 (13 Feb 2024 05:21)  HR: 74 (13 Feb 2024 05:21) (69 - 84)  BP: 151/74 (13 Feb 2024 05:21) (138/66 - 162/73)  BP(mean): --  RR: 18 (13 Feb 2024 05:21) (18 - 18)  SpO2: 92% (13 Feb 2024 05:21) (92% - 98%)  Parameters below as of 13 Feb 2024 05:21  Patient On (Oxygen Delivery Method): room air  General: NAD  HEENT: MMM  Neck: No JVD, no carotid bruit  Lungs: CTAB  CV: RRR, nl S1/S2, no M/R/G  Abdomen: S/NT/ND, +BS  Extremities: No LE edema, no cyanosis  Neuro: AAOx3, non-focal  Skin: No rash    Labs:                        13.7   11.53 )-----------( 251      ( 12 Feb 2024 16:00 )             40.6     02-12    141  |  104  |  7   ----------------------------<  125<H>  2.9<LL>   |  26  |  0.90    Ca    9.1      12 Feb 2024 16:00  Phos  3.0     02-12  Mg     2.0     02-12    TPro  7.7  /  Alb  4.0  /  TBili  0.8  /  DBili  x   /  AST  20  /  ALT  14  /  AlkPhos  96  02-11        PT/INR - ( 11 Feb 2024 16:45 )   PT: 10.9 sec;   INR: 1.00 ratio         PTT - ( 11 Feb 2024 16:45 )  PTT:29.3 sec

## 2024-02-13 NOTE — PHYSICAL THERAPY INITIAL EVALUATION ADULT - MANUAL MUSCLE TESTING RESULTS, REHAB EVAL
bilateral shoulder flexion, elbow flexion, finger flexion/extension 5/5, bilateral knee flexion 2+/5, knee extension 2-/5, ankle df/pf 2+/5/grossly assessed due to

## 2024-02-13 NOTE — CARE COORDINATION ASSESSMENT. - PRO ARRIVE FROM
Pt is a resident of [facility] [phone]/fax [phone]. Pt stated she has been a resident for the approx the past 4 years./assisted living facility

## 2024-02-13 NOTE — PATIENT CHOICE NOTE. - NSPTCHOICENOTES_GEN_ALL_CORE
home care agency of [facility] [phone]/fax [phone]. CM will refer case accordingly.

## 2024-02-13 NOTE — PROGRESS NOTE ADULT - SUBJECTIVE AND OBJECTIVE BOX
PROGRESS NOTE  Patient is a 73y old  Female who presents with a chief complaint of inability to wall (13 Feb 2024 08:17)    Chart and available morning labs /imaging are reviewed electronically , urgent issues addressed . More information  is being added upon completion of rounds , when more information is collected and management discussed with consultants , medical staff and social service/case management on the floor   OVERNIGHT      HPI:  73-year-old female with history of multiple sclerosis, CVA, tremor, brought by ambulance from [facility] for evaluation of increasing weakness and inability to ambulate today.  Patient states she thinks she had a fever and had some vomiting and diarrhea over the past few days.  Poor historian cannot elaborate on her condition.  Her PCP is Dr. Bliss. (12 Feb 2024 11:57)    PAST MEDICAL & SURGICAL HISTORY:  Stroke      HTN (hypertension)      HLD (hyperlipidemia)      Multiple sclerosis          MEDICATIONS  (STANDING):  amLODIPine   Tablet 5 milliGRAM(s) Oral daily  aspirin enteric coated 81 milliGRAM(s) Oral daily  atorvastatin 20 milliGRAM(s) Oral at bedtime  baclofen 10 milliGRAM(s) Oral every 8 hours  cholecalciferol 1000 Unit(s) Oral daily  cyanocobalamin 1000 MICROGram(s) Oral daily  dextrose 5% + sodium chloride 0.45%. 1000 milliLiter(s) (50 mL/Hr) IV Continuous <Continuous>  enoxaparin Injectable 40 milliGRAM(s) SubCutaneous every 24 hours  fingolimod 0.5 milliGRAM(s) Oral daily  furosemide    Tablet 40 milliGRAM(s) Oral daily  metoprolol tartrate 25 milliGRAM(s) Oral two times a day  pantoprazole    Tablet 40 milliGRAM(s) Oral before breakfast  piperacillin/tazobactam IVPB.. 3.375 Gram(s) IV Intermittent every 8 hours  potassium chloride   Powder 40 milliEquivalent(s) Oral once  potassium chloride  10 mEq/100 mL IVPB 10 milliEquivalent(s) IV Intermittent every 1 hour    MEDICATIONS  (PRN):  acetaminophen     Tablet .. 650 milliGRAM(s) Oral every 6 hours PRN Temp greater or equal to 38C (100.4F), Mild Pain (1 - 3)      OBJECTIVE    T(C): 37.2 (02-13-24 @ 05:21), Max: 37.2 (02-13-24 @ 05:21)  HR: 74 (02-13-24 @ 05:21) (69 - 84)  BP: 151/74 (02-13-24 @ 05:21) (138/66 - 162/73)  RR: 18 (02-13-24 @ 05:21) (18 - 18)  SpO2: 92% (02-13-24 @ 05:21) (92% - 98%)  Wt(kg): --  I&O's Summary    12 Feb 2024 07:01  -  13 Feb 2024 07:00  --------------------------------------------------------  IN: 450 mL / OUT: 1450 mL / NET: -1000 mL          REVIEW OF SYSTEMS:  CONSTITUTIONAL: No fever, weight loss, or fatigue  EYES: No eye pain, visual disturbances, or discharge  ENMT:   No sinus or throat pain  NECK: No pain or stiffness  RESPIRATORY: No cough, wheezing, chills or hemoptysis; No shortness of breath  CARDIOVASCULAR: No chest pain, palpitations, dizziness, or leg swelling  GASTROINTESTINAL: No abdominal pain. No nausea, vomiting; No diarrhea or constipation. No melena or hematochezia.  GENITOURINARY: No dysuria, frequency, hematuria, or incontinence  NEUROLOGICAL: No headaches, memory loss, loss of strength, numbness, or tremors  SKIN: No itching, burning, rashes, or lesions   MUSCULOSKELETAL: No joint pain or swelling; No muscle, back, or extremity pain    PHYSICAL EXAM:  Appearance: NAD. VS past 24 hrs -as above   HEENT:   Moist oral mucosa. Conjunctiva clear b/l.   Neck : supple  Respiratory: Lungs CTAB.  Gastrointestinal:  Soft, nontender. No rebound. No rigidity. BS present	  Cardiovascular: RRR ,S1S2 present  Neurologic: Non-focal. Moving all extremities.  Extremities: No edema. No erythema. No calf tenderness.  Skin: No rashes, No ecchymoses, No cyanosis.	  wounds ,skin lesions-See skin assesment flow sheet   LABS:                        14.2   10.75 )-----------( 275      ( 13 Feb 2024 08:08 )             41.6     02-13    141  |  105  |  5<L>  ----------------------------<  135<H>  3.2<L>   |  24  |  0.81    Ca    9.3      13 Feb 2024 08:08  Phos  3.0     02-12  Mg     2.0     02-12    TPro  7.7  /  Alb  4.0  /  TBili  0.8  /  DBili  x   /  AST  20  /  ALT  14  /  AlkPhos  96  02-11    CAPILLARY BLOOD GLUCOSE        PT/INR - ( 11 Feb 2024 16:45 )   PT: 10.9 sec;   INR: 1.00 ratio         PTT - ( 11 Feb 2024 16:45 )  PTT:29.3 sec  Urinalysis Basic - ( 13 Feb 2024 08:08 )    Color: x / Appearance: x / SG: x / pH: x  Gluc: 135 mg/dL / Ketone: x  / Bili: x / Urobili: x   Blood: x / Protein: x / Nitrite: x   Leuk Esterase: x / RBC: x / WBC x   Sq Epi: x / Non Sq Epi: x / Bacteria: x        Culture - Blood (collected 11 Feb 2024 16:45)  Source: .Blood Blood  Preliminary Report (13 Feb 2024 01:03):    No growth at 24 hours    Culture - Urine (collected 11 Feb 2024 16:45)  Source: Catheterized Catheterized  Final Report (12 Feb 2024 20:09):    <10,000 CFU/mL Normal Urogenital Beatris    Culture - Blood (collected 11 Feb 2024 16:45)  Source: .Blood Blood  Preliminary Report (13 Feb 2024 01:03):    No growth at 24 hours      RADIOLOGY & ADDITIONAL TESTS:   reviewed elctronically  ASSESSMENT/PLAN: 	     PROGRESS NOTE  Patient is a 73y old  Female who presents with a chief complaint of inability to wall (13 Feb 2024 08:17)    Chart and available morning labs /imaging are reviewed electronically , urgent issues addressed . More information  is being added upon completion of rounds , when more information is collected and management discussed with consultants , medical staff and social service/case management on the floor   OVERNIGHT    No new issues reported by medical staff . All above noted Patient is resting in a bed comfortably .Confused ,poor mentation .No distress noted   HPI:  73-year-old female with history of multiple sclerosis, CVA, tremor, brought by ambulance from [facility] for evaluation of increasing weakness and inability to ambulate today.  Patient states she thinks she had a fever and had some vomiting and diarrhea over the past few days.  Poor historian cannot elaborate on her condition.  Her PCP is Dr. Bliss. (12 Feb 2024 11:57)    PAST MEDICAL & SURGICAL HISTORY:  Stroke      HTN (hypertension)      HLD (hyperlipidemia)      Multiple sclerosis          MEDICATIONS  (STANDING):  amLODIPine   Tablet 5 milliGRAM(s) Oral daily  aspirin enteric coated 81 milliGRAM(s) Oral daily  atorvastatin 20 milliGRAM(s) Oral at bedtime  baclofen 10 milliGRAM(s) Oral every 8 hours  cholecalciferol 1000 Unit(s) Oral daily  cyanocobalamin 1000 MICROGram(s) Oral daily  dextrose 5% + sodium chloride 0.45%. 1000 milliLiter(s) (50 mL/Hr) IV Continuous <Continuous>  enoxaparin Injectable 40 milliGRAM(s) SubCutaneous every 24 hours  fingolimod 0.5 milliGRAM(s) Oral daily  furosemide    Tablet 40 milliGRAM(s) Oral daily  metoprolol tartrate 25 milliGRAM(s) Oral two times a day  pantoprazole    Tablet 40 milliGRAM(s) Oral before breakfast  piperacillin/tazobactam IVPB.. 3.375 Gram(s) IV Intermittent every 8 hours  potassium chloride   Powder 40 milliEquivalent(s) Oral once  potassium chloride  10 mEq/100 mL IVPB 10 milliEquivalent(s) IV Intermittent every 1 hour    MEDICATIONS  (PRN):  acetaminophen     Tablet .. 650 milliGRAM(s) Oral every 6 hours PRN Temp greater or equal to 38C (100.4F), Mild Pain (1 - 3)      OBJECTIVE    T(C): 37.2 (02-13-24 @ 05:21), Max: 37.2 (02-13-24 @ 05:21)  HR: 74 (02-13-24 @ 05:21) (69 - 84)  BP: 151/74 (02-13-24 @ 05:21) (138/66 - 162/73)  RR: 18 (02-13-24 @ 05:21) (18 - 18)  SpO2: 92% (02-13-24 @ 05:21) (92% - 98%)  Wt(kg): --  I&O's Summary    12 Feb 2024 07:01  -  13 Feb 2024 07:00  --------------------------------------------------------  IN: 450 mL / OUT: 1450 mL / NET: -1000 mL          REVIEW OF SYSTEMS:  CONSTITUTIONAL: No fever, weight loss, or fatigue  EYES: No eye pain, visual disturbances, or discharge  ENMT:   No sinus or throat pain  NECK: No pain or stiffness  RESPIRATORY: No cough, wheezing, chills or hemoptysis; No shortness of breath  CARDIOVASCULAR: No chest pain, palpitations, dizziness, or leg swelling  GASTROINTESTINAL: No abdominal pain. No nausea, vomiting; No diarrhea or constipation. No melena or hematochezia.  GENITOURINARY: No dysuria, frequency, hematuria, or incontinence  NEUROLOGICAL: No headaches, memory loss, loss of strength, numbness, or tremors  SKIN: No itching, burning, rashes, or lesions   MUSCULOSKELETAL: No joint pain or swelling; No muscle, back, or extremity pain    PHYSICAL EXAM:  Appearance: NAD. VS past 24 hrs -as above   HEENT:   Moist oral mucosa. Conjunctiva clear b/l.   Neck : supple  Respiratory: Lungs CTAB.  Gastrointestinal:  Soft, nontender. No rebound. No rigidity. BS present	  Cardiovascular: RRR ,S1S2 present  Neurologic: Non-focal. Moving all extremities.  Extremities: No edema. No erythema. No calf tenderness.  Skin: No rashes, No ecchymoses, No cyanosis.	  wounds ,skin lesions-See skin assesment flow sheet   LABS:                        14.2   10.75 )-----------( 275      ( 13 Feb 2024 08:08 )             41.6     02-13    141  |  105  |  5<L>  ----------------------------<  135<H>  3.2<L>   |  24  |  0.81    Ca    9.3      13 Feb 2024 08:08  Phos  3.0     02-12  Mg     2.0     02-12    TPro  7.7  /  Alb  4.0  /  TBili  0.8  /  DBili  x   /  AST  20  /  ALT  14  /  AlkPhos  96  02-11    CAPILLARY BLOOD GLUCOSE        PT/INR - ( 11 Feb 2024 16:45 )   PT: 10.9 sec;   INR: 1.00 ratio         PTT - ( 11 Feb 2024 16:45 )  PTT:29.3 sec  Urinalysis Basic - ( 13 Feb 2024 08:08 )    Color: x / Appearance: x / SG: x / pH: x  Gluc: 135 mg/dL / Ketone: x  / Bili: x / Urobili: x   Blood: x / Protein: x / Nitrite: x   Leuk Esterase: x / RBC: x / WBC x   Sq Epi: x / Non Sq Epi: x / Bacteria: x        Culture - Blood (collected 11 Feb 2024 16:45)  Source: .Blood Blood  Preliminary Report (13 Feb 2024 01:03):    No growth at 24 hours    Culture - Urine (collected 11 Feb 2024 16:45)  Source: Catheterized Catheterized  Final Report (12 Feb 2024 20:09):    <10,000 CFU/mL Normal Urogenital Beatris    Culture - Blood (collected 11 Feb 2024 16:45)  Source: .Blood Blood  Preliminary Report (13 Feb 2024 01:03):    No growth at 24 hours      RADIOLOGY & ADDITIONAL TESTS:   reviewed elctronically  ASSESSMENT/PLAN: 	    25 minutes aggregate time was spent on this visit, 50% visit time spent in care co-ordination with other attendings and counselling patient .I have discussed care plan with patient / HCP/family member ,who expressed understanding of problems treatment and their effect and side effects, alternatives in details. I have asked if they have any questions and concerns and appropriately addressed them to best of my ability.

## 2024-02-13 NOTE — DISCHARGE NOTE NURSING/CASE MANAGEMENT/SOCIAL WORK - NSSCNAMETXT_GEN_ALL_CORE
[facility] [phone]/fax [phone]. Home care agency will reach out to you within 24-72 hours of your discharge to schedule home care visit/eval appointment with you. Please call agency for any queries regarding home care services

## 2024-02-13 NOTE — OCCUPATIONAL THERAPY INITIAL EVALUATION ADULT - ADL RETRAINING, OT EVAL
Patient will dress upper body with set-up within 2-3 sessions Patient will dress lower body with minimal assistance, AE as needed within 2-3 sessions.

## 2024-02-13 NOTE — OCCUPATIONAL THERAPY INITIAL EVALUATION ADULT - RANGE OF MOTION EXAMINATION, UPPER EXTREMITY
Bed: 07  Expected date:   Expected time:   Means of arrival:   Comments:  UG 68F vomiting/ diarrhea   4/5 MMT bilateral UE/bilateral UE Active ROM was WFL  (within functional limits)

## 2024-02-13 NOTE — DISCHARGE NOTE NURSING/CASE MANAGEMENT/SOCIAL WORK - NSDCPELOVENOXFU_GEN_ALL_CORE
no
Go for blood tests as directed. Your doctor will do lab tests at regular visits to monitor the effects of this medicine. Please follow up with your doctor and keep your health care provider appointments

## 2024-02-13 NOTE — PROGRESS NOTE ADULT - SUBJECTIVE AND OBJECTIVE BOX
CAROLINA OROPEZA is a 73yFemale , patient examined and chart reviewed.     INTERVAL HPI/ OVERNIGHT EVENTS:   Feeling better. Afebrile.    PAST MEDICAL & SURGICAL HISTORY:  Stroke  HTN (hypertension)  HLD (hyperlipidemia)  Multiple sclerosis    For details regarding the patient's social history, family history, and other miscellaneous elements, please refer the initial infectious diseases consultation and/or the admitting history and physical examination for this admission.    ROS:  Unable to obtain due to : poor historian      No Known Allergies      Current inpatient medications :    ANTIBIOTICS/RELEVANT:  Mayzent (siponimod) 2mg 1 Tablet(s) 1 Tablet(s) Oral daily  piperacillin/tazobactam IVPB.. 3.375 Gram(s) IV Intermittent every 8 hours      acetaminophen     Tablet .. 650 milliGRAM(s) Oral every 6 hours PRN  amLODIPine   Tablet 5 milliGRAM(s) Oral daily  aspirin enteric coated 81 milliGRAM(s) Oral daily  atorvastatin 20 milliGRAM(s) Oral at bedtime  baclofen 10 milliGRAM(s) Oral every 8 hours  cholecalciferol 1000 Unit(s) Oral daily  cyanocobalamin 1000 MICROGram(s) Oral daily  dextrose 5% + sodium chloride 0.45%. 1000 milliLiter(s) IV Continuous <Continuous>  enoxaparin Injectable 40 milliGRAM(s) SubCutaneous every 24 hours  furosemide    Tablet 40 milliGRAM(s) Oral daily  metoprolol tartrate 25 milliGRAM(s) Oral two times a day  nystatin Powder 1 Application(s) Topical two times a day  pantoprazole    Tablet 40 milliGRAM(s) Oral before breakfast      Objective:    02-12 @ 07:01  -  02-13 @ 07:00  --------------------------------------------------------  IN: 450 mL / OUT: 1450 mL / NET: -1000 mL      T(C): 36.9 (02-13-24 @ 14:48), Max: 37.2 (02-13-24 @ 05:21)  HR: 64 (02-13-24 @ 14:48) (60 - 74)  BP: 157/73 (02-13-24 @ 14:48) (135/73 - 162/73)  RR: 18 (02-13-24 @ 14:48) (18 - 18)  SpO2: 94% (02-13-24 @ 14:48) (92% - 94%)    Physical Exam:  General: no acute distress  Neck: supple, trachea midline  Lungs: decreased no wheeze/rhonchi  Cardiovascular: regular rate and rhythm, S1 S2  Abdomen: soft, nontender,  bowel sounds normal  Neurological: awake alert  Skin: no rash  Extremities: noedema        LABS:                          14.2   10.75 )-----------( 275      ( 13 Feb 2024 08:08 )             41.6       02-13    141  |  105  |  5<L>  ----------------------------<  135<H>  3.2<L>   |  24  |  0.81    Ca    9.3      13 Feb 2024 08:08  Phos  3.0     02-12  Mg     2.0     02-12    MICROBIOLOGY:    Culture - Blood (collected 11 Feb 2024 16:45)  Source: .Blood Blood  Preliminary Report (13 Feb 2024 01:03):    No growth at 24 hours    Culture - Urine (collected 11 Feb 2024 16:45)  Source: Catheterized Catheterized  Final Report (12 Feb 2024 20:09):    <10,000 CFU/mL Normal Urogenital Beatris    Culture - Blood (collected 11 Feb 2024 16:45)  Source: .Blood Blood  Preliminary Report (13 Feb 2024 01:03):    No growth at 24 hours      RADIOLOGY & ADDITIONAL STUDIES:  ACC: [id number] EXAM:  CT CHEST   ORDERED BY: VIRI DOS SANTOS     PROCEDURE DATE:  02/11/2024          INTERPRETATION:  EXAMINATION: CT CHEST    CLINICAL INDICATION: Pneumonia.    TECHNIQUE: Noncontrast CT of the chest was obtained.    COMPARISON: None.    FINDINGS:    AIRWAYS AND LUNGS: The central tracheobronchial tree is patent.  Patchy   left lung opacities. Right lower lobe calcified granuloma.    MEDIASTINUM AND PLEURA: There are no enlarged mediastinal, hilar or   axillary lymph nodes. The visualized portion of the thyroid gland is   unremarkable. There is no pleural effusion. There is no pneumothorax.    HEART AND VESSELS: There is mild cardiomegaly.  There are atherosclerotic   calcifications of the aorta and coronary arteries.  There is no   pericardial effusion.    UPPER ABDOMEN: Images of the upper abdomen demonstrate cholelithiasis.    BONES AND SOFT TISSUES: The bones are unremarkable.  The soft tissues are   unremarkable.      IMPRESSION:  Multifocal pneumonia    Assessment :   74YO F PMH multiple sclerosis, CVA, tremor, resident of [facility] who presented to the hospital with c/o  increasing weakness and inability to ambulate. Subjective fever and had some vomiting and diarrhea. Pt is a poor historian. In ED CT chest showed multifocal pna. WBC 15K  Asp pna  WBC improved  MBS mild dysphagia  Cultures NGTD  Afebrile  Clinically better    Plan :   Cont Zosyn x 5 days  Trend temps and cbc  Pulm toileting  Asp precautions  Stable from ID standpoint    Continue with present regiment.  Appropriate use of antibiotics and adverse effects reviewed.        > 35 minutes were spent in direct patient care reviewing notes, medications ,labs data/ imaging , discussion with multidisciplinary team.    Thank you for allowing me to participate in care of your patient .    Evangelista Cross MD  Infectious Disease  [phone] [Good] : ~his/her~  mood as  good [No falls in past year] : Patient reported no falls in the past year [2] : 2) Feeling down, depressed, or hopeless for more than half of the days (2) [Patient declined mammogram] : Patient declined mammogram [Patient declined PAP Smear] : Patient declined PAP Smear [Patient declined colonoscopy] : Patient declined colonoscopy [HIV Test offered] : HIV Test offered [Hepatitis C test offered] : Hepatitis C test offered [With Family] : lives with family [# Of Children ___] : has [unfilled] children [Fully functional (bathing, dressing, toileting, transferring, walking, feeding)] : Fully functional (bathing, dressing, toileting, transferring, walking, feeding) [Fully functional (using the telephone, shopping, preparing meals, housekeeping, doing laundry, using] : Fully functional and needs no help or supervision to perform IADLs (using the telephone, shopping, preparing meals, housekeeping, doing laundry, using transportation, managing medications and managing finances) [Smoke Detector] : smoke detector [Seat Belt] :  uses seat belt [Discussed at today's visit] : Advance Directives Discussed at today's visit [] : No [[id number]] : 4 [[id number]] : 11 [de-identified] : lives with sister

## 2024-02-14 ENCOUNTER — TRANSCRIPTION ENCOUNTER (OUTPATIENT)
Age: 74
End: 2024-02-14

## 2024-02-14 LAB
ANION GAP SERPL CALC-SCNC: 13 MMOL/L — SIGNIFICANT CHANGE UP (ref 5–17)
BUN SERPL-MCNC: 10 MG/DL — SIGNIFICANT CHANGE UP (ref 7–23)
CALCIUM SERPL-MCNC: 9.3 MG/DL — SIGNIFICANT CHANGE UP (ref 8.4–10.5)
CHLORIDE SERPL-SCNC: 104 MMOL/L — SIGNIFICANT CHANGE UP (ref 96–108)
CO2 SERPL-SCNC: 23 MMOL/L — SIGNIFICANT CHANGE UP (ref 22–31)
CREAT SERPL-MCNC: 1.03 MG/DL — SIGNIFICANT CHANGE UP (ref 0.5–1.3)
EGFR: 58 ML/MIN/1.73M2 — LOW
GLUCOSE SERPL-MCNC: 129 MG/DL — HIGH (ref 70–99)
HCT VFR BLD CALC: 41.1 % — SIGNIFICANT CHANGE UP (ref 34.5–45)
HGB BLD-MCNC: 13.6 G/DL — SIGNIFICANT CHANGE UP (ref 11.5–15.5)
LEGIONELLA AG UR QL: NEGATIVE — SIGNIFICANT CHANGE UP
MCHC RBC-ENTMCNC: 32.1 PG — SIGNIFICANT CHANGE UP (ref 27–34)
MCHC RBC-ENTMCNC: 33.1 GM/DL — SIGNIFICANT CHANGE UP (ref 32–36)
MCV RBC AUTO: 96.9 FL — SIGNIFICANT CHANGE UP (ref 80–100)
NRBC # BLD: 0 /100 WBCS — SIGNIFICANT CHANGE UP (ref 0–0)
PLATELET # BLD AUTO: 295 K/UL — SIGNIFICANT CHANGE UP (ref 150–400)
POTASSIUM SERPL-MCNC: 3.6 MMOL/L — SIGNIFICANT CHANGE UP (ref 3.5–5.3)
POTASSIUM SERPL-SCNC: 3.6 MMOL/L — SIGNIFICANT CHANGE UP (ref 3.5–5.3)
RBC # BLD: 4.24 M/UL — SIGNIFICANT CHANGE UP (ref 3.8–5.2)
RBC # FLD: 12.4 % — SIGNIFICANT CHANGE UP (ref 10.3–14.5)
S PNEUM AG UR QL: NEGATIVE — SIGNIFICANT CHANGE UP
SODIUM SERPL-SCNC: 140 MMOL/L — SIGNIFICANT CHANGE UP (ref 135–145)
WBC # BLD: 9.85 K/UL — SIGNIFICANT CHANGE UP (ref 3.8–10.5)
WBC # FLD AUTO: 9.85 K/UL — SIGNIFICANT CHANGE UP (ref 3.8–10.5)

## 2024-02-14 RX ORDER — CHOLECALCIFEROL (VITAMIN D3) 125 MCG
1000 CAPSULE ORAL
Qty: 0 | Refills: 0 | DISCHARGE
Start: 2024-02-14

## 2024-02-14 RX ORDER — ENOXAPARIN SODIUM 100 MG/ML
40 INJECTION SUBCUTANEOUS
Qty: 0 | Refills: 0 | DISCHARGE
Start: 2024-02-14

## 2024-02-14 RX ORDER — NYSTATIN CREAM 100000 [USP'U]/G
1 CREAM TOPICAL
Qty: 0 | Refills: 0 | DISCHARGE
Start: 2024-02-14

## 2024-02-14 RX ORDER — CHOLECALCIFEROL (VITAMIN D3) 125 MCG
0 CAPSULE ORAL
Refills: 0 | DISCHARGE

## 2024-02-14 RX ORDER — PIPERACILLIN AND TAZOBACTAM 4; .5 G/20ML; G/20ML
3.38 INJECTION, POWDER, LYOPHILIZED, FOR SOLUTION INTRAVENOUS
Qty: 0 | Refills: 0 | DISCHARGE
Start: 2024-02-14

## 2024-02-14 RX ORDER — PREGABALIN 225 MG/1
1 CAPSULE ORAL
Qty: 0 | Refills: 0 | DISCHARGE
Start: 2024-02-14

## 2024-02-14 RX ORDER — AMLODIPINE BESYLATE 2.5 MG/1
1 TABLET ORAL
Refills: 0 | DISCHARGE

## 2024-02-14 RX ORDER — METOPROLOL TARTRATE 50 MG
1 TABLET ORAL
Qty: 0 | Refills: 0 | DISCHARGE
Start: 2024-02-14

## 2024-02-14 RX ORDER — FUROSEMIDE 40 MG
1 TABLET ORAL
Qty: 0 | Refills: 0 | DISCHARGE
Start: 2024-02-14

## 2024-02-14 RX ORDER — ASPIRIN/CALCIUM CARB/MAGNESIUM 324 MG
1 TABLET ORAL
Qty: 0 | Refills: 0 | DISCHARGE
Start: 2024-02-14

## 2024-02-14 RX ORDER — METOPROLOL TARTRATE 50 MG
1 TABLET ORAL
Refills: 0 | DISCHARGE

## 2024-02-14 RX ORDER — AMLODIPINE BESYLATE 2.5 MG/1
1 TABLET ORAL
Qty: 0 | Refills: 0 | DISCHARGE
Start: 2024-02-14

## 2024-02-14 RX ORDER — FUROSEMIDE 40 MG
1 TABLET ORAL
Refills: 0 | DISCHARGE

## 2024-02-14 RX ADMIN — Medication 25 MILLIGRAM(S): at 05:16

## 2024-02-14 RX ADMIN — Medication 40 MILLIGRAM(S): at 05:17

## 2024-02-14 RX ADMIN — AMLODIPINE BESYLATE 5 MILLIGRAM(S): 2.5 TABLET ORAL at 05:17

## 2024-02-14 RX ADMIN — NYSTATIN CREAM 1 APPLICATION(S): 100000 CREAM TOPICAL at 17:08

## 2024-02-14 RX ADMIN — ATORVASTATIN CALCIUM 20 MILLIGRAM(S): 80 TABLET, FILM COATED ORAL at 21:45

## 2024-02-14 RX ADMIN — Medication 1000 UNIT(S): at 11:14

## 2024-02-14 RX ADMIN — Medication 10 MILLIGRAM(S): at 21:45

## 2024-02-14 RX ADMIN — NYSTATIN CREAM 1 APPLICATION(S): 100000 CREAM TOPICAL at 05:17

## 2024-02-14 RX ADMIN — ENOXAPARIN SODIUM 40 MILLIGRAM(S): 100 INJECTION SUBCUTANEOUS at 21:45

## 2024-02-14 RX ADMIN — SODIUM CHLORIDE 50 MILLILITER(S): 9 INJECTION, SOLUTION INTRAVENOUS at 09:09

## 2024-02-14 RX ADMIN — PANTOPRAZOLE SODIUM 40 MILLIGRAM(S): 20 TABLET, DELAYED RELEASE ORAL at 05:17

## 2024-02-14 RX ADMIN — PIPERACILLIN AND TAZOBACTAM 25 GRAM(S): 4; .5 INJECTION, POWDER, LYOPHILIZED, FOR SOLUTION INTRAVENOUS at 13:06

## 2024-02-14 RX ADMIN — PREGABALIN 1000 MICROGRAM(S): 225 CAPSULE ORAL at 11:14

## 2024-02-14 RX ADMIN — PIPERACILLIN AND TAZOBACTAM 25 GRAM(S): 4; .5 INJECTION, POWDER, LYOPHILIZED, FOR SOLUTION INTRAVENOUS at 05:16

## 2024-02-14 RX ADMIN — Medication 81 MILLIGRAM(S): at 11:14

## 2024-02-14 RX ADMIN — Medication 10 MILLIGRAM(S): at 13:06

## 2024-02-14 RX ADMIN — Medication 10 MILLIGRAM(S): at 05:17

## 2024-02-14 RX ADMIN — PIPERACILLIN AND TAZOBACTAM 25 GRAM(S): 4; .5 INJECTION, POWDER, LYOPHILIZED, FOR SOLUTION INTRAVENOUS at 21:46

## 2024-02-14 RX ADMIN — Medication 25 MILLIGRAM(S): at 17:10

## 2024-02-14 NOTE — DISCHARGE NOTE PROVIDER - NSDCMRMEDTOKEN_GEN_ALL_CORE_FT
acetaminophen 500 mg oral tablet: 2 tab(s) orally 4 times a day  amLODIPine 5 mg oral tablet: 1 tab(s) orally once a day  aspirin 81 mg oral delayed release tablet: 1 tab(s) orally once a day  atorvastatin 20 mg oral tablet: 1 tab(s) orally once a day (at bedtime)  baclofen 10 mg oral tablet: 1 tab(s) orally 3 times a day  Boniva 150 mg oral tablet: 1 tab(s) orally once a month  cholecalciferol oral tablet: 1000 unit(s) orally once a day  cyanocobalamin 1000 mcg oral tablet: 1 tab(s) orally once a day  enoxaparin 40 mg/0.4 mL injectable solution: 40 milligram(s) subcutaneous once a day  furosemide 40 mg oral tablet: 1 tab(s) orally once a day  Mayzent 2 mg oral tablet: 1 tab(s) orally once a day  metoprolol tartrate 25 mg oral tablet: 1 tab(s) orally 2 times a day  nystatin 100,000 units/g topical powder: 1 Apply topically to affected area 2 times a day  piperacillin-tazobactam 3 g-0.375 g intravenous injection: 3.375 gram(s) intravenous every 8 hours x 2 days  Protonix 20 mg oral delayed release tablet: 1 tab(s) orally once a day   acetaminophen 500 mg oral tablet: 2 tab(s) orally 4 times a day  amLODIPine 5 mg oral tablet: 1 tab(s) orally once a day  aspirin 81 mg oral delayed release tablet: 1 tab(s) orally once a day  atorvastatin 20 mg oral tablet: 1 tab(s) orally once a day (at bedtime)  baclofen 10 mg oral tablet: 1 tab(s) orally 3 times a day  Boniva 150 mg oral tablet: 1 tab(s) orally once a month  cholecalciferol oral tablet: 1000 unit(s) orally once a day  cyanocobalamin 1000 mcg oral tablet: 1 tab(s) orally once a day  enoxaparin 40 mg/0.4 mL injectable solution: 40 milligram(s) subcutaneous once a day  furosemide 40 mg oral tablet: 1 tab(s) orally once a day  Mayzent 2 mg oral tablet: 1 tab(s) orally once a day  metoprolol tartrate 25 mg oral tablet: 1 tab(s) orally 2 times a day  nystatin 100,000 units/g topical powder: 1 Apply topically to affected area 2 times a day  piperacillin-tazobactam 3 g-0.375 g intravenous injection: 3.375 gram(s) intravenous every 8 hours x 1 day  Protonix 20 mg oral delayed release tablet: 1 tab(s) orally once a day

## 2024-02-14 NOTE — PROGRESS NOTE ADULT - SUBJECTIVE AND OBJECTIVE BOX
CHIEF COMPLAINT/ REASON FOR VISIT  .. Patient was seen to address the  issue listed under PROBLEM LIST which is located toward bottom of this note     CAROLINA OROPEZA    SY 1EST 89 W1    Allergies    No Known Allergies    Intolerances        PAST MEDICAL & SURGICAL HISTORY:  Stroke      HTN (hypertension)      HLD (hyperlipidemia)      Multiple sclerosis          FAMILY HISTORY:      Home Medications:  acetaminophen 500 mg oral tablet: 2 tab(s) orally 4 times a day (11 Feb 2024 19:25)  amLODIPine 5 mg oral tablet: 1 tab(s) orally once a day htn (11 Feb 2024 19:25)  atorvastatin 20 mg oral tablet: 1 tab(s) orally once a day (at bedtime) (11 Feb 2024 19:25)  baclofen 10 mg oral tablet: 1 tab(s) orally 3 times a day (11 Feb 2024 19:26)  Boniva 150 mg oral tablet: 1 tab(s) orally once a month (11 Feb 2024 19:26)  furosemide 40 mg oral tablet: 1 tab(s) orally once a day (11 Feb 2024 19:26)  Mayzent 2 mg oral tablet: 1 tab(s) orally once a day (11 Feb 2024 19:26)  metoprolol tartrate 25 mg oral tablet: 1 tab(s) orally 2 times a day (11 Feb 2024 19:26)  Protonix 20 mg oral delayed release tablet: 1 tab(s) orally once a day (11 Feb 2024 19:26)  vit b12 1000mcq: everyday (11 Feb 2024 19:26)  vitamin d3: 1000 IU tab (11 Feb 2024 19:26)      MEDICATIONS  (STANDING):  amLODIPine   Tablet 5 milliGRAM(s) Oral daily  aspirin enteric coated 81 milliGRAM(s) Oral daily  atorvastatin 20 milliGRAM(s) Oral at bedtime  baclofen 10 milliGRAM(s) Oral every 8 hours  cholecalciferol 1000 Unit(s) Oral daily  cyanocobalamin 1000 MICROGram(s) Oral daily  dextrose 5% + sodium chloride 0.45%. 1000 milliLiter(s) (50 mL/Hr) IV Continuous <Continuous>  enoxaparin Injectable 40 milliGRAM(s) SubCutaneous every 24 hours  furosemide    Tablet 40 milliGRAM(s) Oral daily  Mayzent (siponimod) 2mg 1 Tablet(s) 1 Tablet(s) Oral daily  metoprolol tartrate 25 milliGRAM(s) Oral two times a day  nystatin Powder 1 Application(s) Topical two times a day  pantoprazole    Tablet 40 milliGRAM(s) Oral before breakfast  piperacillin/tazobactam IVPB.. 3.375 Gram(s) IV Intermittent every 8 hours    MEDICATIONS  (PRN):  acetaminophen     Tablet .. 650 milliGRAM(s) Oral every 6 hours PRN Temp greater or equal to 38C (100.4F), Mild Pain (1 - 3)              Vital Signs Last 24 Hrs  T(C): 37.5 (14 Feb 2024 05:12), Max: 37.5 (14 Feb 2024 05:12)  T(F): 99.5 (14 Feb 2024 05:12), Max: 99.5 (14 Feb 2024 05:12)  HR: 63 (14 Feb 2024 05:12) (60 - 64)  BP: 122/67 (14 Feb 2024 05:12) (122/67 - 157/73)  BP(mean): --  RR: 18 (14 Feb 2024 05:12) (18 - 18)  SpO2: 92% (14 Feb 2024 05:12) (92% - 94%)    Parameters below as of 13 Feb 2024 08:27  Patient On (Oxygen Delivery Method): room air          02-13-24 @ 07:01  -  02-14-24 @ 07:00  --------------------------------------------------------  IN: 0 mL / OUT: 400 mL / NET: -400 mL              LABS:                        14.2   10.75 )-----------( 275      ( 13 Feb 2024 08:08 )             41.6     02-13    141  |  105  |  5<L>  ----------------------------<  135<H>  3.2<L>   |  24  |  0.81    Ca    9.3      13 Feb 2024 08:08  Phos  3.0     02-12  Mg     2.0     02-12        Urinalysis Basic - ( 13 Feb 2024 08:08 )    Color: x / Appearance: x / SG: x / pH: x  Gluc: 135 mg/dL / Ketone: x  / Bili: x / Urobili: x   Blood: x / Protein: x / Nitrite: x   Leuk Esterase: x / RBC: x / WBC x   Sq Epi: x / Non Sq Epi: x / Bacteria: x            WBC:  WBC Count: 10.75 K/uL (02-13 @ 08:08)  WBC Count: 11.53 K/uL (02-12 @ 16:00)  WBC Count: 15.46 K/uL (02-11 @ 16:45)      MICROBIOLOGY:  RECENT CULTURES:  02-11 .Blood Blood XXXX XXXX   No growth at 48 Hours                    Sodium:  Sodium: 141 mmol/L (02-13 @ 08:08)  Sodium: 141 mmol/L (02-12 @ 16:00)  Sodium: 141 mmol/L (02-11 @ 16:45)      0.81 mg/dL 02-13 @ 08:08  0.90 mg/dL 02-12 @ 16:00  1.22 mg/dL 02-11 @ 16:45      Hemoglobin:  Hemoglobin: 14.2 g/dL (02-13 @ 08:08)  Hemoglobin: 13.7 g/dL (02-12 @ 16:00)  Hemoglobin: 13.8 g/dL (02-11 @ 16:45)      Platelets: Platelet Count - Automated: 275 K/uL (02-13 @ 08:08)  Platelet Count - Automated: 251 K/uL (02-12 @ 16:00)  Platelet Count - Automated: 269 K/uL (02-11 @ 16:45)          Urinalysis Basic - ( 13 Feb 2024 08:08 )    Color: x / Appearance: x / SG: x / pH: x  Gluc: 135 mg/dL / Ketone: x  / Bili: x / Urobili: x   Blood: x / Protein: x / Nitrite: x   Leuk Esterase: x / RBC: x / WBC x   Sq Epi: x / Non Sq Epi: x / Bacteria: x        RADIOLOGY & ADDITIONAL STUDIES:      MICROBIOLOGY:  RECENT CULTURES:  02-11 .Blood Blood XXXX XXXX   No growth at 48 Hours

## 2024-02-14 NOTE — PROGRESS NOTE ADULT - PROBLEM SELECTOR PROBLEM 7
Elevated WBCs
Elevated WBCs
Xeljanz Counseling: I discussed with the patient the risks of Xeljanz therapy including increased risk of infection, liver issues, headache, diarrhea, or cold symptoms. Live vaccines should be avoided. They were instructed to call if they have any problems.

## 2024-02-14 NOTE — CONSULT NOTE ADULT - SUBJECTIVE AND OBJECTIVE BOX
CHIEF COMPLAINT/ REASON FOR VISIT  .. Patient was seen to address the  issue listed under PROBLEM LIST which is located toward bottom of this note     CAROLINA OROPEZA    SY EDIP 11    Allergies    No Known Allergies    Intolerances        PAST MEDICAL & SURGICAL HISTORY:  Stroke      HTN (hypertension)      HLD (hyperlipidemia)          FAMILY HISTORY:      Home Medications:  Aspir 81 oral delayed release tablet: 1 tab(s) orally once a day (2018 15:29)  Gilenya 0.5 mg oral capsule: 1 cap(s) orally once a day (2018 15:29)      MEDICATIONS  (STANDING):    MEDICATIONS  (PRN):              Vital Signs Last 24 Hrs  T(C): 37.1 (2024 16:12), Max: 37.1 (2024 16:12)  T(F): 98.8 (2024 16:12), Max: 98.8 (2024 16:12)  HR: 66 (2024 17:58) (66 - 67)  BP: 128/70 (2024 17:58) (128/70 - 132/76)  BP(mean): --  RR: 18 (2024 17:58) (18 - 20)  SpO2: 99% (2024 17:58) (99% - 99%)    Parameters below as of 2024 17:58  Patient On (Oxygen Delivery Method): room air                  LABS:                        13.8   15.46 )-----------( 269      ( 2024 16:45 )             41.4     02-11    141  |  100  |  18  ----------------------------<  128<H>  3.0<L>   |  25  |  1.22    Ca    8.8      2024 16:45    TPro  7.7  /  Alb  4.0  /  TBili  0.8  /  DBili  x   /  AST  20  /  ALT  14  /  AlkPhos  96  02-11    PT/INR - ( 2024 16:45 )   PT: 10.9 sec;   INR: 1.00 ratio         PTT - ( 2024 16:45 )  PTT:29.3 sec  Urinalysis Basic - ( 2024 16:45 )    Color: Yellow / Appearance: Clear / S.009 / pH: x  Gluc: 128 mg/dL / Ketone: Negative mg/dL  / Bili: Negative / Urobili: 0.2 mg/dL   Blood: x / Protein: Negative mg/dL / Nitrite: Negative   Leuk Esterase: Negative / RBC: x / WBC x   Sq Epi: x / Non Sq Epi: x / Bacteria: x            WBC:  WBC Count: 15.46 K/uL ( @ 16:45)      MICROBIOLOGY:  RECENT CULTURES:              PT/INR - ( 2024 16:45 )   PT: 10.9 sec;   INR: 1.00 ratio         PTT - ( 2024 16:45 )  PTT:29.3 sec    Sodium:  Sodium: 141 mmol/L ( @ 16:45)      1.22 mg/dL  @ 16:45      Hemoglobin:  Hemoglobin: 13.8 g/dL ( @ 16:45)      Platelets: Platelet Count - Automated: 269 K/uL ( @ 16:45)      LIVER FUNCTIONS - ( 2024 16:45 )  Alb: 4.0 g/dL / Pro: 7.7 g/dL / ALK PHOS: 96 U/L / ALT: 14 U/L / AST: 20 U/L / GGT: x             Urinalysis Basic - ( 2024 16:45 )    Color: Yellow / Appearance: Clear / S.009 / pH: x  Gluc: 128 mg/dL / Ketone: Negative mg/dL  / Bili: Negative / Urobili: 0.2 mg/dL   Blood: x / Protein: Negative mg/dL / Nitrite: Negative   Leuk Esterase: Negative / RBC: x / WBC x   Sq Epi: x / Non Sq Epi: x / Bacteria: x        RADIOLOGY & ADDITIONAL STUDIES:      MICROBIOLOGY:  RECENT CULTURES:          
    HPI:  72YO F PMH multiple sclerosis, CVA, tremor, resident of [facility] who presented to the hospital with c/o  increasing weakness and inability to ambulate. Subjective fever and had some vomiting and diarrhea. Pt is a poor historian. In ED CT chest showed multifocal pna. WBC 15K No fever n/v/d.    Infectious Disease consult was called to evaluate pt and for antibiotic management,      Past Medical & Surgical Hx:  PAST MEDICAL & SURGICAL HISTORY:  Stroke  HTN (hypertension)  HLD (hyperlipidemia)  Multiple sclerosis        Social History--  EtOH: denies   Tobacco: denies   Drug Use: denies     FAMILY HISTORY:  Noncontributory    Allergies  No Known Allergies    Intolerances  NONE      Home Medications:  acetaminophen 500 mg oral tablet: 2 tab(s) orally 4 times a day (11 Feb 2024 19:25)  amLODIPine 5 mg oral tablet: 1 tab(s) orally once a day htn (11 Feb 2024 19:25)  atorvastatin 20 mg oral tablet: 1 tab(s) orally once a day (at bedtime) (11 Feb 2024 19:25)  baclofen 10 mg oral tablet: 1 tab(s) orally 3 times a day (11 Feb 2024 19:26)  Boniva 150 mg oral tablet: 1 tab(s) orally once a month (11 Feb 2024 19:26)  furosemide 40 mg oral tablet: 1 tab(s) orally once a day (11 Feb 2024 19:26)  Mayzent 2 mg oral tablet: 1 tab(s) orally once a day (11 Feb 2024 19:26)  metoprolol tartrate 25 mg oral tablet: 1 tab(s) orally 2 times a day (11 Feb 2024 19:26)  Protonix 20 mg oral delayed release tablet: 1 tab(s) orally once a day (11 Feb 2024 19:26)  vit b12 1000mcq: everyday (11 Feb 2024 19:26)  vitamin d3: 1000 IU tab (11 Feb 2024 19:26)      Current Inpatient Medications :    ANTIBIOTICS:   piperacillin/tazobactam IVPB.. 3.375 Gram(s) IV Intermittent every 8 hours      OTHER RELEVANT MEDICATIONS :  acetaminophen     Tablet .. 650 milliGRAM(s) Oral every 6 hours PRN  aspirin enteric coated 81 milliGRAM(s) Oral daily  atorvastatin 20 milliGRAM(s) Oral at bedtime  baclofen 10 milliGRAM(s) Oral every 8 hours  cholecalciferol 1000 Unit(s) Oral daily  cyanocobalamin 1000 MICROGram(s) Oral daily  dextrose 5% + sodium chloride 0.45%. 1000 milliLiter(s) IV Continuous <Continuous>  enoxaparin Injectable 40 milliGRAM(s) SubCutaneous every 24 hours  fingolimod 0.5 milliGRAM(s) Oral daily  furosemide    Tablet 40 milliGRAM(s) Oral daily  metoprolol tartrate 25 milliGRAM(s) Oral two times a day  pantoprazole    Tablet 40 milliGRAM(s) Oral before breakfast      ROS:  Unable to obtain due to : poor historian      I&O's Detail    11 Feb 2024 07:01  -  12 Feb 2024 07:00  --------------------------------------------------------  IN:    IV PiggyBack: 500 mL    IV PiggyBack: 100 mL  Total IN: 600 mL    OUT:  Total OUT: 0 mL    Total NET: 600 mL      Physical Exam:  Vital Signs Last 24 Hrs  T(C): 36.6 (12 Feb 2024 20:48), Max: 36.9 (11 Feb 2024 22:30)  T(F): 97.9 (12 Feb 2024 20:48), Max: 98.5 (12 Feb 2024 04:37)  HR: 69 (12 Feb 2024 20:48) (69 - 84)  BP: 162/73 (12 Feb 2024 20:48) (128/71 - 162/73)  RR: 18 (12 Feb 2024 20:48) (18 - 19)  SpO2: 93% (12 Feb 2024 20:48) (93% - 99%)    Parameters below as of 12 Feb 2024 20:48  Patient On (Oxygen Delivery Method): room air      Height (cm): 160 (02-12 @ 14:30)  Weight (kg): 74.4 (02-12 @ 14:30)  BMI (kg/m2): 29.1 (02-12 @ 14:30)  BSA (m2): 1.78 (02-12 @ 14:30)    General: weak no acute distress  Neck: supple, trachea midline  Lungs: decreased, no wheeze/rhonchi  Cardiovascular: regular rate and rhythm, S1 S2  Abdomen: soft, nontender, ND, bowel sounds normal  Neurological:  Awake  Skin: no rash  Extremities: no edema    Labs:                         13.7   11.53 )-----------( 251      ( 12 Feb 2024 16:00 )             40.6   02-12    141  |  104  |  7   ----------------------------<  125<H>  2.9<LL>   |  26  |  0.90    Ca    9.1      12 Feb 2024 16:00  Phos  3.0     02-12  Mg     2.0     02-12    TPro  7.7  /  Alb  4.0  /  TBili  0.8  /  DBili  x   /  AST  20  /  ALT  14  /  AlkPhos  96  02-11    Urinalysis (02.11.24 @ 16:45)    Glucose Qualitative, Urine: Negative mg/dL   Blood, Urine: Negative   pH Urine: 5.5   Color: Yellow   Urine Appearance: Clear   Bilirubin: Negative   Ketone - Urine: Negative mg/dL   Specific Gravity: 1.009   Protein, Urine: Negative mg/dL   Urobilinogen: 0.2 mg/dL   Nitrite: Negative   Leukocyte Esterase Concentration: Negative      RECENT CULTURES:    Culture - Urine (collected 11 Feb 2024 16:45)  Source: Catheterized Catheterized  Final Report (12 Feb 2024 20:09):    <10,000 CFU/mL Normal Urogenital Beatris      RADIOLOGY & ADDITIONAL STUDIES:    ACC: [id number] EXAM:  CT CHEST   ORDERED BY: VIRI DOS SANTOS     PROCEDURE DATE:  02/11/2024          INTERPRETATION:  EXAMINATION: CT CHEST    CLINICAL INDICATION: Pneumonia.    TECHNIQUE: Noncontrast CT of the chest was obtained.    COMPARISON: None.    FINDINGS:    AIRWAYS AND LUNGS: The central tracheobronchial tree is patent.  Patchy   left lung opacities. Right lower lobe calcified granuloma.    MEDIASTINUM AND PLEURA: There are no enlarged mediastinal, hilar or   axillary lymph nodes. The visualized portion of the thyroid gland is   unremarkable. There is no pleural effusion. There is no pneumothorax.    HEART AND VESSELS: There is mild cardiomegaly.  There are atherosclerotic   calcifications of the aorta and coronary arteries.  There is no   pericardial effusion.    UPPER ABDOMEN: Images of the upper abdomen demonstrate cholelithiasis.    BONES AND SOFT TISSUES: The bones are unremarkable.  The soft tissues are   unremarkable.      IMPRESSION:  Multifocal pneumonia    Assessment :   72YO F PMH multiple sclerosis, CVA, tremor, resident of [facility] who presented to the hospital with c/o  increasing weakness and inability to ambulate. Subjective fever and had some vomiting and diarrhea. Pt is a poor historian. In ED CT chest showed multifocal pna. WBC 15K No fever n/v/d.  Asp pna  WBc better  Afebrile      Plan :   Cont Zosyn  Fu cultures  Trend temps and cbc  Legionella and pneumococcal antigen  Swallow study  Asp precautions    Continue with present regiment .  Approptiate use of antibiotics and adverse effects reviewed.        > 45 minutes spent in direct patient care reviewing  the notes, lab data/ imaging , discussion with multidisciplinary team. All questions were addressed and answered to the best of my capacity .    Thank you for allowing me to participate in the care of your patient .      Evangelista Cross MD  Infectious Disease  [phone]
Patient is a 73y old  Female who presents with a chief complaint of inability to wall (14 Feb 2024 11:05)    HPI: 73-year-old female with history of multiple sclerosis, h/o multiple CVAs, tremors, brought by ambulance from [facility] for evaluation of increasing weakness and inability to ambulate today.  Patient states she thinks she had a fever and had some vomiting and diarrhea over the past few days.  Poor historian cannot elaborate on her condition.  Her PCP is Dr. Bliss. (12 Feb 2024 11:57)  No facial asymmetry  No Fall or LOC.  She has hesitant speech as per base line.  Walked with walker but had difficulty walking on day of admission  Pt was found to have leucocytosis and multi Focal PNA.  No shaking or seizures.     PAST MEDICAL & SURGICAL HISTORY:    Stroke    HTN (hypertension)    HLD (hyperlipidemia)    Multiple sclerosis    MEDICATIONS  (STANDING):    amLODIPine   Tablet 5 milliGRAM(s) Oral daily  aspirin enteric coated 81 milliGRAM(s) Oral daily  atorvastatin 20 milliGRAM(s) Oral at bedtime  baclofen 10 milliGRAM(s) Oral every 8 hours  cholecalciferol 1000 Unit(s) Oral daily  cyanocobalamin 1000 MICROGram(s) Oral daily  dextrose 5% + sodium chloride 0.45%. 1000 milliLiter(s) (50 mL/Hr) IV Continuous <Continuous>  enoxaparin Injectable 40 milliGRAM(s) SubCutaneous every 24 hours  furosemide    Tablet 40 milliGRAM(s) Oral daily  Mayzent (siponimod) 2mg 1 Tablet(s) 1 Tablet(s) Oral daily  metoprolol tartrate 25 milliGRAM(s) Oral two times a day  nystatin Powder 1 Application(s) Topical two times a day  pantoprazole    Tablet 40 milliGRAM(s) Oral before breakfast  piperacillin/tazobactam IVPB.. 3.375 Gram(s) IV Intermittent every 8 hours    MEDICATIONS  (PRN):    acetaminophen     Tablet .. 650 milliGRAM(s) Oral every 6 hours PRN Temp greater or equal to 38C (100.4F), Mild Pain (1 - 3)    Allergies    No Known Allergies    SOCIAL HISTORY:    No h/o Smoking.   No h/o alcohol use.    FAMILY HISTORY: N/C as per chart    REVIEW OF SYSTEMS: UTO    PHYSICAL EXAM:  Vital Signs Last 24 Hrs  T(F): 99.5 (02-14-24 @ 05:12)  HR: 63 (02-14-24 @ 05:12)  BP: 122/67 (02-14-24 @ 05:12)  RR: 18 (02-14-24 @ 05:12)    GENERAL: NAD, well-groomed, well-developed  HEAD:  Atraumatic, Normocephalic  EYES: EOMI, PERRLA, conjunctiva and sclera clear  NECK: Supple, No JVD, thyroid non-palpable    On Neurological Examination:    Mental Status - Pt is alert, awake, poor cognition. Able to tel month/year correctly. Follows commands    Speech -  Hesitant speech    Cranial Nerves - Pupils 3 mm equal and reactive to light, extraocular eye movements intact. Pt has no visual field deficit. No facial asymmetry. Tongue - is in midline.    Motor Exam - 4-/5 all over, No drift. Has mild tremors. Muscle tone - is increased all over.     Sensory Exam -  Pt withdraws all extremities equally on stimulation. No complaints of new tingling or numbness.    Gait - Pt is able to stand up with holding my hands and is able to walk for few feet    Deep tendon Reflexes - 2 plus all over.    Neck Supple -  Yes.    LABS:                        13.6   9.85  )-----------( 295      ( 14 Feb 2024 08:12 )             41.1     02-14    140  |  104  |  10  ----------------------------<  129<H>  3.6   |  23  |  1.03    Ca    9.3      14 Feb 2024 08:12  Phos  3.0     02-12  Mg     2.0     02-12    Urinalysis Basic - ( 14 Feb 2024 08:12 )    Color: x / Appearance: x / SG: x / pH: x  Gluc: 129 mg/dL / Ketone: x  / Bili: x / Urobili: x   Blood: x / Protein: x / Nitrite: x   Leuk Esterase: x / RBC: x / WBC x   Sq Epi: x / Non Sq Epi: x / Bacteria: x    RADIOLOGY & ADDITIONAL STUDIES:    < from: CT Head No Cont (02.11.24 @ 17:08) >    IMPRESSION:   advanced periventricular, deep and subcortical white matter ischemia. Tiny old lacunar infarctions in the BILATERAL basal ganglia.      Moderate global atrophy, most prominent in the cerebellum.    < end of copied text >    < from: CT Chest No Cont (02.11.24 @ 19:16) >    IMPRESSION:    Multifocal pneumonia    < end of copied text >    
History of Present Illness: The patient is a 73 year old female with a history of HTN, HL, CVA, MS who presents with weakness. The patient is a poor historian. She has had increasing generalized weakness and inability to walk. She has had a fever and vomiting at [facility].    Past Medical/Surgical History:  HTN, HL, CVA, MS     Medications:  Home Medications:  acetaminophen 500 mg oral tablet: 2 tab(s) orally 4 times a day (11 Feb 2024 19:25)  amLODIPine 5 mg oral tablet: 1 tab(s) orally once a day htn (11 Feb 2024 19:25)  atorvastatin 20 mg oral tablet: 1 tab(s) orally once a day (at bedtime) (11 Feb 2024 19:25)  baclofen 10 mg oral tablet: 1 tab(s) orally 3 times a day (11 Feb 2024 19:26)  Boniva 150 mg oral tablet: 1 tab(s) orally once a month (11 Feb 2024 19:26)  furosemide 40 mg oral tablet: 1 tab(s) orally once a day (11 Feb 2024 19:26)  Mayzent 2 mg oral tablet: 1 tab(s) orally once a day (11 Feb 2024 19:26)  metoprolol tartrate 25 mg oral tablet: 1 tab(s) orally 2 times a day (11 Feb 2024 19:26)  Protonix 20 mg oral delayed release tablet: 1 tab(s) orally once a day (11 Feb 2024 19:26)  vit b12 1000mcq: everyday (11 Feb 2024 19:26)  vitamin d3: 1000 IU tab (11 Feb 2024 19:26)      Family History: Non-contributory family history of premature cardiovascular atherosclerotic disease    Social History: No tobacco, alcohol or drug use    Review of Systems:  General: No fevers, chills, weight gain  Skin: No rashes, color changes  Cardiovascular: No chest pain, orthopnea  Respiratory: No shortness of breath, cough  Gastrointestinal: No nausea, abdominal pain  Genitourinary: No incontinence, pain with urination  Musculoskeletal: No pain, swelling, decreased range of motion  Neurological: No headache, +weakness  Psychiatric: No depression, anxiety  Endocrine: No weight gain, increased thirst  All other systems are comprehensively negative.    Physical Exam:  Vitals:        Vital Signs Last 24 Hrs  T(C): 36.9 (12 Feb 2024 04:37), Max: 37.1 (11 Feb 2024 16:12)  T(F): 98.5 (12 Feb 2024 04:37), Max: 98.8 (11 Feb 2024 16:12)  HR: 70 (12 Feb 2024 04:37) (66 - 82)  BP: 128/71 (12 Feb 2024 04:37) (128/70 - 135/75)  BP(mean): --  RR: 18 (12 Feb 2024 04:37) (18 - 20)  SpO2: 97% (12 Feb 2024 04:37) (97% - 99%)  Parameters below as of 12 Feb 2024 04:37  Patient On (Oxygen Delivery Method): room air  General: NAD  HEENT: MMM  Neck: No JVD, no carotid bruit  Lungs: CTAB  CV: RRR, nl S1/S2, no M/R/G  Abdomen: S/NT/ND, +BS  Extremities: No LE edema, no cyanosis  Neuro: AAOx3  Skin: No rash    Labs:                        13.8   15.46 )-----------( 269      ( 11 Feb 2024 16:45 )             41.4     02-11    141  |  100  |  18  ----------------------------<  128<H>  3.0<L>   |  25  |  1.22    Ca    8.8      11 Feb 2024 16:45    TPro  7.7  /  Alb  4.0  /  TBili  0.8  /  DBili  x   /  AST  20  /  ALT  14  /  AlkPhos  96  02-11        PT/INR - ( 11 Feb 2024 16:45 )   PT: 10.9 sec;   INR: 1.00 ratio         PTT - ( 11 Feb 2024 16:45 )  PTT:29.3 sec    ECG/Telemetry: NSR, normal axis, nonspecific ST abnormality

## 2024-02-14 NOTE — PROGRESS NOTE ADULT - SUBJECTIVE AND OBJECTIVE BOX
CAROLINA OROPEZA is a 73yFemale , patient examined and chart reviewed.     INTERVAL HPI/ OVERNIGHT EVENTS:   Feeling better. Afebrile.  In chair. Awake alert.   NAD.    PAST MEDICAL & SURGICAL HISTORY:  Stroke  HTN (hypertension)  HLD (hyperlipidemia)  Multiple sclerosis    For details regarding the patient's social history, family history, and other miscellaneous elements, please refer the initial infectious diseases consultation and/or the admitting history and physical examination for this admission.    ROS: all systems neg       No Known Allergies    Current inpatient medications :    ANTIBIOTICS/RELEVANT:  piperacillin/tazobactam IVPB.. 3.375 Gram(s) IV Intermittent every 8 hours    MEDICATIONS  (STANDING):  amLODIPine   Tablet 5 milliGRAM(s) Oral daily  aspirin enteric coated 81 milliGRAM(s) Oral daily  atorvastatin 20 milliGRAM(s) Oral at bedtime  baclofen 10 milliGRAM(s) Oral every 8 hours  cholecalciferol 1000 Unit(s) Oral daily  cyanocobalamin 1000 MICROGram(s) Oral daily  dextrose 5% + sodium chloride 0.45%. 1000 milliLiter(s) (50 mL/Hr) IV Continuous <Continuous>  enoxaparin Injectable 40 milliGRAM(s) SubCutaneous every 24 hours  furosemide    Tablet 40 milliGRAM(s) Oral daily  Mayzent (siponimod) 2mg 1 Tablet(s) 1 Tablet(s) Oral daily  metoprolol tartrate 25 milliGRAM(s) Oral two times a day  nystatin Powder 1 Application(s) Topical two times a day  pantoprazole    Tablet 40 milliGRAM(s) Oral before breakfast    MEDICATIONS  (PRN):  acetaminophen     Tablet .. 650 milliGRAM(s) Oral every 6 hours PRN Temp greater or equal to 38C (100.4F), Mild Pain (1 - 3)      Objective:  Vital Signs Last 24 Hrs  T(C): 37.5 (14 Feb 2024 05:12), Max: 37.5 (14 Feb 2024 05:12)  T(F): 99.5 (14 Feb 2024 05:12), Max: 99.5 (14 Feb 2024 05:12)  HR: 63 (14 Feb 2024 05:12) (63 - 64)  BP: 122/67 (14 Feb 2024 05:12) (122/67 - 157/73)  RR: 18 (14 Feb 2024 05:12) (18 - 18)  SpO2: 92% (14 Feb 2024 05:12) (92% - 94%)      Physical Exam:  General: no acute distress  Neck: supple, trachea midline  Lungs: decreased no wheeze/rhonchi  Cardiovascular: regular rate and rhythm, S1 S2  Abdomen: soft, nontender,  bowel sounds normal  Neurological: awake alert  Skin: no rash  Extremities: no edema        LABS:                        13.6   9.85  )-----------( 295      ( 14 Feb 2024 08:12 )             41.1   02-14    140  |  104  |  10  ----------------------------<  129<H>  3.6   |  23  |  1.03    Ca    9.3      14 Feb 2024 08:12  Phos  3.0     02-12  Mg     2.0     02-12      MICROBIOLOGY:    Culture - Blood (collected 11 Feb 2024 16:45)  Source: .Blood Blood  Preliminary Report (13 Feb 2024 01:03):    No growth at 24 hours    Culture - Urine (collected 11 Feb 2024 16:45)  Source: Catheterized Catheterized  Final Report (12 Feb 2024 20:09):    <10,000 CFU/mL Normal Urogenital Beatris    Culture - Blood (collected 11 Feb 2024 16:45)  Source: .Blood Blood  Preliminary Report (13 Feb 2024 01:03):    No growth at 24 hours      RADIOLOGY & ADDITIONAL STUDIES:  ACC: [id number] EXAM:  CT CHEST   ORDERED BY: VIRI DOS SANTOS     PROCEDURE DATE:  02/11/2024          INTERPRETATION:  EXAMINATION: CT CHEST    CLINICAL INDICATION: Pneumonia.    TECHNIQUE: Noncontrast CT of the chest was obtained.    COMPARISON: None.    FINDINGS:    AIRWAYS AND LUNGS: The central tracheobronchial tree is patent.  Patchy   left lung opacities. Right lower lobe calcified granuloma.    MEDIASTINUM AND PLEURA: There are no enlarged mediastinal, hilar or   axillary lymph nodes. The visualized portion of the thyroid gland is   unremarkable. There is no pleural effusion. There is no pneumothorax.    HEART AND VESSELS: There is mild cardiomegaly.  There are atherosclerotic   calcifications of the aorta and coronary arteries.  There is no   pericardial effusion.    UPPER ABDOMEN: Images of the upper abdomen demonstrate cholelithiasis.    BONES AND SOFT TISSUES: The bones are unremarkable.  The soft tissues are   unremarkable.      IMPRESSION:  Multifocal pneumonia    Assessment :   72YO F PMH multiple sclerosis, CVA, tremor, resident of [facility] who presented to the hospital with c/o  increasing weakness and inability to ambulate. Subjective fever and had some vomiting and diarrhea. Pt is a poor historian. In ED CT chest showed multifocal pna. WBC 15K  Asp pna  WBC improved  MBS mild dysphagia  Cultures NGTD  Afebrile  Clinically better    Plan :   Cont Zosyn x 5 days  Trend temps and cbc  Pulm toileting  Asp precautions  Stable from ID standpoint    Continue with present regiment.  Appropriate use of antibiotics and adverse effects reviewed.        > 35 minutes were spent in direct patient care reviewing notes, medications ,labs data/ imaging , discussion with multidisciplinary team.    Thank you for allowing me to participate in care of your patient .    Evangelista Cross MD  Infectious Disease  [phone]

## 2024-02-14 NOTE — PROGRESS NOTE ADULT - SUBJECTIVE AND OBJECTIVE BOX
PROGRESS NOTE  Patient is a 73y old  Female who presents with a chief complaint of inability to wall (14 Feb 2024 12:49)    Chart and available morning labs /imaging are reviewed electronically , urgent issues addressed . More information  is being added upon completion of rounds , when more information is collected and management discussed with consultants , medical staff and social service/case management on the floor   OVERNIGHT      HPI:  73-year-old female with history of multiple sclerosis, CVA, tremor, brought by ambulance from [facility] for evaluation of increasing weakness and inability to ambulate today.  Patient states she thinks she had a fever and had some vomiting and diarrhea over the past few days.  Poor historian cannot elaborate on her condition.  Her PCP is Dr. Bliss. (12 Feb 2024 11:57)    PAST MEDICAL & SURGICAL HISTORY:  Stroke      HTN (hypertension)      HLD (hyperlipidemia)      Multiple sclerosis          MEDICATIONS  (STANDING):  amLODIPine   Tablet 5 milliGRAM(s) Oral daily  aspirin enteric coated 81 milliGRAM(s) Oral daily  atorvastatin 20 milliGRAM(s) Oral at bedtime  baclofen 10 milliGRAM(s) Oral every 8 hours  cholecalciferol 1000 Unit(s) Oral daily  cyanocobalamin 1000 MICROGram(s) Oral daily  dextrose 5% + sodium chloride 0.45%. 1000 milliLiter(s) (50 mL/Hr) IV Continuous <Continuous>  enoxaparin Injectable 40 milliGRAM(s) SubCutaneous every 24 hours  furosemide    Tablet 40 milliGRAM(s) Oral daily  Mayzent (siponimod) 2mg 1 Tablet(s) 1 Tablet(s) Oral daily  metoprolol tartrate 25 milliGRAM(s) Oral two times a day  nystatin Powder 1 Application(s) Topical two times a day  pantoprazole    Tablet 40 milliGRAM(s) Oral before breakfast  piperacillin/tazobactam IVPB.. 3.375 Gram(s) IV Intermittent every 8 hours    MEDICATIONS  (PRN):  acetaminophen     Tablet .. 650 milliGRAM(s) Oral every 6 hours PRN Temp greater or equal to 38C (100.4F), Mild Pain (1 - 3)      OBJECTIVE    T(C): 36.5 (02-14-24 @ 14:30), Max: 37.5 (02-14-24 @ 05:12)  HR: 66 (02-14-24 @ 14:30) (63 - 66)  BP: 97/63 (02-14-24 @ 14:30) (97/63 - 148/70)  RR: 18 (02-14-24 @ 14:30) (18 - 18)  SpO2: 95% (02-14-24 @ 14:30) (92% - 95%)  Wt(kg): --  I&O's Summary    13 Feb 2024 07:01  -  14 Feb 2024 07:00  --------------------------------------------------------  IN: 0 mL / OUT: 400 mL / NET: -400 mL    14 Feb 2024 07:01  -  14 Feb 2024 14:50  --------------------------------------------------------  IN: 100 mL / OUT: 650 mL / NET: -550 mL          REVIEW OF SYSTEMS:  CONSTITUTIONAL: No fever, weight loss, or fatigue  EYES: No eye pain, visual disturbances, or discharge  ENMT:   No sinus or throat pain  NECK: No pain or stiffness  RESPIRATORY: No cough, wheezing, chills or hemoptysis; No shortness of breath  CARDIOVASCULAR: No chest pain, palpitations, dizziness, or leg swelling  GASTROINTESTINAL: No abdominal pain. No nausea, vomiting; No diarrhea or constipation. No melena or hematochezia.  GENITOURINARY: No dysuria, frequency, hematuria, or incontinence  NEUROLOGICAL: No headaches, memory loss, loss of strength, numbness, or tremors  SKIN: No itching, burning, rashes, or lesions   MUSCULOSKELETAL: No joint pain or swelling; No muscle, back, or extremity pain    PHYSICAL EXAM:  Appearance: NAD. VS past 24 hrs -as above   HEENT:   Moist oral mucosa. Conjunctiva clear b/l.   Neck : supple  Respiratory: Lungs CTAB.  Gastrointestinal:  Soft, nontender. No rebound. No rigidity. BS present	  Cardiovascular: RRR ,S1S2 present  Neurologic: Non-focal. Moving all extremities.  Extremities: No edema. No erythema. No calf tenderness.  Skin: No rashes, No ecchymoses, No cyanosis.	  wounds ,skin lesions-See skin assesment flow sheet   LABS:                        13.6   9.85  )-----------( 295      ( 14 Feb 2024 08:12 )             41.1     02-14    140  |  104  |  10  ----------------------------<  129<H>  3.6   |  23  |  1.03    Ca    9.3      14 Feb 2024 08:12  Phos  3.0     02-12  Mg     2.0     02-12      CAPILLARY BLOOD GLUCOSE          Urinalysis Basic - ( 14 Feb 2024 08:12 )    Color: x / Appearance: x / SG: x / pH: x  Gluc: 129 mg/dL / Ketone: x  / Bili: x / Urobili: x   Blood: x / Protein: x / Nitrite: x   Leuk Esterase: x / RBC: x / WBC x   Sq Epi: x / Non Sq Epi: x / Bacteria: x        Culture - Blood (collected 11 Feb 2024 16:45)  Source: .Blood Blood  Preliminary Report (14 Feb 2024 01:01):    No growth at 48 Hours    Culture - Urine (collected 11 Feb 2024 16:45)  Source: Catheterized Catheterized  Final Report (12 Feb 2024 20:09):    <10,000 CFU/mL Normal Urogenital Beatris    Culture - Blood (collected 11 Feb 2024 16:45)  Source: .Blood Blood  Preliminary Report (14 Feb 2024 01:01):    No growth at 48 Hours      RADIOLOGY & ADDITIONAL TESTS:   reviewed elctronically  ASSESSMENT/PLAN: 	     PROGRESS NOTE  Patient is a 73y old  Female who presents with a chief complaint of inability to wall (14 Feb 2024 12:49)    Chart and available morning labs /imaging are reviewed electronically , urgent issues addressed . More information  is being added upon completion of rounds , when more information is collected and management discussed with consultants , medical staff and social service/case management on the floor   OVERNIGHT      HPI:  73-year-old female with history of multiple sclerosis, CVA, tremor, brought by ambulance from [facility] for evaluation of increasing weakness and inability to ambulate today.  Patient states she thinks she had a fever and had some vomiting and diarrhea over the past few days.  Poor historian cannot elaborate on her condition.  Her PCP is Dr. Bliss. (12 Feb 2024 11:57)    PAST MEDICAL & SURGICAL HISTORY:  Stroke      HTN (hypertension)      HLD (hyperlipidemia)      Multiple sclerosis          MEDICATIONS  (STANDING):  amLODIPine   Tablet 5 milliGRAM(s) Oral daily  aspirin enteric coated 81 milliGRAM(s) Oral daily  atorvastatin 20 milliGRAM(s) Oral at bedtime  baclofen 10 milliGRAM(s) Oral every 8 hours  cholecalciferol 1000 Unit(s) Oral daily  cyanocobalamin 1000 MICROGram(s) Oral daily  dextrose 5% + sodium chloride 0.45%. 1000 milliLiter(s) (50 mL/Hr) IV Continuous <Continuous>  enoxaparin Injectable 40 milliGRAM(s) SubCutaneous every 24 hours  furosemide    Tablet 40 milliGRAM(s) Oral daily  Mayzent (siponimod) 2mg 1 Tablet(s) 1 Tablet(s) Oral daily  metoprolol tartrate 25 milliGRAM(s) Oral two times a day  nystatin Powder 1 Application(s) Topical two times a day  pantoprazole    Tablet 40 milliGRAM(s) Oral before breakfast  piperacillin/tazobactam IVPB.. 3.375 Gram(s) IV Intermittent every 8 hours    MEDICATIONS  (PRN):  acetaminophen     Tablet .. 650 milliGRAM(s) Oral every 6 hours PRN Temp greater or equal to 38C (100.4F), Mild Pain (1 - 3)      OBJECTIVE    T(C): 36.5 (02-14-24 @ 14:30), Max: 37.5 (02-14-24 @ 05:12)  HR: 66 (02-14-24 @ 14:30) (63 - 66)  BP: 97/63 (02-14-24 @ 14:30) (97/63 - 148/70)  RR: 18 (02-14-24 @ 14:30) (18 - 18)  SpO2: 95% (02-14-24 @ 14:30) (92% - 95%)  Wt(kg): --  I&O's Summary    13 Feb 2024 07:01  -  14 Feb 2024 07:00  --------------------------------------------------------  IN: 0 mL / OUT: 400 mL / NET: -400 mL    14 Feb 2024 07:01  -  14 Feb 2024 14:50  --------------------------------------------------------  IN: 100 mL / OUT: 650 mL / NET: -550 mL          REVIEW OF SYSTEMS:  CONSTITUTIONAL: No fever, weight loss, or fatigue  EYES: No eye pain, visual disturbances, or discharge  ENMT:   No sinus or throat pain  NECK: No pain or stiffness  RESPIRATORY: No cough, wheezing, chills or hemoptysis; No shortness of breath  CARDIOVASCULAR: No chest pain, palpitations, dizziness, or leg swelling  GASTROINTESTINAL: No abdominal pain. No nausea, vomiting; No diarrhea or constipation. No melena or hematochezia.  GENITOURINARY: No dysuria, frequency, hematuria, or incontinence  NEUROLOGICAL: No headaches, memory loss, loss of strength, numbness, or tremors  SKIN: No itching, burning, rashes, or lesions   MUSCULOSKELETAL: No joint pain or swelling; No muscle, back, or extremity pain    PHYSICAL EXAM:  Appearance: NAD. VS past 24 hrs -as above   HEENT:   Moist oral mucosa. Conjunctiva clear b/l.   Neck : supple  Respiratory: Lungs CTAB.  Gastrointestinal:  Soft, nontender. No rebound. No rigidity. BS present	  Cardiovascular: RRR ,S1S2 present  Neurologic: Non-focal. Moving all extremities.  Extremities: No edema. No erythema. No calf tenderness.  Skin: No rashes, No ecchymoses, No cyanosis.	  wounds ,skin lesions-See skin assesment flow sheet   LABS:                        13.6   9.85  )-----------( 295      ( 14 Feb 2024 08:12 )             41.1     02-14    140  |  104  |  10  ----------------------------<  129<H>  3.6   |  23  |  1.03    Ca    9.3      14 Feb 2024 08:12  Phos  3.0     02-12  Mg     2.0     02-12      CAPILLARY BLOOD GLUCOSE          Urinalysis Basic - ( 14 Feb 2024 08:12 )    Color: x / Appearance: x / SG: x / pH: x  Gluc: 129 mg/dL / Ketone: x  / Bili: x / Urobili: x   Blood: x / Protein: x / Nitrite: x   Leuk Esterase: x / RBC: x / WBC x   Sq Epi: x / Non Sq Epi: x / Bacteria: x        Culture - Blood (collected 11 Feb 2024 16:45)  Source: .Blood Blood  Preliminary Report (14 Feb 2024 01:01):    No growth at 48 Hours    Culture - Urine (collected 11 Feb 2024 16:45)  Source: Catheterized Catheterized  Final Report (12 Feb 2024 20:09):    <10,000 CFU/mL Normal Urogenital Beatris    Culture - Blood (collected 11 Feb 2024 16:45)  Source: .Blood Blood  Preliminary Report (14 Feb 2024 01:01):    No growth at 48 Hours      RADIOLOGY & ADDITIONAL TESTS:   reviewed elctronically  ASSESSMENT/PLAN: 	  25 minutes aggregate time was spent on this visit, 50% visit time spent in care co-ordination with other attendings and counselling patient .I have discussed care plan with patient / HCP/family member ,who expressed understanding of problems treatment and their effect and side effects, alternatives in details. I have asked if they have any questions and concerns and appropriately addressed them to best of my ability.

## 2024-02-14 NOTE — DISCHARGE NOTE PROVIDER - HOSPITAL COURSE
The patient is a 73 year old female with a history of HTN, HL, CVA, MS who presents with weakness  - ECG with nonspecific findings  - Continue metoprolol tartrate 25 mg bid  - Continue amlodipine 5 mg daily  - Continue atorvastatin 40 mg sasha  - Continue aspirin 81 mg daily  - CT chest with multifocal PNA  - IV antibiotics  - Continue IV fluids as neededAfebrile at present.  Impression is MS patient with increasing weakness and an inability to ambulate.  Rule out infection rule out dehydration rule out MS exacerbation.  Plan is labs urine chest x-ray CT brain IV fluids and likely admission for further evaluation and treatment.      Problem/Plan - 1:  ·  Problem: Inability to walk.     Problem/Plan - 2:  ·  Problem: Hypokalemia.     Problem/Plan - 3:  ·  Problem: Dehydration.     Problem/Plan - 4:  ·  Problem: Multiple sclerosis.     Problem/Plan - 5:  ·  Problem: Pneumonia, aspiration.     Problem/Plan - 6:  ·  Problem: Dysphagia.     Problem/Plan - 7:  ·  Problem: Elevated WBCs.     Problem/Plan - 8:  ·  Problem: HTN (hypertension).     Problem/Plan - 9:  ·  Problem: HLD (hyperlipidemia).     Problem/Plan - 10:  ·  Problem: Prophylactic measure. 0 = independent

## 2024-02-14 NOTE — DISCHARGE NOTE PROVIDER - NSDCCPCAREPLAN_GEN_ALL_CORE_FT
PRINCIPAL DISCHARGE DIAGNOSIS  Diagnosis: Inability to walk  Assessment and Plan of Treatment:       SECONDARY DISCHARGE DIAGNOSES  Diagnosis: Multiple sclerosis  Assessment and Plan of Treatment:     Diagnosis: Pneumonia, aspiration  Assessment and Plan of Treatment:     Diagnosis: Dysphagia  Assessment and Plan of Treatment:     Diagnosis: Hypokalemia  Assessment and Plan of Treatment:     Diagnosis: Dehydration  Assessment and Plan of Treatment:     Diagnosis: HTN (hypertension)  Assessment and Plan of Treatment:     Diagnosis: HLD (hyperlipidemia)  Assessment and Plan of Treatment:

## 2024-02-14 NOTE — PROGRESS NOTE ADULT - PROBLEM SELECTOR PROBLEM 4
[facility] at 2pm.  
Multiple sclerosis
Multiple sclerosis

## 2024-02-14 NOTE — DISCHARGE NOTE PROVIDER - ATTENDING DISCHARGE PHYSICAL EXAM:
Attending Discharge Physical Examination: The patient is a 58y Female complaining of urinary retention.

## 2024-02-14 NOTE — PROGRESS NOTE ADULT - SUBJECTIVE AND OBJECTIVE BOX
Chief Complaint: Weakness    Interval Events: No events overnight.    Review of Systems:  General: No fevers, chills, weight gain  Skin: No rashes, color changes  Cardiovascular: No chest pain, orthopnea  Respiratory: No shortness of breath, cough  Gastrointestinal: No nausea, abdominal pain  Genitourinary: No incontinence, pain with urination  Musculoskeletal: No pain, swelling, decreased range of motion  Neurological: No headache, +weakness  Psychiatric: No depression, anxiety  Endocrine: No weight gain, increased thirst  All other systems are comprehensively negative.    Physical Exam:  Vital Signs Last 24 Hrs  T(C): 37.5 (14 Feb 2024 05:12), Max: 37.5 (14 Feb 2024 05:12)  T(F): 99.5 (14 Feb 2024 05:12), Max: 99.5 (14 Feb 2024 05:12)  HR: 63 (14 Feb 2024 05:12) (63 - 64)  BP: 122/67 (14 Feb 2024 05:12) (122/67 - 157/73)  BP(mean): --  RR: 18 (14 Feb 2024 05:12) (18 - 18)  SpO2: 92% (14 Feb 2024 05:12) (92% - 94%)  General: NAD  HEENT: MMM  Neck: No JVD, no carotid bruit  Lungs: CTAB  CV: RRR, nl S1/S2, no M/R/G  Abdomen: S/NT/ND, +BS  Extremities: No LE edema, no cyanosis  Neuro: AAOx3, non-focal  Skin: No rash    Labs:    02-14    140  |  104  |  10  ----------------------------<  129<H>  3.6   |  23  |  1.03    Ca    9.3      14 Feb 2024 08:12  Phos  3.0     02-12  Mg     2.0     02-12                          13.6   9.85  )-----------( 295      ( 14 Feb 2024 08:12 )             41.1

## 2024-02-14 NOTE — DISCHARGE NOTE PROVIDER - PROVIDER TOKENS
PROVIDER:[TOKEN:[[id number]],FOLLOWUP:[2 weeks]],PROVIDER:[TOKEN:[[id number]],FOLLOWUP:[2 weeks]]

## 2024-02-14 NOTE — SOCIAL WORK PROGRESS NOTE - NSSWPROGRESSNOTE_GEN_ALL_CORE
Per discussion w/ inpatient interdisciplinary treatment team this AM, patient is anticipated to be medically cleared for transition to next level of care tomorrow, 02/15/2024. Per discussion w/ inpatient physical & occupational therapists, recommended discharge plan is for acute or sub-acute rehab.  sent acute rehab referral to all facilities in [facility] ([facility], [facility] in [place], and [facility] in [place]), however patient was not accepted to any due to lack of eligible acute diagnosis.  then met w/ patient @ bedside on [facility] and spoke w/ her daughter Rita @ p [phone] who both confirmed to be understanding of and agreeable to sub-acute rehab rather than acute, and they requested referral be sent to the [facility] in [place].  sent referral accordingly and then spoke w/ Valeria KEY in the admissions dept @ p [phone] who confirmed that patient has been accepted w/ a start of care date of tomorrow, 02/15/2024. No pre-authorization required for sub-acute rehab as patient's primary health insurance is Medicare, and the 3-night hospital stay guideline has already been met, as patient was admitted to the hospital on 02/11/2024. Daughter is aware of out-of-pocket cost for ambulette transport for discharge. Will continue to follow.

## 2024-02-14 NOTE — GOALS OF CARE CONVERSATION - ADVANCED CARE PLANNING - CONVERSATION DETAILS
Palliative care SW met with patient at bedside. Reviewed patient's medical and social history as well as events leading to patient's hospitalization. Writer discussed patient's current diagnosis (PNA; HX of MS, CVA, tremor), medical condition and management. Patient has a HCP on chart with Rita Bender as health care agent and Thomas Bender as alternate agent. Inquired about patient's wishes regarding extent of medical care to be provided including thoughts regarding cardiopulmonary resuscitation and mechanical ventilation/intubation. Patient stated that her father was on a ventilator and she would never want to go through this. She agrees to DNR/DNI. Reviewed MOLST form. MOLST completed with DNR/DNI orders and placed on patient's chart.  All questions answered. Psychosocial support provided.

## 2024-02-14 NOTE — CONSULT NOTE ADULT - ASSESSMENT
The patient is a 73 year old female with a history of HTN, HL, CVA, MS who presents with weakness    Plan:  - ECG with nonspecific findings  - Continue metoprolol tartrate 25 mg bid  - Continue amlodipine 5 mg daily  - Continue atorvastatin 40 mg sasha  - Continue aspirin 81 mg daily  - CT chest with multifocal PNA  - IV antibiotics  - Continue IV fluids as needed
73-year-old female with history of multiple sclerosis, h/o multiple CVAs, tremors, brought by ambulance from [facility] for evaluation of increasing weakness and inability to ambulate today.  Patient states she thinks she had a fever and had some vomiting and diarrhea over the past few days.  Poor historian cannot elaborate on her condition.  Her PCP is Dr. Bliss. (12 Feb 2024 11:57)  No facial asymmetry  No Fall or LOC.  She has hesitant speech as per base line.  Walked with walker but had difficulty walking on day of admission  Pt was found to have leucocytosis and multi Focal PNA.  No shaking or seizures.  No signs of New CVA or MS Exacerbation.  Continue antibiotics.  ID Follows.  PT Eval noted.  Ambulation with assistance only.   Fall/safety precautions.  Neurologically could be discharged when medically cleared.  D/w Dr. Reeves and the   Would follow. 
   Initial evaluation/Pulmonary Critical Care consultation requested by DR BAXTER  on 2/11/2024   from Dr Larry Roper    Patient examined chart reviewed    HOSPITAL ADMISSION   PATIENT CAME  FROM (if information available)      AB.   .  VS/ PO/IO/ VENT/ DRIPS.   2/11/2024 afeb 67 130/78  2/11/2024 ra 99%    . CC . 2/11/2024 weakness from [facility]   . SUMMARY.     . 2/11/2024 73-year-old female with history of multiple sclerosis, CVA, tremor, brought by ambulance from [facility] assisted living for evaluation of increasing weakness and inability to ambulate today.  Patient states she thinks she had a fever and had some vomiting and diarrhea over the past few days.  Poor historian cannot elaborate on her condition.  Her PCP is Dr. Bliss.  . ER MG  .. 2/11/2024 5p zosyn   .. 2/11/2024 5p vanco   .. 2/11/2024 5p ns 1l   . HOME MEDS   .. atorvastat 40   .. asa 81   .. gilenya 0.5   . OVERALL ASSESSMENT PLAN.   . SEPSIS   . RO PNEUM   .. w 2/11/2024 w 15  .. ua 2/11/2024 le (-)   .. CXR 2/11/2024  .... incr perihilar markings   .. 2/11/2024 Chcek rvp     . HEMODYNAMICS  .. la 2/11/2024 la 1.8    .. bp ok     HEMAT  .. Hb 2/11/2024 hb 13   .. inr 2/11/2024 inr 1     RENAL   .. Na 2/11/2024 na 141  .. k 2/11/2024 k 3   .. cr 2/11/2024 cr 1.2     . WEAKNESS RO CVA   .. ct h 2/11/2024 cw mr 6/10/2018  .... old lacunar bl basal ganglia     . COURSE/ACTIVE/SIGNIFICANT ISSUE(S) .    . SEPSIS  . RO ASPN PNEUM   . MULTIPLE SCLEROSIS   . WEAKNESS     TIME SPENT.  . Over 55  minutes aggregate care time spent on encounter; activities included   direct patient care, counseling and/or coordinating care reviewing notes, lab data/ imaging , discussion with multidisciplinary team/ patient  /family and explaining in detail risks, benefits, alternatives  of the recommendations     PATIENT.  . SANDIP FIGUEROA 73 f 2/11/2024 1952 DR JOSE GUADALUPE BRIONES

## 2024-02-14 NOTE — DISCHARGE NOTE PROVIDER - CARE PROVIDER_API CALL
Humphrey Fallon  Neurology  [address] [place] [address]  Phone: [phone]  Fax: [phone]  Follow Up Time: 2 weeks    Jack Mendoza.  Cardiovascular Disease  [address] [address]  Phone: ()-  Fax: ()-  Follow Up Time: 2 weeks

## 2024-02-15 VITALS
RESPIRATION RATE: 18 BRPM | OXYGEN SATURATION: 96 % | DIASTOLIC BLOOD PRESSURE: 64 MMHG | HEART RATE: 56 BPM | SYSTOLIC BLOOD PRESSURE: 145 MMHG | TEMPERATURE: 98 F

## 2024-02-15 PROCEDURE — 87899 AGENT NOS ASSAY W/OPTIC: CPT

## 2024-02-15 PROCEDURE — 74230 X-RAY XM SWLNG FUNCJ C+: CPT

## 2024-02-15 PROCEDURE — 96365 THER/PROPH/DIAG IV INF INIT: CPT

## 2024-02-15 PROCEDURE — 87449 NOS EACH ORGANISM AG IA: CPT

## 2024-02-15 PROCEDURE — 97530 THERAPEUTIC ACTIVITIES: CPT

## 2024-02-15 PROCEDURE — 87637 SARSCOV2&INF A&B&RSV AMP PRB: CPT

## 2024-02-15 PROCEDURE — 85025 COMPLETE CBC W/AUTO DIFF WBC: CPT

## 2024-02-15 PROCEDURE — 83880 ASSAY OF NATRIURETIC PEPTIDE: CPT

## 2024-02-15 PROCEDURE — 85730 THROMBOPLASTIN TIME PARTIAL: CPT

## 2024-02-15 PROCEDURE — 87641 MR-STAPH DNA AMP PROBE: CPT

## 2024-02-15 PROCEDURE — 87086 URINE CULTURE/COLONY COUNT: CPT

## 2024-02-15 PROCEDURE — 85610 PROTHROMBIN TIME: CPT

## 2024-02-15 PROCEDURE — 70450 CT HEAD/BRAIN W/O DYE: CPT | Mod: MA

## 2024-02-15 PROCEDURE — 80053 COMPREHEN METABOLIC PANEL: CPT

## 2024-02-15 PROCEDURE — 93005 ELECTROCARDIOGRAM TRACING: CPT

## 2024-02-15 PROCEDURE — 92526 ORAL FUNCTION THERAPY: CPT

## 2024-02-15 PROCEDURE — 85027 COMPLETE CBC AUTOMATED: CPT

## 2024-02-15 PROCEDURE — 86803 HEPATITIS C AB TEST: CPT

## 2024-02-15 PROCEDURE — 83735 ASSAY OF MAGNESIUM: CPT

## 2024-02-15 PROCEDURE — 97116 GAIT TRAINING THERAPY: CPT

## 2024-02-15 PROCEDURE — 84100 ASSAY OF PHOSPHORUS: CPT

## 2024-02-15 PROCEDURE — 87640 STAPH A DNA AMP PROBE: CPT

## 2024-02-15 PROCEDURE — 97161 PT EVAL LOW COMPLEX 20 MIN: CPT

## 2024-02-15 PROCEDURE — 80048 BASIC METABOLIC PNL TOTAL CA: CPT

## 2024-02-15 PROCEDURE — 81003 URINALYSIS AUTO W/O SCOPE: CPT

## 2024-02-15 PROCEDURE — 71045 X-RAY EXAM CHEST 1 VIEW: CPT

## 2024-02-15 PROCEDURE — 92611 MOTION FLUOROSCOPY/SWALLOW: CPT

## 2024-02-15 PROCEDURE — 71250 CT THORAX DX C-: CPT

## 2024-02-15 PROCEDURE — 87040 BLOOD CULTURE FOR BACTERIA: CPT

## 2024-02-15 PROCEDURE — 99285 EMERGENCY DEPT VISIT HI MDM: CPT | Mod: 25

## 2024-02-15 PROCEDURE — 36415 COLL VENOUS BLD VENIPUNCTURE: CPT

## 2024-02-15 PROCEDURE — 97165 OT EVAL LOW COMPLEX 30 MIN: CPT

## 2024-02-15 PROCEDURE — 83605 ASSAY OF LACTIC ACID: CPT

## 2024-02-15 PROCEDURE — 96374 THER/PROPH/DIAG INJ IV PUSH: CPT

## 2024-02-15 PROCEDURE — 97110 THERAPEUTIC EXERCISES: CPT

## 2024-02-15 RX ADMIN — Medication 25 MILLIGRAM(S): at 06:44

## 2024-02-15 RX ADMIN — Medication 10 MILLIGRAM(S): at 13:01

## 2024-02-15 RX ADMIN — PIPERACILLIN AND TAZOBACTAM 25 GRAM(S): 4; .5 INJECTION, POWDER, LYOPHILIZED, FOR SOLUTION INTRAVENOUS at 06:43

## 2024-02-15 RX ADMIN — Medication 1000 UNIT(S): at 11:07

## 2024-02-15 RX ADMIN — PANTOPRAZOLE SODIUM 40 MILLIGRAM(S): 20 TABLET, DELAYED RELEASE ORAL at 06:44

## 2024-02-15 RX ADMIN — AMLODIPINE BESYLATE 5 MILLIGRAM(S): 2.5 TABLET ORAL at 06:44

## 2024-02-15 RX ADMIN — Medication 81 MILLIGRAM(S): at 11:07

## 2024-02-15 RX ADMIN — Medication 10 MILLIGRAM(S): at 06:43

## 2024-02-15 RX ADMIN — NYSTATIN CREAM 1 APPLICATION(S): 100000 CREAM TOPICAL at 06:44

## 2024-02-15 RX ADMIN — Medication 40 MILLIGRAM(S): at 06:44

## 2024-02-15 RX ADMIN — PREGABALIN 1000 MICROGRAM(S): 225 CAPSULE ORAL at 11:07

## 2024-02-15 NOTE — CAREGIVER ENGAGEMENT NOTE - CAREGIVER EDUCATION NOTES - FREE TEXT
Per discussion w/ inpatient interdisciplinary treatment team this AM, patient medically cleared for transition to next level of care today, 02/15/2024.  spoke w/ Valeria KEY in the admissions dept for patient's/daughter's preferred sub-acute rehab facility, the [facility] in [place], @ p [phone] who confirmed that patient has been accepted w/ a start of care date/time for today, 02/15/2024, @ 12:00. Afterward,  requested ambulette for discharge transport via sending electronic referral to [facility] -- trip assigned to transport provider LAWANDA @ p [phone], total cost $208.  met w/ patient @ bedside who confirmed to be understanding of and agreeable to the above discharge plan. Next,  spoke w/ patient's daughter Rita @ p [phone] who also confirmed to be understanding of and agreeable to the above discharge plan, and she also confirmed to be agreeable to calling in credit card payment for the above ambulette trip.    Attending MD Reeves & unit RN Hany both aware. Packet of clinical information, including original pink MOLST form, left in RN station on unit [address] to accompany patient to receiving facility.  to remain available for any continued needs.
Per discussion w/ inpatient interdisciplinary treatment team this AM, patient is anticipated to be medically cleared for transition to next level of care tomorrow, 02/15/2024. Per discussion w/ inpatient physical & occupational therapists, recommended discharge plan is for acute or sub-acute rehab.  sent acute rehab referral to all facilities in [facility] ([facility], [facility] in [place], and [facility] in [place]), however patient was not accepted to any due to lack of eligible acute diagnosis.  then met w/ patient @ bedside on unit [address] and spoke w/ her daughter Rita @ p [phone] who both confirmed to be understanding of and agreeable to sub-acute rehab rather than acute, and they requested referral be sent to the [facility] in [place].  sent referral accordingly and then spoke w/ Valeria KEY in the admissions dept @ p [phone] who confirmed that patient has been accepted w/ a start of care date of tomorrow, 02/15/2024. No pre-authorization required for sub-acute rehab as patient's primary health insurance is Medicare, and the 3-night hospital stay guideline has already been met, as patient was admitted to the hospital on 02/11/2024. Daughter is aware of out-of-pocket cost for ambulette transport for discharge. Will continue to follow.
CM has discussed all above with daughter and initiated DC planning discussion.

## 2024-02-15 NOTE — PROGRESS NOTE ADULT - SUBJECTIVE AND OBJECTIVE BOX
PROGRESS NOTE  Patient is a 73y old  Female who presents with a chief complaint of inability to wall (15 Feb 2024 10:28)      OVERNIGHT      HPI:  73-year-old female with history of multiple sclerosis, CVA, tremor, brought by ambulance from [facility] for evaluation of increasing weakness and inability to ambulate today.  Patient states she thinks she had a fever and had some vomiting and diarrhea over the past few days.  Poor historian cannot elaborate on her condition.  Her PCP is Dr. Bliss. (12 Feb 2024 11:57)    PAST MEDICAL & SURGICAL HISTORY:  Stroke      HTN (hypertension)      HLD (hyperlipidemia)      Multiple sclerosis          MEDICATIONS  (STANDING):  amLODIPine   Tablet 5 milliGRAM(s) Oral daily  aspirin enteric coated 81 milliGRAM(s) Oral daily  atorvastatin 20 milliGRAM(s) Oral at bedtime  baclofen 10 milliGRAM(s) Oral every 8 hours  cholecalciferol 1000 Unit(s) Oral daily  cyanocobalamin 1000 MICROGram(s) Oral daily  dextrose 5% + sodium chloride 0.45%. 1000 milliLiter(s) (50 mL/Hr) IV Continuous <Continuous>  enoxaparin Injectable 40 milliGRAM(s) SubCutaneous every 24 hours  furosemide    Tablet 40 milliGRAM(s) Oral daily  Mayzent (siponimod) 2mg 1 Tablet(s) 1 Tablet(s) Oral daily  metoprolol tartrate 25 milliGRAM(s) Oral two times a day  nystatin Powder 1 Application(s) Topical two times a day  pantoprazole    Tablet 40 milliGRAM(s) Oral before breakfast  piperacillin/tazobactam IVPB.. 3.375 Gram(s) IV Intermittent every 8 hours    MEDICATIONS  (PRN):  acetaminophen     Tablet .. 650 milliGRAM(s) Oral every 6 hours PRN Temp greater or equal to 38C (100.4F), Mild Pain (1 - 3)      OBJECTIVE    T(C): 36.7 (02-15-24 @ 08:16), Max: 37 (02-15-24 @ 05:16)  HR: 56 (02-15-24 @ 08:16) (56 - 73)  BP: 145/64 (02-15-24 @ 08:16) (121/67 - 145/64)  RR: 18 (02-15-24 @ 08:16) (18 - 18)  SpO2: 96% (02-15-24 @ 08:16) (93% - 96%)  Wt(kg): --  I&O's Summary    14 Feb 2024 07:01  -  15 Feb 2024 07:00  --------------------------------------------------------  IN: 600 mL / OUT: 650 mL / NET: -50 mL    15 Feb 2024 07:01  -  15 Feb 2024 16:26  --------------------------------------------------------  IN: 0 mL / OUT: 1000 mL / NET: -1000 mL          REVIEW OF SYSTEMS:  CONSTITUTIONAL: No fever, weight loss, or fatigue  EYES: No eye pain, visual disturbances, or discharge  ENMT:   No sinus or throat pain  NECK: No pain or stiffness  RESPIRATORY: No cough, wheezing, chills or hemoptysis; No shortness of breath  CARDIOVASCULAR: No chest pain, palpitations, dizziness, or leg swelling  GASTROINTESTINAL: No abdominal pain. No nausea, vomiting; No diarrhea or constipation. No melena or hematochezia.  GENITOURINARY: No dysuria, frequency, hematuria, or incontinence  NEUROLOGICAL: No headaches, memory loss, loss of strength, numbness, or tremors  SKIN: No itching, burning, rashes, or lesions   MUSCULOSKELETAL: No joint pain or swelling; No muscle, back, or extremity pain    PHYSICAL EXAM:  Appearance: NAD. VS past 24 hrs -as above   HEENT:   Moist oral mucosa. Conjunctiva clear b/l.   Neck : supple  Respiratory: Lungs CTAB.  Gastrointestinal:  Soft, nontender. No rebound. No rigidity. BS present	  Cardiovascular: RRR ,S1S2 present  Neurologic: Non-focal. Moving all extremities.  Extremities: No edema. No erythema. No calf tenderness.  Skin: No rashes, No ecchymoses, No cyanosis.	  wounds ,skin lesions-See skin assesment flow sheet   LABS:                        13.6   9.85  )-----------( 295      ( 14 Feb 2024 08:12 )             41.1     02-14    140  |  104  |  10  ----------------------------<  129<H>  3.6   |  23  |  1.03    Ca    9.3      14 Feb 2024 08:12      CAPILLARY BLOOD GLUCOSE          Urinalysis Basic - ( 14 Feb 2024 08:12 )    Color: x / Appearance: x / SG: x / pH: x  Gluc: 129 mg/dL / Ketone: x  / Bili: x / Urobili: x   Blood: x / Protein: x / Nitrite: x   Leuk Esterase: x / RBC: x / WBC x   Sq Epi: x / Non Sq Epi: x / Bacteria: x        Culture - Blood (collected 11 Feb 2024 16:45)  Source: .Blood Blood  Preliminary Report (15 Feb 2024 01:01):    No growth at 72 Hours    Culture - Urine (collected 11 Feb 2024 16:45)  Source: Catheterized Catheterized  Final Report (12 Feb 2024 20:09):    <10,000 CFU/mL Normal Urogenital Beatris    Culture - Blood (collected 11 Feb 2024 16:45)  Source: .Blood Blood  Preliminary Report (15 Feb 2024 01:01):    No growth at 72 Hours      RADIOLOGY & ADDITIONAL TESTS:   reviewed elctronically  ASSESSMENT/PLAN: 	    Patient was seen and examined on a day of discharge . Plan of care , discharge medications and recommendations discussed with consultants and clearance for discharge obtained .Social service , case management  and medical staff are aware of plan. Family is notified. Discharge summary  is  prepared electronically-see separate document prepared by me .75minutes spent on this visit, 50% visit time spent in care co-ordination with other attendings and counselling patient  I have discussed care plan with patient and HCP,expressed understanding of problems treatment and their effect and side effects, alternatives in detail,I have asked if they have any questions and concerns and appropriately addressed them to best of my ability

## 2024-02-15 NOTE — CAREGIVER ENGAGEMENT NOTE - CAREGIVER EDUCATION - TYPES DISCUSSED
Acute rehab/After-care skilled tasks/Assisted living/Discharge plan/DME/Insurance benefits/Post-acute care agency contact/Post-discharge escalation process/Transportation coordination/Transportation letter provided
Assisted living/Discharge plan/DME/Home Care Services
Acute rehab/After-care skilled tasks/Assisted living/Discharge plan/DME/Insurance benefits/Post-acute care agency contact/Post-discharge escalation process/Transportation coordination/Transportation letter provided

## 2024-02-15 NOTE — PROGRESS NOTE ADULT - PROVIDER SPECIALTY LIST ADULT
Cardiology
Cardiology
Hospitalist
Hospitalist
Infectious Disease
Pulmonology
Pulmonology
Cardiology
Hospitalist
Infectious Disease
Pulmonology
Pulmonology
Infectious Disease
Hospitalist

## 2024-02-15 NOTE — PROGRESS NOTE ADULT - SUBJECTIVE AND OBJECTIVE BOX
CAROLINA OROPEZA is a 73yFemale , patient examined and chart reviewed.     INTERVAL HPI/ OVERNIGHT EVENTS:   NAD. No events.  In chair.        PAST MEDICAL & SURGICAL HISTORY:  Stroke  HTN (hypertension)  HLD (hyperlipidemia)  Multiple sclerosis    For details regarding the patient's social history, family history, and other miscellaneous elements, please refer the initial infectious diseases consultation and/or the admitting history and physical examination for this admission.    ROS: all systems neg       No Known Allergies    Current inpatient medications :    ANTIBIOTICS/RELEVANT:  piperacillin/tazobactam IVPB.. 3.375 Gram(s) IV Intermittent every 8 hours    MEDICATIONS  (STANDING):  amLODIPine   Tablet 5 milliGRAM(s) Oral daily  aspirin enteric coated 81 milliGRAM(s) Oral daily  atorvastatin 20 milliGRAM(s) Oral at bedtime  baclofen 10 milliGRAM(s) Oral every 8 hours  cholecalciferol 1000 Unit(s) Oral daily  cyanocobalamin 1000 MICROGram(s) Oral daily  dextrose 5% + sodium chloride 0.45%. 1000 milliLiter(s) (50 mL/Hr) IV Continuous <Continuous>  enoxaparin Injectable 40 milliGRAM(s) SubCutaneous every 24 hours  furosemide    Tablet 40 milliGRAM(s) Oral daily  Mayzent (siponimod) 2mg 1 Tablet(s) 1 Tablet(s) Oral daily  metoprolol tartrate 25 milliGRAM(s) Oral two times a day  nystatin Powder 1 Application(s) Topical two times a day  pantoprazole    Tablet 40 milliGRAM(s) Oral before breakfast    MEDICATIONS  (PRN):  acetaminophen     Tablet .. 650 milliGRAM(s) Oral every 6 hours PRN Temp greater or equal to 38C (100.4F), Mild Pain (1 - 3)        Objective:  Vital Signs Last 24 Hrs  T(C): 36.7 (15 Feb 2024 08:16), Max: 37 (15 Feb 2024 05:16)  T(F): 98 (15 Feb 2024 08:16), Max: 98.6 (15 Feb 2024 05:16)  HR: 56 (15 Feb 2024 08:16) (56 - 63)  BP: 145/64 (15 Feb 2024 08:16) (134/68 - 145/64)  RR: 18 (15 Feb 2024 08:16) (18 - 18)  SpO2: 96% (15 Feb 2024 08:16) (93% - 96%)    Parameters below as of 15 Feb 2024 08:16  Patient On (Oxygen Delivery Method): room air    Physical Exam:  General: no acute distress  Neck: supple, trachea midline  Lungs: decreased no wheeze/rhonchi  Cardiovascular: regular rate and rhythm, S1 S2  Abdomen: soft, nontender,  bowel sounds normal  Neurological: awake alert  Skin: no rash  Extremities: no edema        LABS:                        13.6   9.85  )-----------( 295      ( 14 Feb 2024 08:12 )             41.1   02-14    140  |  104  |  10  ----------------------------<  129<H>  3.6   |  23  |  1.03    Ca    9.3      14 Feb 2024 08:12      MICROBIOLOGY:  Culture - Blood (collected 11 Feb 2024 16:45)  Source: .Blood Blood  Preliminary Report (13 Feb 2024 01:03):    No growth at 24 hours    Culture - Urine (collected 11 Feb 2024 16:45)  Source: Catheterized Catheterized  Final Report (12 Feb 2024 20:09):    <10,000 CFU/mL Normal Urogenital Beatris    Culture - Blood (collected 11 Feb 2024 16:45)  Source: .Blood Blood  Preliminary Report (13 Feb 2024 01:03):    No growth at 24 hours      RADIOLOGY & ADDITIONAL STUDIES:  ACC: [id number] EXAM:  CT CHEST   ORDERED BY: VIRI DOS SANTOS     PROCEDURE DATE:  02/11/2024          INTERPRETATION:  EXAMINATION: CT CHEST    CLINICAL INDICATION: Pneumonia.    TECHNIQUE: Noncontrast CT of the chest was obtained.    COMPARISON: None.    FINDINGS:    AIRWAYS AND LUNGS: The central tracheobronchial tree is patent.  Patchy   left lung opacities. Right lower lobe calcified granuloma.    MEDIASTINUM AND PLEURA: There are no enlarged mediastinal, hilar or   axillary lymph nodes. The visualized portion of the thyroid gland is   unremarkable. There is no pleural effusion. There is no pneumothorax.    HEART AND VESSELS: There is mild cardiomegaly.  There are atherosclerotic   calcifications of the aorta and coronary arteries.  There is no   pericardial effusion.    UPPER ABDOMEN: Images of the upper abdomen demonstrate cholelithiasis.    BONES AND SOFT TISSUES: The bones are unremarkable.  The soft tissues are   unremarkable.      IMPRESSION:  Multifocal pneumonia    Assessment :   74YO F PMH multiple sclerosis, CVA, tremor, resident of [facility] who presented to the hospital with c/o  increasing weakness and inability to ambulate. Subjective fever and had some vomiting and diarrhea. Pt is a poor historian. In ED CT chest showed multifocal pna. WBC 15K  Asp pna  WBC improved  MBS mild dysphagia  Cultures NGTD  Afebrile  Clinically better    Plan :   Cont Zosyn x 5 days till 2/16  To go back to [facility] to finish remaining course   Trend temps and cbc  Pulm toileting  Asp precautions  Stable from ID standpoint  Dc to [facility] today     D/w Dr Reeves    Continue with present regiment.  Appropriate use of antibiotics and adverse effects reviewed.        > 35 minutes were spent in direct patient care reviewing notes, medications ,labs data/ imaging , discussion with multidisciplinary team.    Thank you for allowing me to participate in care of your patient .    Evangelista Cross MD  Infectious Disease  [phone]

## 2024-02-15 NOTE — SOCIAL WORK PROGRESS NOTE - NSSWPROGRESSNOTE_GEN_ALL_CORE
Per discussion w/ inpatient interdisciplinary treatment team this AM, patient medically cleared for transition to next level of care today, 02/15/2024.  spoke w/ Valeria KEY in the admissions dept for patient's/daughter's preferred sub-acute rehab facility, the [facility] in [place], @ p [phone] who confirmed that patient has been accepted w/ a start of care date/time for today, 02/15/2024, @ 12:00. Afterward,  requested ambulette for discharge transport via sending electronic referral to [facility] -- trip assigned to transport provider LAWANDA @ p [phone], total cost $208.  met w/ patient @ bedside who confirmed to be understanding of and agreeable to the above discharge plan. Next,  spoke w/ patient's daughter Rita @ p [phone] who also confirmed to be understanding of and agreeable to the above discharge plan, and she also confirmed to be agreeable to calling in credit card payment for the above ambulette trip.    Attending MD Reeves & unit RN Hany both aware. Packet of clinical information, including original pink MOLST form, left in RN station on unit 1East to accompany patient to receiving facility.  to remain available for any continued needs.

## 2024-02-15 NOTE — PROGRESS NOTE ADULT - SUBJECTIVE AND OBJECTIVE BOX
Chief Complaint: Weakness    Interval Events: No events overnight.    Review of Systems:  General: No fevers, chills, weight gain  Skin: No rashes, color changes  Cardiovascular: No chest pain, orthopnea  Respiratory: No shortness of breath, cough  Gastrointestinal: No nausea, abdominal pain  Genitourinary: No incontinence, pain with urination  Musculoskeletal: No pain, swelling, decreased range of motion  Neurological: No headache, +weakness  Psychiatric: No depression, anxiety  Endocrine: No weight gain, increased thirst  All other systems are comprehensively negative.    Physical Exam:  Vital Signs Last 24 Hrs  T(C): 37 (15 Feb 2024 05:16), Max: 37 (15 Feb 2024 05:16)  T(F): 98.6 (15 Feb 2024 05:16), Max: 98.6 (15 Feb 2024 05:16)  HR: 60 (15 Feb 2024 05:16) (60 - 73)  BP: 144/73 (15 Feb 2024 05:16) (97/63 - 144/73)  BP(mean): --  RR: 18 (15 Feb 2024 05:16) (18 - 18)  SpO2: 93% (15 Feb 2024 05:16) (93% - 96%)  Parameters below as of 15 Feb 2024 05:16  Patient On (Oxygen Delivery Method): room air  General: NAD  HEENT: MMM  Neck: No JVD, no carotid bruit  Lungs: CTAB  CV: RRR, nl S1/S2, no M/R/G  Abdomen: S/NT/ND, +BS  Extremities: No LE edema, no cyanosis  Neuro: AAOx3, non-focal  Skin: No rash    Labs:    02-14    140  |  104  |  10  ----------------------------<  129<H>  3.6   |  23  |  1.03    Ca    9.3      14 Feb 2024 08:12                          13.6   9.85  )-----------( 295      ( 14 Feb 2024 08:12 )             41.1

## 2024-02-15 NOTE — PROGRESS NOTE ADULT - SUBJECTIVE AND OBJECTIVE BOX
CHIEF COMPLAINT/ REASON FOR VISIT  .. Patient was seen to address the  issue listed under PROBLEM LIST which is located toward bottom of this note     CAROLINA OROPEZA    SY 1EST 89 W1    Allergies    No Known Allergies    Intolerances        PAST MEDICAL & SURGICAL HISTORY:  Stroke      HTN (hypertension)      HLD (hyperlipidemia)      Multiple sclerosis          FAMILY HISTORY:      Home Medications:  acetaminophen 500 mg oral tablet: 2 tab(s) orally 4 times a day (11 Feb 2024 19:25)  amLODIPine 5 mg oral tablet: 1 tab(s) orally once a day (14 Feb 2024 12:44)  aspirin 81 mg oral delayed release tablet: 1 tab(s) orally once a day (14 Feb 2024 12:44)  atorvastatin 20 mg oral tablet: 1 tab(s) orally once a day (at bedtime) (11 Feb 2024 19:25)  baclofen 10 mg oral tablet: 1 tab(s) orally 3 times a day (11 Feb 2024 19:26)  Boniva 150 mg oral tablet: 1 tab(s) orally once a month (11 Feb 2024 19:26)  cholecalciferol oral tablet: 1000 unit(s) orally once a day (14 Feb 2024 12:44)  cyanocobalamin 1000 mcg oral tablet: 1 tab(s) orally once a day (14 Feb 2024 12:44)  enoxaparin 40 mg/0.4 mL injectable solution: 40 milligram(s) subcutaneous once a day (14 Feb 2024 12:44)  furosemide 40 mg oral tablet: 1 tab(s) orally once a day (14 Feb 2024 12:44)  Mayzent 2 mg oral tablet: 1 tab(s) orally once a day (11 Feb 2024 19:26)  metoprolol tartrate 25 mg oral tablet: 1 tab(s) orally 2 times a day (14 Feb 2024 12:44)  nystatin 100,000 units/g topical powder: 1 Apply topically to affected area 2 times a day (14 Feb 2024 12:44)  piperacillin-tazobactam 3 g-0.375 g intravenous injection: 3.375 gram(s) intravenous every 8 hours x 1 day (15 Feb 2024 09:54)  Protonix 20 mg oral delayed release tablet: 1 tab(s) orally once a day (11 Feb 2024 19:26)      MEDICATIONS  (STANDING):  amLODIPine   Tablet 5 milliGRAM(s) Oral daily  aspirin enteric coated 81 milliGRAM(s) Oral daily  atorvastatin 20 milliGRAM(s) Oral at bedtime  baclofen 10 milliGRAM(s) Oral every 8 hours  cholecalciferol 1000 Unit(s) Oral daily  cyanocobalamin 1000 MICROGram(s) Oral daily  dextrose 5% + sodium chloride 0.45%. 1000 milliLiter(s) (50 mL/Hr) IV Continuous <Continuous>  enoxaparin Injectable 40 milliGRAM(s) SubCutaneous every 24 hours  furosemide    Tablet 40 milliGRAM(s) Oral daily  Mayzent (siponimod) 2mg 1 Tablet(s) 1 Tablet(s) Oral daily  metoprolol tartrate 25 milliGRAM(s) Oral two times a day  nystatin Powder 1 Application(s) Topical two times a day  pantoprazole    Tablet 40 milliGRAM(s) Oral before breakfast  piperacillin/tazobactam IVPB.. 3.375 Gram(s) IV Intermittent every 8 hours    MEDICATIONS  (PRN):  acetaminophen     Tablet .. 650 milliGRAM(s) Oral every 6 hours PRN Temp greater or equal to 38C (100.4F), Mild Pain (1 - 3)              Vital Signs Last 24 Hrs  T(C): 36.7 (15 Feb 2024 08:16), Max: 37 (15 Feb 2024 05:16)  T(F): 98 (15 Feb 2024 08:16), Max: 98.6 (15 Feb 2024 05:16)  HR: 56 (15 Feb 2024 08:16) (56 - 73)  BP: 145/64 (15 Feb 2024 08:16) (97/63 - 145/64)  BP(mean): --  RR: 18 (15 Feb 2024 08:16) (18 - 18)  SpO2: 96% (15 Feb 2024 08:16) (93% - 96%)    Parameters below as of 15 Feb 2024 08:16  Patient On (Oxygen Delivery Method): room air          02-14-24 @ 07:01  -  02-15-24 @ 07:00  --------------------------------------------------------  IN: 600 mL / OUT: 650 mL / NET: -50 mL    02-15-24 @ 07:01  -  02-15-24 @ 10:28  --------------------------------------------------------  IN: 0 mL / OUT: 1000 mL / NET: -1000 mL              LABS:                        13.6   9.85  )-----------( 295      ( 14 Feb 2024 08:12 )             41.1     02-14    140  |  104  |  10  ----------------------------<  129<H>  3.6   |  23  |  1.03    Ca    9.3      14 Feb 2024 08:12        Urinalysis Basic - ( 14 Feb 2024 08:12 )    Color: x / Appearance: x / SG: x / pH: x  Gluc: 129 mg/dL / Ketone: x  / Bili: x / Urobili: x   Blood: x / Protein: x / Nitrite: x   Leuk Esterase: x / RBC: x / WBC x   Sq Epi: x / Non Sq Epi: x / Bacteria: x            WBC:  WBC Count: 9.85 K/uL (02-14 @ 08:12)  WBC Count: 10.75 K/uL (02-13 @ 08:08)  WBC Count: 11.53 K/uL (02-12 @ 16:00)  WBC Count: 15.46 K/uL (02-11 @ 16:45)      MICROBIOLOGY:  RECENT CULTURES:  02-11 .Blood Blood XXXX XXXX   No growth at 72 Hours                    Sodium:  Sodium: 140 mmol/L (02-14 @ 08:12)  Sodium: 141 mmol/L (02-13 @ 08:08)  Sodium: 141 mmol/L (02-12 @ 16:00)  Sodium: 141 mmol/L (02-11 @ 16:45)      1.03 mg/dL 02-14 @ 08:12  0.81 mg/dL 02-13 @ 08:08  0.90 mg/dL 02-12 @ 16:00  1.22 mg/dL 02-11 @ 16:45      Hemoglobin:  Hemoglobin: 13.6 g/dL (02-14 @ 08:12)  Hemoglobin: 14.2 g/dL (02-13 @ 08:08)  Hemoglobin: 13.7 g/dL (02-12 @ 16:00)  Hemoglobin: 13.8 g/dL (02-11 @ 16:45)      Platelets: Platelet Count - Automated: 295 K/uL (02-14 @ 08:12)  Platelet Count - Automated: 275 K/uL (02-13 @ 08:08)  Platelet Count - Automated: 251 K/uL (02-12 @ 16:00)  Platelet Count - Automated: 269 K/uL (02-11 @ 16:45)          Urinalysis Basic - ( 14 Feb 2024 08:12 )    Color: x / Appearance: x / SG: x / pH: x  Gluc: 129 mg/dL / Ketone: x  / Bili: x / Urobili: x   Blood: x / Protein: x / Nitrite: x   Leuk Esterase: x / RBC: x / WBC x   Sq Epi: x / Non Sq Epi: x / Bacteria: x        RADIOLOGY & ADDITIONAL STUDIES:      MICROBIOLOGY:  RECENT CULTURES:  02-11 .Blood Blood XXXX XXXX   No growth at 72 Hours

## 2024-02-15 NOTE — CAREGIVER ENGAGEMENT NOTE - CAREGIVER NAME
Patient's daughter, Mrs. Rita Bender, @  [phone]
Patient's daughter, Mrs. Rita Bender, @  [phone]
Rita Bender - daughter/HCP

## 2024-02-15 NOTE — PROGRESS NOTE ADULT - ASSESSMENT
The patient is a 73 year old female with a history of HTN, HL, CVA, MS who presents with weakness    Plan:  - ECG with nonspecific findings  - Continue metoprolol tartrate 25 mg bid  - Continue amlodipine 5 mg daily  - Continue atorvastatin 40 mg sasha  - Continue aspirin 81 mg daily  - CT chest with multifocal PNA  - IV antibiotics
   REASON FOR VISIT  .. Management of problems listed below        REVIEW OF SYMPTOMS   Able to give ROS  Yes     RELIABILITY +/-   CONSTITUTIONAL Weakness Yes    ENDOCRINE  No heat or cold intolerance    ALLERGY No hives  Sore throat No stridor  RESP Shortness of breath YES   NEURO New weakness No   CARDIAC   Palpitations No         PHYSICAL EXAM    HEENT Unremarkable  atraumatic   RESP Fair air entry  Harsh breath sound   CARDIAC S1 S2 No S3     NO JVD    ABDOMEN No hepatosplenomegaly   PEDAL EDEMA present No calf tenderness  NO rash     GENERAL DATA .   GOC.  ..    ICU STAY.  ..   COVID. .. scv2 2/11 (-)       BEST PRACTICE ISSUES.    HOB ELEVATN.  .. Yes  DVT PPLX.  ..   2/11 lvnx 40         DIET.   ..  2/12/2024 easy chew   IV fl. .. 2/11 d5 1/2 50     SPEECH SWALLOW RECOMMENDATIONS.  2/12/2024 reg thin mbs        STRESS ULCER PPLX.   .  ..   2/12/2024 protonix 40     ALLGY. ..     nka  WT. ..             2/11/2024 77  BMI. ..             2/11/2024 30  INFECTION PPLX.    ABGS.   .  VS/ PO/IO/ VENT/ DRIPS.   2/12/2024 afeb 80 150/70   2/12/2024 ra 94%     . CC . 2/11/2024 weakness from [facility]   . SUMMARY.     . 2/11/2024 73-year-old female with history of multiple sclerosis, CVA, tremor, brought by ambulance from [facility] living for evaluation of increasing weakness and inability to ambulate today.  Patient states she thinks she had a fever and had some vomiting and diarrhea over the past few days.  Poor historian cannot elaborate on her condition.  Her PCP is Dr. Bliss.  . ER MG  .. 2/11/2024 5p zosyn   .. 2/11/2024 5p vanco   .. 2/11/2024 5p ns 1l   . HOME MEDS   .. atorvastat 40   .. asa 81   .. gilenya 0.5   . OVERALL ASSESSMENT PLAN.   . SEPSIS   . RO PNEUM   .. w 2/11-2/12/2024 w 15- 11.5  .. ua 2/11/2024 le (-)   .. CXR 2/11/2024  .... incr perihilar markings   .. ct ch 2/11   ;;;; l upper and lower lobe consolidatn cw pneum  .. flu ab 2/11/2024 flu ab (-)   .. rsv 2/11/2024 rsv (-)  .. mrsa 2/12 (-)   .. 2/11 zosyn   .. 2/11/2024 Chcek ct ch      . HEMODYNAMICS  .. la 2/11/2024 la 1.8    .. bp ok     . CHF  .. 2/12/2024 lasix 40     . CAD  .. 2/11 asa 81   .. 2/12/2024 atorvastat 20   .. 2/12 metorpolol 25.2     HEMAT  .. Hb 2/11/2024 hb 13   .. inr 2/11/2024 inr 1     RENAL   .. Na 2/11/2024 na 141  .. k 2/11/2024 k 3   .. cr 2/11/2024 cr 1.2     . MS   .. 2/11 fingolimod 0.5     . WEAKNESS RO CVA   .. ct h 2/11/2024 cw mr 6/10/2018  .... old lacunar bl basal ganglia     . COURSE/ACTIVE/SIGNIFICANT ISSUE(S) .  . 73-year-old female with history of multiple sclerosis, CVA, tremor, brought by ambulance from [facility] living for evaluation of increasing weakness and inability to ambulate today admitted 2/11 suspecetd pneum     . SEPSIS  . RO ASPN PNEUM ct ch 2/11 shiva l lo lobe consolidatn 2/11 zosyn   . MULTIPLE SCLEROSIS   . WEAKNESS     TIME SPENT.  . Over 36 minutes aggregate care time spent on encounter; activities included   direct patient care, counseling and/or coordinating care reviewing notes, lab data/ imaging , discussion with multidisciplinary team/ patient  /family and explaining in detail risks, benefits, alternatives  of the recommendations     PATIENT.  . SANDIP FIGUEROA 73 f 2/11/2024 1952 DR JOSE GUADALUPE BRIONES    
The patient is a 73 year old female with a history of HTN, HL, CVA, MS who presents with weakness    Plan:  - ECG with nonspecific findings  - Continue metoprolol tartrate 25 mg bid  - Continue amlodipine 5 mg daily  - Continue atorvastatin 40 mg sasha  - Continue aspirin 81 mg daily  - CT chest with multifocal PNA  - IV antibiotics
The patient is a 73 year old female with a history of HTN, HL, CVA, MS who presents with weakness    Plan:  - ECG with nonspecific findings  - Continue metoprolol tartrate 25 mg bid  - Continue amlodipine 5 mg daily  - Continue atorvastatin 40 mg sasha  - Continue aspirin 81 mg daily  - CT chest with multifocal PNA  - IV antibiotics
   REASON FOR VISIT  .. Management of problems listed below        REVIEW OF SYMPTOMS   Able to give ROS  Yes     RELIABILITY +/-   CONSTITUTIONAL Weakness Yes    ENDOCRINE  No heat or cold intolerance    ALLERGY No hives  Sore throat No stridor  RESP Shortness of breath YES   NEURO New weakness No   CARDIAC   Palpitations No         PHYSICAL EXAM    HEENT Unremarkable  atraumatic   RESP Fair air entry  Harsh breath sound   CARDIAC S1 S2 No S3     NO JVD    ABDOMEN No hepatosplenomegaly   PEDAL EDEMA present No calf tenderness  NO rash     GENERAL DATA .   GOC.  ..    ICU STAY.  ..   COVID. .. scv2 2/11 (-)       BEST PRACTICE ISSUES.    HOB ELEVATN.  .. Yes  DVT PPLX.  ..   2/11 lvnx 40         DIET.   ..  2/12/2024 easy chew   IV fl. .. 2/11 d5 1/2 50     SPEECH SWALLOW RECOMMENDATIONS.  2/12/2024 reg thin mbs        ABGS.   .  VS/ PO/IO/ VENT/ DRIPS.   ; afeb 70 120/60   2/14/2024 ra 96%     . CC . 2/11/2024 weakness from [facility]   . SUMMARY.     . 2/11/2024 73-year-old female with history of multiple sclerosis, CVA, tremor, brought by ambulance from [facility] living for evaluation of increasing weakness and inability to ambulate today.  Patient states she thinks she had a fever and had some vomiting and diarrhea over the past few days.  Poor historian cannot elaborate on her condition.  Her PCP is Dr. Bliss.  . ER MG  .. 2/11/2024 5p zosyn   .. 2/11/2024 5p vanco   .. 2/11/2024 5p ns 1l   . HOME MEDS   .. atorvastat 40   .. asa 81   .. gilenya 0.5   . OVERALL ASSESSMENT PLAN.   . SEPSIS   . RO PNEUM   .. w 2/11-2/12-2/13-2/14/2024 w 15- 11.5- 10.7 - 9  .. ua 2/11/2024 le (-)   .. CXR 2/11/2024  .... incr perihilar markings   .. ct ch 2/11   .... l upper and lower lobe consolidatn cw pneum  .. flu ab 2/11/2024 flu ab (-)   .. rsv 2/11/2024 rsv (-)  .. legn 2/14 (-)   .. uc 2/11 (-)   .. bc 2/11 (-)   .. strep pneu ag 2/14/2024 (-)   .. mrsa 2/12 (-)   .. 2/11 zosyn   .. 2/11/2024 Chcek ct ch      . HEMODYNAMICS  .. la 2/11/2024 la 1.8    .. bp ok     . CHF  .. bnp 2/13/2024 901   .. 2/12/2024 lasix 40     . CAD  .. 2/11 asa 81   .. 2/12/2024 atorvastat 20   .. 2/12 metorpolol 25.2     HEMAT  .. Hb 2/11-2/13-2/14/2024 hb 13 - 14.2 - 13.6   .. inr 2/11/2024 inr 1     RENAL   .. Na 2/11/2024 na 141  .. k 2/11/2024 k 3   .. cr 2/11/2024 cr 1.2     . MS   .. 2/11 fingolimod 0.5     . WEAKNESS RO CVA   .. ct h 2/11/2024 cw mr 6/10/2018  .... old lacunar bl basal ganglia     . COURSE/ACTIVE/SIGNIFICANT ISSUE(S) .  . 73-year-old female with history of multiple sclerosis, CVA, tremor, brought by ambulance from [facility] living for evaluation of increasing weakness and inability to ambulate today admitted 2/11 suspecetd pneum     . SEPSIS  . RO ASPN PNEUM ct ch 2/11 shiva l lo lobe consolidatn 2/11 zosyn   . MULTIPLE SCLEROSIS   . WEAKNESS     TIME SPENT.  . Over 36 minutes aggregate care time spent on encounter; activities included   direct patient care, counseling and/or coordinating care reviewing notes, lab data/ imaging , discussion with multidisciplinary team/ patient  /family and explaining in detail risks, benefits, alternatives  of the recommendations     PATIENT.  . SANDIP FIGUEROA 73 f 2/11/2024 1952 DR JOSE GUADALUPE BRIONES  
   REASON FOR VISIT  .. Management of problems listed below        REVIEW OF SYMPTOMS   Able to give ROS  Yes     RELIABILITY +/-   CONSTITUTIONAL Weakness Yes    ENDOCRINE  No heat or cold intolerance    ALLERGY No hives  Sore throat No stridor  RESP Shortness of breath YES   NEURO New weakness No   CARDIAC   Palpitations No         PHYSICAL EXAM    HEENT Unremarkable  atraumatic   RESP Fair air entry  Harsh breath sound   CARDIAC S1 S2 No S3     NO JVD    ABDOMEN No hepatosplenomegaly   PEDAL EDEMA present No calf tenderness  NO rash     GENERAL DATA .   GOC.  ..    ICU STAY.  ..   COVID. .. scv2 2/11 (-)       BEST PRACTICE ISSUES.    HOB ELEVATN.  .. Yes  DVT PPLX.  ..   2/11 lvnx 40         DIET.   ..  2/12/2024 easy chew   IV fl. .. 2/11 d5 1/2 50     SPEECH SWALLOW RECOMMENDATIONS.  2/12/2024 reg thin mbs        STRESS ULCER PPLX.   .  ..   2/12/2024 protonix 40     ALLGY. ..     nka  WT. ..             2/11/2024 77  BMI. ..             2/11/2024 30    ABGS.   .  VS/ PO/IO/ VENT/ DRIPS.   2/15/2024 afeb 60 140/70   2/15/2024 ra 93%     . CC . 2/11/2024 weakness from [facility]   . SUMMARY.     . 2/11/2024 73-year-old female with history of multiple sclerosis, CVA, tremor, brought by ambulance from [facility] living for evaluation of increasing weakness and inability to ambulate today.  Patient states she thinks she had a fever and had some vomiting and diarrhea over the past few days.  Poor historian cannot elaborate on her condition.  Her PCP is Dr. Bliss.  . ER MG  .. 2/11/2024 5p zosyn   .. 2/11/2024 5p vanco   .. 2/11/2024 5p ns 1l   . HOME MEDS   .. atorvastat 40   .. asa 81   .. gilenya 0.5   . OVERALL ASSESSMENT PLAN.   . SEPSIS   . RO PNEUM   .. w 2/11-2/12-2/13-2/14/2024 w 15- 11.5- 10.7 - 9  .. ua 2/11/2024 le (-)   .. CXR 2/11/2024  .... incr perihilar markings   .. ct ch 2/11   .... l upper and lower lobe consolidatn cw pneum  .. flu ab 2/11/2024 flu ab (-)   .. rsv 2/11/2024 rsv (-)  .. legn 2/14 (-)   .. uc 2/11 (-)   .. bc 2/11 (-)   .. strep pneu ag 2/14/2024 (-)   .. mrsa 2/12 (-)   .. 2/11 zosyn   .. 2/11/2024 Chcek ct ch      . HEMODYNAMICS  .. la 2/11/2024 la 1.8    .. bp ok     . CHF  .. bnp 2/13/2024 901   .. 2/12/2024 lasix 40     . CAD  .. 2/11 asa 81   .. 2/12/2024 atorvastat 20   .. 2/12 metorpolol 25.2     HEMAT  .. Hb 2/11-2/13-2/14/2024 hb 13 - 14.2 - 13.6   .. inr 2/11/2024 inr 1     RENAL   .. Na 2/11/2024 na 141  .. k 2/11/2024 k 3   .. cr 2/11/2024 cr 1.2     . MS   .. 2/11 fingolimod 0.5     . WEAKNESS RO CVA   .. ct h 2/11/2024 cw mr 6/10/2018  .... old lacunar bl basal ganglia     . COURSE/ACTIVE/SIGNIFICANT ISSUE(S) .  . 73-year-old female with history of multiple sclerosis, CVA, tremor, brought by ambulance from [facility] living for evaluation of increasing weakness and inability to ambulate today admitted 2/11 suspecetd pneum     . SEPSIS  . RO ASPN PNEUM ct ch 2/11 shiva l lo lobe consolidatn 2/11 zosyn   . MULTIPLE SCLEROSIS   . WEAKNESS     TIME SPENT.  . Over 36 minutes aggregate care time spent on encounter; activities included   direct patient care, counseling and/or coordinating care reviewing notes, lab data/ imaging , discussion with multidisciplinary team/ patient  /family and explaining in detail risks, benefits, alternatives  of the recommendations     PATIENT.  . SANDIP FIGUEROA 73 f 2/11/2024 1952 DR JOSE GUADALUPE BRIONES  
   REASON FOR VISIT  .. Management of problems listed below        REVIEW OF SYMPTOMS   Able to give ROS  Yes     RELIABILITY +/-   CONSTITUTIONAL Weakness Yes    ENDOCRINE  No heat or cold intolerance    ALLERGY No hives  Sore throat No stridor  RESP Shortness of breath YES   NEURO New weakness No   CARDIAC   Palpitations No         PHYSICAL EXAM    HEENT Unremarkable  atraumatic   RESP Fair air entry  Harsh breath sound   CARDIAC S1 S2 No S3     NO JVD    ABDOMEN No hepatosplenomegaly   PEDAL EDEMA present No calf tenderness  NO rash     GENERAL DATA .   GOC.  ..    ICU STAY.  ..   COVID. .. scv2 2/11 (-)       BEST PRACTICE ISSUES.    HOB ELEVATN.  .. Yes  DVT PPLX.  ..   2/11 lvnx 40         DIET.   ..  2/12/2024 easy chew   IV fl. .. 2/11 d5 1/2 50     SPEECH SWALLOW RECOMMENDATIONS.  2/12/2024 reg thin mbs        STRESS ULCER PPLX.   .  ..   2/12/2024 protonix 40     ALLGY. ..     nka  WT. ..             2/11/2024 77  BMI. ..             2/11/2024 30  INFECTION PPLX.   ..         ABGS.   .  VS/ PO/IO/ VENT/ DRIPS.   2/13/2024 afeb 64 150/70     . CC . 2/11/2024 weakness from [facility]   . SUMMARY.     . 2/11/2024 73-year-old female with history of multiple sclerosis, CVA, tremor, brought by ambulance from [facility] living for evaluation of increasing weakness and inability to ambulate today.  Patient states she thinks she had a fever and had some vomiting and diarrhea over the past few days.  Poor historian cannot elaborate on her condition.  Her PCP is Dr. Bliss.  . ER MG  .. 2/11/2024 5p zosyn   .. 2/11/2024 5p vanco   .. 2/11/2024 5p ns 1l   . HOME MEDS   .. atorvastat 40   .. asa 81   .. gilenya 0.5   . OVERALL ASSESSMENT PLAN.   . SEPSIS   . RO PNEUM   .. w 2/11-2/12-2/13/2024 w 15- 11.5- 10.7   .. ua 2/11/2024 le (-)   .. CXR 2/11/2024  .... incr perihilar markings   .. ct ch 2/11   .... l upper and lower lobe consolidatn cw pneum  .. flu ab 2/11/2024 flu ab (-)   .. rsv 2/11/2024 rsv (-)  .. mrsa 2/12 (-)   .. 2/11 zosyn   .. 2/11/2024 Chcek ct ch      . HEMODYNAMICS  .. la 2/11/2024 la 1.8    .. bp ok     . CHF  .. bnp 2/13/2024 901   .. 2/12/2024 lasix 40     . CAD  .. 2/11 asa 81   .. 2/12/2024 atorvastat 20   .. 2/12 metorpolol 25.2     HEMAT  .. Hb 2/11-2/13/2024 hb 13 - 14.2   .. inr 2/11/2024 inr 1     RENAL   .. Na 2/11/2024 na 141  .. k 2/11/2024 k 3   .. cr 2/11/2024 cr 1.2     . MS   .. 2/11 fingolimod 0.5     . WEAKNESS RO CVA   .. ct h 2/11/2024 cw mr 6/10/2018  .... old lacunar bl basal ganglia     . COURSE/ACTIVE/SIGNIFICANT ISSUE(S) .  . 73-year-old female with history of multiple sclerosis, CVA, tremor, brought by ambulance from [facility] living for evaluation of increasing weakness and inability to ambulate today admitted 2/11 suspecetd pneum     . SEPSIS  . RO ASPN PNEUM ct ch 2/11 shiva l lo lobe consolidatn 2/11 zosyn   . MULTIPLE SCLEROSIS   . WEAKNESS     TIME SPENT.  . Over 36 minutes aggregate care time spent on encounter; activities included   direct patient care, counseling and/or coordinating care reviewing notes, lab data/ imaging , discussion with multidisciplinary team/ patient  /family and explaining in detail risks, benefits, alternatives  of the recommendations     PATIENT.  . SANDIP FIGUEROA 73 f 2/11/2024 1952 DR JOSE GUADALUPE BRIONES  
· Assessment	  The patient is a 73 year old female with a history of HTN, HL, CVA, MS who presents with weakness  - ECG with nonspecific findings  - Continue metoprolol tartrate 25 mg bid  - Continue amlodipine 5 mg daily  - Continue atorvastatin 40 mg sasha  - Continue aspirin 81 mg daily  - CT chest with multifocal PNA  - IV antibiotics  - Continue IV fluids as neededAfebrile at present.  Impression is MS patient with increasing weakness and an inability to ambulate.  Rule out infection rule out dehydration rule out MS exacerbation.  Plan is labs urine chest x-ray CT brain IV fluids and likely admission for further evaluation and treatment.
· Assessment	  The patient is a 73 year old female with a history of HTN, HL, CVA, MS who presents with weakness  - ECG with nonspecific findings  - Continue metoprolol tartrate 25 mg bid  - Continue amlodipine 5 mg daily  - Continue atorvastatin 40 mg sasha  - Continue aspirin 81 mg daily  - CT chest with multifocal PNA  - IV antibiotics  - Continue IV fluids as neededAfebrile at present.  Impression is MS patient with increasing weakness and an inability to ambulate.  Rule out infection rule out dehydration rule out MS exacerbation.  Plan is labs urine chest x-ray CT brain IV fluids and likely admission for further evaluation and treatment.

## 2024-02-17 LAB
CULTURE RESULTS: SIGNIFICANT CHANGE UP
CULTURE RESULTS: SIGNIFICANT CHANGE UP
SPECIMEN SOURCE: SIGNIFICANT CHANGE UP
SPECIMEN SOURCE: SIGNIFICANT CHANGE UP

## 2024-11-11 NOTE — PHYSICAL THERAPY INITIAL EVALUATION ADULT - SITTING BALANCE: STATIC
Have Your Skin Lesions Been Treated?: not been treated
Is This A New Presentation, Or A Follow-Up?: Skin Lesions
How Severe Is Your Skin Lesion?: moderate
good balance
